# Patient Record
Sex: FEMALE | Race: WHITE | Employment: OTHER | URBAN - METROPOLITAN AREA
[De-identification: names, ages, dates, MRNs, and addresses within clinical notes are randomized per-mention and may not be internally consistent; named-entity substitution may affect disease eponyms.]

---

## 2017-01-02 ENCOUNTER — TELEPHONE (OUTPATIENT)
Dept: OTHER | Facility: CLINIC | Age: 42
End: 2017-01-02

## 2017-01-02 NOTE — TELEPHONE ENCOUNTER
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  Returned pt call [phone]  Pt states she has a toothache, throbbing, x 1 week. Saw dentist, reportedly no cavity or abscess. No fever. Not associated with exertion, worse at end of day or when lying down. She has a  Hx of cap on that tooth. Seen in the past by Dr Zuniga. Wanted to check with us to see if this was her heart because her father had toothache as anginal equivalent. I reassured her that her symptoms sounded non-anginal. She was grateful for my taking her call, and will follow up with dental/ medicine as appropriate.        Delvis Velez  Cardiology Fellow  [phone]

## 2017-01-16 ENCOUNTER — OFFICE VISIT (OUTPATIENT)
Dept: FAMILY MEDICINE | Facility: CLINIC | Age: 42
End: 2017-01-16
Payer: COMMERCIAL

## 2017-01-16 VITALS
HEART RATE: 89 BPM | HEIGHT: 70 IN | RESPIRATION RATE: 16 BRPM | OXYGEN SATURATION: 99 % | BODY MASS INDEX: 24.69 KG/M2 | WEIGHT: 172.5 LBS | DIASTOLIC BLOOD PRESSURE: 72 MMHG | SYSTOLIC BLOOD PRESSURE: 94 MMHG | TEMPERATURE: 97.7 F

## 2017-01-16 DIAGNOSIS — D68.51 HOMOZYGOUS FACTOR V LEIDEN MUTATION (H): ICD-10-CM

## 2017-01-16 DIAGNOSIS — R45.89 ANXIETY ABOUT HEALTH: ICD-10-CM

## 2017-01-16 DIAGNOSIS — Z79.01 LONG-TERM (CURRENT) USE OF ANTICOAGULANTS: ICD-10-CM

## 2017-01-16 DIAGNOSIS — R51.9 NONINTRACTABLE EPISODIC HEADACHE, UNSPECIFIED HEADACHE TYPE: Primary | ICD-10-CM

## 2017-01-16 DIAGNOSIS — I26.99 OTHER PULMONARY EMBOLISM WITHOUT ACUTE COR PULMONALE, UNSPECIFIED CHRONICITY (H): ICD-10-CM

## 2017-01-16 PROCEDURE — 99214 OFFICE O/P EST MOD 30 MIN: CPT | Performed by: NURSE PRACTITIONER

## 2017-01-16 NOTE — PROGRESS NOTES
SUBJECTIVE:                                                    Zayra Altamirano is a 41 year old female who presents to clinic today for the following health issues:    Headache      Duration: x 2 weeks    Description (location/character/radiation): above right ear     Intensity:  Mild to moderate     Accompanying signs and symptoms: muscle soreness on right side of shoulder    History (similar episodes/previous evaluation): None    Therapies tried and outcome: tylenol - help a little .      Had a crown displaced in  Right lower jaw, pushed into a a nerve of the tooth.  Was adjusted by dentist and headache has since resolved.  Finding her anxiety and worry related to health issues has magnified in the last 6 months.  Thinking she was having a stroke.  Worrying a lot.           Problem list and histories reviewed & adjusted, as indicated.  Additional history: as documented    Patient Active Problem List   Diagnosis     Embolism and thrombosis (H)     Pulmonary embolism and infarction (H)     MARNI DEF CLOT FACTOR NEC, Factor V Leiden homozygous- on lovenox 80mg bid     History of gestational diabetes     CARDIOVASCULAR SCREENING; LDL GOAL LESS THAN 160     Long-term (current) use of anticoagulants [Z79.01]     Ankle pain, left     Ankle pain, right     Homozygous Factor V Leiden mutation (H)     Other pulmonary embolism without acute cor pulmonale, unspecified chronicity (H)     Anxiety about health     Past Surgical History   Procedure Laterality Date     Hc colposcopy vulva w biopsy       C event monitor (cardiac - fl)  10/08     Normal/had palpatiations.      C/section, low transverse  2008     , Low Transverse      section  2012     Procedure: SECTION; Surgeon:ELY KRAMER; Location:[facility]       Social History   Substance Use Topics     Smoking status: Never Smoker      Smokeless tobacco: Never Used     Alcohol Use: 0.6 oz/week     1 Standard  "drinks or equivalent per week     Family History   Problem Relation Age of Onset     Blood Disease Brother      FACTOR 5 LEIDEN HOMOZYGOUS     Lipids Father      Hypertension Father      Circulatory Father      veins stripped      Unknown/Adopted Father      hyperlipidemia      Blood Disease Sister      FACTOR 5 LEIDEN      Blood Disease Sister      FACTOR 5     Breast Cancer Maternal Grandmother      DIABETES Maternal Grandmother      Type2     CEREBROVASCULAR DISEASE Maternal Grandmother      Cardiovascular Maternal Grandmother      MI     CANCER Maternal Grandfather      liver cancer      Breast Cancer Mother 63     diagnosed in May invasive ductal carcinoma         Current Outpatient Prescriptions   Medication Sig Dispense Refill     Acetaminophen (TYLENOL PO) Take 500 mg by mouth       warfarin (COUMADIN) 7.5 MG tablet TAKE ONE TABLET DAILY OR AS DIRECTED BY INR CLINIC NURSE. ( Takes 7.5 mg TTSS.  )  Takes 10 mg MWF (adds 2.5 mg tablet to the 7.5 mg tablet) 90 tablet 3     warfarin (COUMADIN) 2.5 MG tablet TAKE ONE TABLET ON MWF added to the 7.5 mg tablet to equal 10 mg on these days. Or AS DIRECTED BY COUMADIN CLINIC NURSE. 40 tablet 3     Allergies   Allergen Reactions     Benadryl Allergy Other (See Comments)     Had paradoxical reaction - made her very hyper       ROS:  Constitutional, HEENT, cardiovascular, pulmonary, gi and gu systems are negative, except as otherwise noted.    OBJECTIVE:                                                    BP 94/72 mmHg  Pulse 89  Temp(Src) 97.7  F (36.5  C) (Oral)  Resp 16  Ht 5' 9.75\" (1.772 m)  Wt 172 lb 8 oz (78.245 kg)  BMI 24.92 kg/m2  SpO2 99%  LMP 01/10/2017 (Exact Date)  Breastfeeding? No  Body mass index is 24.92 kg/(m^2).  GENERAL: healthy, alert and no distress  EYES: Eyes grossly normal to inspection, PERRL and conjunctivae and sclerae normal  HENT: ear canals and TM's normal, nose and mouth without ulcers or lesions  NECK: no adenopathy, no " asymmetry, masses, or scars and thyroid normal to palpation  RESP: lungs clear to auscultation - no rales, rhonchi or wheezes  CV: regular rate and rhythm, normal S1 S2, no S3 or S4, no murmur, click or rub, no peripheral edema and peripheral pulses strong  MS: no gross musculoskeletal defects noted, no edema  SKIN: no suspicious lesions or rashes    Diagnostic Test Results:  none      ASSESSMENT/PLAN:                                                        ICD-10-CM    1. Nonintractable episodic headache, unspecified headache type R51    2. Homozygous Factor V Leiden mutation (H) D68.52    3. Other pulmonary embolism without acute cor pulmonale, unspecified chronicity (H) I26.99    4. Long-term (current) use of anticoagulants [Z79.01] Z79.01    5. Anxiety about health F41.8 MENTAL HEALTH REFERRAL   headache resolved.      Ongoing worry about minor health concerns.      See Patient Instructions    SWAPNA Santana CNP  [facility]

## 2017-01-16 NOTE — NURSING NOTE
"Chief Complaint   Patient presents with     Headache       Initial BP 94/72 mmHg  Pulse 89  Temp(Src) 97.7  F (36.5  C) (Oral)  Resp 16  Ht 5' 9.75\" (1.772 m)  Wt 172 lb 8 oz (78.245 kg)  BMI 24.92 kg/m2  SpO2 99%  LMP 01/10/2017 (Exact Date)  Breastfeeding? No Estimated body mass index is 24.92 kg/(m^2) as calculated from the following:    Height as of this encounter: 5' 9.75\" (1.772 m).    Weight as of this encounter: 172 lb 8 oz (78.245 kg).  BP completed using cuff size: regular  Mitzy Dooley MA      "

## 2017-04-16 DIAGNOSIS — I74.9 EMBOLISM AND THROMBOSIS (H): ICD-10-CM

## 2017-04-16 DIAGNOSIS — I26.99 PULMONARY EMBOLISM AND INFARCTION (H): ICD-10-CM

## 2017-04-17 RX ORDER — WARFARIN SODIUM 2.5 MG/1
TABLET ORAL
Qty: 40 TABLET | Refills: 3 | Status: SHIPPED | OUTPATIENT
Start: 2017-04-17 | End: 2018-04-02

## 2017-04-19 ENCOUNTER — OFFICE VISIT (OUTPATIENT)
Dept: FAMILY MEDICINE | Facility: CLINIC | Age: 42
End: 2017-04-19
Payer: COMMERCIAL

## 2017-04-19 VITALS
RESPIRATION RATE: 14 BRPM | WEIGHT: 175 LBS | HEART RATE: 83 BPM | DIASTOLIC BLOOD PRESSURE: 69 MMHG | TEMPERATURE: 97.7 F | BODY MASS INDEX: 25.05 KG/M2 | HEIGHT: 70 IN | OXYGEN SATURATION: 98 % | SYSTOLIC BLOOD PRESSURE: 129 MMHG

## 2017-04-19 DIAGNOSIS — Z00.00 ROUTINE GENERAL MEDICAL EXAMINATION AT A HEALTH CARE FACILITY: Primary | ICD-10-CM

## 2017-04-19 DIAGNOSIS — Z01.419 ENCOUNTER FOR GYNECOLOGICAL EXAMINATION WITHOUT ABNORMAL FINDING: ICD-10-CM

## 2017-04-19 DIAGNOSIS — D68.51 HOMOZYGOUS FACTOR V LEIDEN MUTATION (H): ICD-10-CM

## 2017-04-19 PROCEDURE — G0145 SCR C/V CYTO,THINLAYER,RESCR: HCPCS | Performed by: NURSE PRACTITIONER

## 2017-04-19 PROCEDURE — 87624 HPV HI-RISK TYP POOLED RSLT: CPT | Performed by: NURSE PRACTITIONER

## 2017-04-19 PROCEDURE — 99396 PREV VISIT EST AGE 40-64: CPT | Performed by: NURSE PRACTITIONER

## 2017-04-19 NOTE — MR AVS SNAPSHOT
After Visit Summary   4/19/2017    Zayra Altamirano    MRN: [id number]           Patient Information     Date Of Birth          1975        Visit Information        Provider Department      4/19/2017 10:20 AM Jaquelin Duarte APRN [facility]        Today's Diagnoses     Routine general medical examination at a health care facility    -  1    Encounter for gynecological examination without abnormal finding        Homozygous Factor V Leiden mutation (H)          Care Instructions      Preventive Health Recommendations  Female Ages 40 to 49    Yearly exam:     See your health care provider every year in order to  1. Review health changes.   2. Discuss preventive care.    3. Review your medicines if your doctor prescribed any.      Get a Pap test every three years (unless you have an abnormal result and your provider advises testing more often).      If you get Pap tests with HPV test, you only need to test every 5 years, unless you have an abnormal result. You do not need a Pap test if your uterus was removed (hysterectomy) and you have not had cancer.      You should be tested each year for STDs (sexually transmitted diseases), if you're at risk.       Ask your doctor if you should have a mammogram.      Have a colonoscopy (test for colon cancer) if someone in your family has had colon cancer or polyps before age 50.       Have a cholesterol test every 5 years.       Have a diabetes test (fasting glucose) after age 45. If you are at risk for diabetes, you should have this test every 3 years.    Shots: Get a flu shot each year. Get a tetanus shot every 10 years.     Nutrition:     Eat at least 5 servings of fruits and vegetables each day.    Eat whole-grain bread, whole-wheat pasta and brown rice instead of white grains and rice.    Talk to your provider about Calcium and Vitamin D.     Lifestyle    Exercise at least 150 minutes a week (an average of 30 minutes  a day, 5 days a week). This will help you control your weight and prevent disease.    Limit alcohol to one drink per day.    No smoking.     Wear sunscreen to prevent skin cancer.    See your dentist every six months for an exam and cleaning.        Follow-ups after your visit        Additional Services     INR CLINIC REFERRAL       Your provider has referred you to INR Services.    Please be aware that coverage of these services is subject to the terms and limitations of your health insurance plan.  Call member services at your health plan with any benefit or coverage questions.    Indication for Anticoagulation: DVT (first time)  If nonstandard INR is desired, indicate goal range and explanation: 2-3  Expected Duration of Therapy: Lifetime                  Who to contact     If you have questions or need follow up information about today's clinic visit or your schedule please contact [facility] directly at [phone].  Normal or non-critical lab and imaging results will be communicated to you by [employer], letter or phone within 4 business days after the clinic has received the results. If you do not hear from us within 7 days, please contact the clinic through [employer] or phone. If you have a critical or abnormal lab result, we will notify you by phone as soon as possible.  Submit refill requests through [employer] or call your pharmacy and they will forward the refill request to us. Please allow 3 business days for your refill to be completed.          Additional Information About Your Visit        [employer] Information     [employer] gives you secure access to your electronic health record. If you see a primary care provider, you can also send messages to your care team and make appointments. If you have questions, please call your primary care clinic.  If you do not have a primary care provider, please call [phone] and they will assist you.        Care EveryWhere ID     This is your Care  "EveryWhere ID. This could be used by other organizations to access your [facility] medical records  [id number]        Your Vitals Were     Pulse Temperature Respirations Height Last Period Pulse Oximetry    83 97.7  F (36.5  C) (Oral) 14 5' 9.5\" (1.765 m) 04/03/2017 98%    BMI (Body Mass Index)                   25.47 kg/m2            Blood Pressure from Last 3 Encounters:   04/19/17 129/69   01/16/17 94/72   10/10/16 110/70    Weight from Last 3 Encounters:   04/19/17 175 lb (79.4 kg)   01/16/17 172 lb 8 oz (78.2 kg)   10/10/16 167 lb 12.8 oz (76.1 kg)              We Performed the Following     HPV High Risk Types DNA Cervical     INR CLINIC REFERRAL     Pap imaged thin layer screen with HPV - recommended age 30 - 65        Primary Care Provider Office Phone # Fax #    Jaquelin GonsalezSWAPNA Zuluaga [facility] [phone]       [facility] [address] [place] [id number]        Thank you!     Thank you for choosing [facility]  for your care. Our goal is always to provide you with excellent care. Hearing back from our patients is one way we can continue to improve our services. Please take a few minutes to complete the written survey that you may receive in the mail after your visit with us. Thank you!             Your Updated Medication List - Protect others around you: Learn how to safely use, store and throw away your medicines at www.disposemymeds.org.          This list is accurate as of: 4/19/17  2:52 PM.  Always use your most recent med list.                   Brand Name Dispense Instructions for use    * JANTOVEN 7.5 MG tablet   Generic drug:  warfarin     90 tablet    FOR DIRECTIONS ON HOW TO   TAKE THIS MEDICINE, READ   THE ENCLOSED MEDICATION    INFORMATION FORM       * JANTOVEN 2.5 MG tablet   Generic drug:  warfarin     40 tablet    FOR INSTRUCTIONS ON THE    PROPER DOSING OF YOUR      MEDICATION REFER TO YOUR   PARTICIPANT COUNSELING FORM       TYLENOL PO     "  Take 500 mg by mouth       * Notice:  This list has 2 medication(s) that are the same as other medications prescribed for you. Read the directions carefully, and ask your doctor or other care provider to review them with you.

## 2017-04-19 NOTE — PROGRESS NOTES
SUBJECTIVE:     CC: Zayra Altamirano is an 41 year old woman who presents for preventive health visit.     Physical   Annual:     Getting at least 3 servings of Calcium per day::  Yes    Bi-annual eye exam::  Yes    Dental care twice a year::  Yes    Sleep apnea or symptoms of sleep apnea::  None    Diet::  Regular (no restrictions)    Frequency of exercise::  2-3 days/week    Duration of exercise::  45-60 minutes    Taking medications regularly::  Yes    Medication side effects::  None    Additional concerns today::  No      Today's PHQ-2 Score:   PHQ-2 ( 1999 Pfizer) 4/19/2017   Little interest or pleasure in doing things Not at all   Feeling down, depressed or hopeless Not at all   PHQ-2 Score 0       Abuse: Current or Past(Physical, Sexual or Emotional)- No  Do you feel safe in your environment - Yes    Social History   Substance Use Topics     Smoking status: Never Smoker     Smokeless tobacco: Never Used     Alcohol use 0.6 oz/week     1 Standard drinks or equivalent per week     The patient does not drink >3 drinks per day nor >7 drinks per week.    Recent Labs   Lab Test  10/10/16   1108  05/02/16   1106   05/20/13   0912  12/21/12   1032   CHOL  197  198   < >  188  227*   HDL  51  50   < >  52  56   LDL  126*  123*   < >  113  157*   TRIG  98  123   < >  114  72   CHOLHDLRATIO   --    --    --   3.6  4.1   NHDL  146*  148*   --    --    --     < > = values in this interval not displayed.       Reviewed orders with patient.  Reviewed health maintenance and updated orders accordingly - Yes    Mammo Decision Support:    Pertinent mammograms are reviewed under the imaging tab.  History of abnormal Pap smear: NO - age 30-65 PAP every 5 years with negative HPV co-testing recommended    Reviewed and updated as needed this visit by clinical staff  Tobacco  Allergies  Meds  Med Hx  Surg Hx  Fam Hx  Soc Hx        Reviewed and updated as needed this visit by Provider  Tobacco  Allergies  Meds   "Med Hx  Surg Hx  Fam Hx  Soc Hx         Sexually active, male  partners,  Would not like std screening.  Is using male surg for contraception.      Works at [employer].     Does exercise regularly.  Does  eat a well balanced diet including plenty of fruits, vegetables, calcium and proteins.        On coumadin for life due to factor V leiden and DVT, PE.  Due for INR, following with [facility] nurse sporadically.      Has nabothian cyst on cervix and cyst on anterior vag wall, not changing.  Nontender.  Prefers vag exam yearly, even if pap not due.    ROS:  C: NEGATIVE for fever, chills, change in weight  I: NEGATIVE for worrisome rashes, moles or lesions  E: NEGATIVE for vision changes or irritation  ENT: NEGATIVE for ear, mouth and throat problems  R: NEGATIVE for significant cough or SOB  B: NEGATIVE for masses, tenderness or discharge  CV: NEGATIVE for chest pain, palpitations or peripheral edema  GI: NEGATIVE for nausea, abdominal pain, heartburn, or change in bowel habits  : NEGATIVE for unusual urinary or vaginal symptoms. Periods are regular.  M: NEGATIVE for significant arthralgias or myalgia  N: NEGATIVE for weakness, dizziness or paresthesias  P: NEGATIVE for changes in mood or affect    Problem list, Medication list, Allergies, and Medical/Social/Surgical histories reviewed in [facility] and updated as appropriate.  Labs reviewed in EPIC  BP Readings from Last 3 Encounters:   04/19/17 129/69   01/16/17 94/72   10/10/16 110/70    Wt Readings from Last 3 Encounters:   04/19/17 175 lb (79.4 kg)   01/16/17 172 lb 8 oz (78.2 kg)   10/10/16 167 lb 12.8 oz (76.1 kg)                  OBJECTIVE:     /69 (BP Location: Left arm)  Pulse 83  Temp 97.7  F (36.5  C) (Oral)  Resp 14  Ht 5' 9.5\" (1.765 m)  Wt 175 lb (79.4 kg)  LMP 04/03/2017  SpO2 98%  BMI 25.47 kg/m2  EXAM:  GENERAL: healthy, alert and no distress  EYES: Eyes grossly normal to inspection, PERRL and conjunctivae and sclerae normal  HENT: ear canals and " "TM's normal, nose and mouth without ulcers or lesions  NECK: no adenopathy, no asymmetry, masses, or scars and thyroid normal to palpation  RESP: lungs clear to auscultation - no rales, rhonchi or wheezes  BREAST: normal without masses, tenderness or nipple discharge and no palpable axillary masses or adenopathy  CV: regular rate and rhythm, normal S1 S2, no S3 or S4, no murmur, click or rub, no peripheral edema and peripheral pulses strong  ABDOMEN: soft, nontender, no hepatosplenomegaly, no masses and bowel sounds normal   (female): cyst x 2 on cervix, 12 oclock and 6 oclock. 1 cm  Cyst on anterior wall of vagina just inside introitis.  normal female external genitalia, normal urethral meatus, vaginal mucosa pink, moist, well rugated, and normal cervix/adnexa/uterus without discharge  MS: no gross musculoskeletal defects noted, no edema  SKIN: no suspicious lesions or rashes  NEURO: Normal strength and tone, mentation intact and speech normal  PSYCH: mentation appears normal, affect normal/bright    ASSESSMENT/PLAN:         ICD-10-CM    1. Routine general medical examination at a health care facility Z00.00 Pap imaged thin layer screen with HPV - recommended age 30 - 65     HPV High Risk Types DNA Cervical   2. Encounter for gynecological examination without abnormal finding Z01.419 Pap imaged thin layer screen with HPV - recommended age 30 - 65     HPV High Risk Types DNA Cervical   3. Homozygous Factor V Leiden mutation (H) D68.52 INR CLINIC REFERRAL      well female, not fasting for labs.  Encouraged to continue exercise and healthy diet choices.          COUNSELING:  Reviewed preventive health counseling, as reflected in patient instructions         reports that she has never smoked. She has never used smokeless tobacco.    Estimated body mass index is 25.47 kg/(m^2) as calculated from the following:    Height as of this encounter: 5' 9.5\" (1.765 m).    Weight as of this encounter: 175 lb (79.4 kg). "       Counseling Resources:  ATP IV Guidelines  Pooled Cohorts Equation Calculator  Breast Cancer Risk Calculator  FRAX Risk Assessment  ICSI Preventive Guidelines  Dietary Guidelines for Americans, 2010  USDA's MyPlate  ASA Prophylaxis  Lung CA Screening    SWAPNA Santana CNP  [facility]  Answers for HPI/ROS submitted by the patient on 4/19/2017   Q1: Little interest or pleasure in doing things: 0=Not at all  Q2: Feeling down, depressed or hopeless: 0=Not at all  PHQ-2 Score: 0

## 2017-04-19 NOTE — NURSING NOTE
"Chief Complaint   Patient presents with     Physical       Initial /69 (BP Location: Left arm)  Pulse 83  Temp 97.7  F (36.5  C) (Oral)  Resp 14  Ht 5' 9.5\" (1.765 m)  Wt 175 lb (79.4 kg)  LMP 04/03/2017  SpO2 98%  BMI 25.47 kg/m2 Estimated body mass index is 25.47 kg/(m^2) as calculated from the following:    Height as of this encounter: 5' 9.5\" (1.765 m).    Weight as of this encounter: 175 lb (79.4 kg).  Medication Reconciliation: complete     Deann Ibarra CMA      "

## 2017-04-21 LAB
COPATH REPORT: NORMAL
PAP: NORMAL

## 2017-04-24 LAB
FINAL DIAGNOSIS: NORMAL
HPV HR 12 DNA CVX QL NAA+PROBE: NEGATIVE
HPV16 DNA SPEC QL NAA+PROBE: NEGATIVE
HPV18 DNA SPEC QL NAA+PROBE: NEGATIVE
SPECIMEN DESCRIPTION: NORMAL

## 2017-04-27 ENCOUNTER — TELEPHONE (OUTPATIENT)
Dept: FAMILY MEDICINE | Facility: CLINIC | Age: 42
End: 2017-04-27

## 2017-04-27 DIAGNOSIS — H57.89 IRRITATION OF LEFT EYE: Primary | ICD-10-CM

## 2017-04-27 NOTE — TELEPHONE ENCOUNTER
"Zayra Altamirano is a 41 year old female who calls with left eye problem/bump.    NURSING ASSESSMENT:  Description:  Patient states she has had previously had symptoms which resolved. New symptoms started 24 hours ago a bump in the 'pupil' of her left eye that has been causing irritation - she is requesting nurse advice regarding appointments  Onset/duration:  24 hours  Precip. factors:  Happened previously  Associated symptoms:    1. Left eye bump - located on pupil \"pin sized\" - with some redness - itchy/discomfort  2. Denies severe pain/pressure, bleeding, blurry vision, swelling, drainage, fever  3. Does not wear contact - has reading glasses for driving    Improves/worsens symptoms:  n/a  Pain scale (0-10)   0/10  LMP/preg/breast feeding:  n/a  Last exam/Treatment:  4/19/2017  Allergies:   Allergies   Allergen Reactions     Benadryl Allergy Other (See Comments)     Had paradoxical reaction - made her very hyper       MEDICATIONS:   Taking medication(s) as prescribed? N/A  Taking over the counter medication(s?) Yes  Any medication side effects? No significant side effects    Any barriers to taking medication(s) as prescribed?  No  Medication(s) improving/managing symptoms?  No  Medication reconciliation completed: No      NURSING PLAN: Nursing advice to patient discussed OK for eye doctor referral - reviewed can use cool compress for itching, try not to rub - wash out with water or natural tears - to be evaluated sooner/ED for severe pain/pressure, blood in pupil, blurry vision - call back if unable to get in soon at this location  or if you need an office visit - patient verbalized understanding - and agrees with plan    RECOMMENDED DISPOSITION:  See in 24 hours - see above  Will comply with recommendation: Yes  If further questions/concerns or if symptoms do not improve, worsen or new symptoms develop, call your PCP or [facility] as soon as possible.      Guideline used:  Telephone " Triage Protocols for Nurses, Fifth Edition, Hannah Maurice, RN

## 2017-06-26 ENCOUNTER — HOSPITAL ENCOUNTER (OUTPATIENT)
Dept: MAMMOGRAPHY | Facility: CLINIC | Age: 42
Discharge: HOME | End: 2017-06-26
Admitting: NURSE PRACTITIONER
Payer: COMMERCIAL

## 2017-06-26 DIAGNOSIS — Z00.00 PREVENTATIVE HEALTH CARE: ICD-10-CM

## 2017-06-26 PROCEDURE — 77063 BREAST TOMOSYNTHESIS BI: CPT

## 2017-06-26 PROCEDURE — G0202 SCR MAMMO BI INCL CAD: HCPCS

## 2017-09-16 ENCOUNTER — NURSE TRIAGE (OUTPATIENT)
Dept: NURSING | Facility: CLINIC | Age: 42
End: 2017-09-16

## 2017-09-17 NOTE — TELEPHONE ENCOUNTER
"  Additional Information    Negative: Shock suspected (e.g., cold/pale/clammy skin, too weak to stand, low BP, rapid pulse)    Negative: Difficult to awaken or acting confused  (e.g., disoriented, slurred speech)    Negative: Passed out (i.e., lost consciousness, collapsed and was not responding)    Negative: Sounds like a life-threatening emergency to the triager    Negative: [1] SEVERE pain (e.g., excruciating) AND [2] present > 1 hour    Negative: [1] SEVERE pain AND [2] age > 60    Negative: [1] Vomiting AND [2] contains red blood or black (\"coffee ground\") material  (Exception: few red streaks in vomit that only happened once)    Negative: Blood in bowel movements   (Exception: blood on surface of BM with constipation)    Negative: Black or tarry bowel movements  (Exception: chronic-unchanged  black-grey bowel movements AND is taking iron pills or Pepto-bismol)    Negative: Patient sounds very sick or weak to the triager    Negative: [1] MILD-MODERATE pain AND [2] constant AND [3] present > 2 hours    Negative: [1] Vomiting AND [2] abdomen looks much more swollen than usual    Negative: [1] Vomiting AND [2] contains bile (green color)    Negative: White of the eyes have turned yellow (i.e., jaundice)    Negative: Fever > 103 F (39.4 C)    Negative: [1] Fever > 101 F (38.3 C) AND [2] age > 60    Negative: [1] Fever > 101 F (38.3 C) AND [2] bedridden (e.g., nursing home patient, CVA, chronic illness, recovering from surgery)    Negative: [1] Fever > 100.5 F (38.1 C) AND [2] diabetes mellitus or weak immune system (e.g., HIV positive, cancer chemo, splenectomy, organ transplant, chronic steroids)    Negative: [1] SEVERE abdominal pain AND [2] present < 1 hour  (all triage questions negative)    Negative: [1] MODERATE (e.g., interferes with normal activities) AND [2] pain comes and goes (cramps) AND [3] present > 24 hours  (Exception: pain with Vomiting or Diarrhea - see that Guideline)    Negative: [1] MILD (e.g., " does not interfere with normal activities) AND [2] pain comes and goes (cramps) AND [3] present > 48 hours    Negative: Age > 60 years    Negative: Pregnancy suspected (e.g., missed last menstrual period)    Negative: Unusual vaginal discharge (e.g., bad smelling, yellow, green, or foamy-white)    Negative: Blood in urine (red, pink, or tea-colored)    Negative: Abdominal pain is a chronic symptom (recurrent or ongoing AND present > 4 weeks)    Negative: Pain with sexual intercourse (dyspareunia)    [1] MILD-MODERATE abdominal pain AND [2] comes and goes (cramps)  (all triage questions negative)    Protocols used: ABDOMINAL PAIN - FEMALE-ADULT-

## 2017-09-27 ENCOUNTER — OFFICE VISIT (OUTPATIENT)
Dept: FAMILY MEDICINE | Facility: CLINIC | Age: 42
End: 2017-09-27
Payer: COMMERCIAL

## 2017-09-27 ENCOUNTER — RADIANT APPOINTMENT (OUTPATIENT)
Dept: GENERAL RADIOLOGY | Facility: CLINIC | Age: 42
End: 2017-09-27
Attending: NURSE PRACTITIONER
Payer: COMMERCIAL

## 2017-09-27 VITALS
WEIGHT: 172.5 LBS | BODY MASS INDEX: 25.11 KG/M2 | SYSTOLIC BLOOD PRESSURE: 113 MMHG | DIASTOLIC BLOOD PRESSURE: 69 MMHG | OXYGEN SATURATION: 98 % | TEMPERATURE: 98.1 F | HEART RATE: 83 BPM

## 2017-09-27 DIAGNOSIS — I26.99 OTHER PULMONARY EMBOLISM WITHOUT ACUTE COR PULMONALE, UNSPECIFIED CHRONICITY (H): ICD-10-CM

## 2017-09-27 DIAGNOSIS — R10.84 ABDOMINAL PAIN, GENERALIZED: Primary | ICD-10-CM

## 2017-09-27 DIAGNOSIS — R10.84 ABDOMINAL PAIN, GENERALIZED: ICD-10-CM

## 2017-09-27 DIAGNOSIS — D68.51 HOMOZYGOUS FACTOR V LEIDEN MUTATION (H): ICD-10-CM

## 2017-09-27 DIAGNOSIS — Z23 NEED FOR PROPHYLACTIC VACCINATION AND INOCULATION AGAINST INFLUENZA: ICD-10-CM

## 2017-09-27 LAB — INR PPP: 2.8 (ref 0.86–1.14)

## 2017-09-27 PROCEDURE — 36416 COLLJ CAPILLARY BLOOD SPEC: CPT | Performed by: NURSE PRACTITIONER

## 2017-09-27 PROCEDURE — 99214 OFFICE O/P EST MOD 30 MIN: CPT | Mod: 25 | Performed by: NURSE PRACTITIONER

## 2017-09-27 PROCEDURE — 85610 PROTHROMBIN TIME: CPT | Performed by: NURSE PRACTITIONER

## 2017-09-27 PROCEDURE — 90686 IIV4 VACC NO PRSV 0.5 ML IM: CPT | Performed by: NURSE PRACTITIONER

## 2017-09-27 PROCEDURE — 90471 IMMUNIZATION ADMIN: CPT | Performed by: NURSE PRACTITIONER

## 2017-09-27 PROCEDURE — 74010 XR KUB: CPT | Mod: 52

## 2017-09-27 NOTE — PROGRESS NOTES
SUBJECTIVE:   Zayra Altamirano is a 42 year old female who presents to clinic today for the following health issues:    Musculoskeletal problem/pain      Duration: x1 week off and on.     Description  Location: pressure/ache above left hip bone     Accompanying signs and symptoms: nausea and abdominal pain     History  Previous similar problem: no   Previous evaluation:  none    Precipitating or alleviating factors:  Trauma or overuse: no     Therapies tried and outcome: changing up diet to see if it helps - did not seem help     Somewhat constipated,  Feels bloated.  Stools are slower and harder to push out.  Eats a lot of apple, banana and rice.  No fevers.  Pain is dull and crampy.  Not sharp or stabbing.  No weight loss or vomiting.      Reviewed and updated as needed this visit by clinical staffTobacco  Allergies  Meds  Problems  Med Hx  Surg Hx  Fam Hx  Soc Hx        Reviewed and updated as needed this visit by Provider  Tobacco  Allergies  Meds  Problems  Med Hx  Surg Hx  Fam Hx  Soc Hx          ROS:  Constitutional, HEENT, cardiovascular, pulmonary, gi and gu systems are negative, except as otherwise noted.      OBJECTIVE:   /69 (BP Location: Left arm, Patient Position: Sitting, Cuff Size: Adult Regular)  Pulse 83  Temp 98.1  F (36.7  C) (Oral)  Wt 172 lb 8 oz (78.2 kg)  LMP  (LMP Unknown)  SpO2 98%  Breastfeeding? No  BMI 25.11 kg/m2  Body mass index is 25.11 kg/(m^2).  GENERAL: healthy, alert and no distress  RESP: lungs clear to auscultation - no rales, rhonchi or wheezes  CV: regular rate and rhythm, normal S1 S2, no S3 or S4, no murmur, click or rub, no peripheral edema and peripheral pulses strong  ABDOMEN: tenderness LUQ and LLQ, liver span normal to percussion, no palpable or pulsatile masses and umbilicus normal  MS: no gross musculoskeletal defects noted, no edema  SKIN: no suspicious lesions or rashes  PSYCH: mentation appears normal, affect  "normal/bright    Diagnostic Test Results:  Results for orders placed or performed in visit on 09/27/17   INR   Result Value Ref Range    INR 2.80 (H) 0.86 - 1.14     Xray interpreted as negative in the clinic.  Pending radiologist review.      ASSESSMENT/PLAN:         ICD-10-CM    1. Abdominal pain, generalized R10.84 XR KUB   2. Other pulmonary embolism without acute cor pulmonale, unspecified chronicity (H) I26.99 INR   3. Homozygous Factor V Leiden mutation (H) D68.51 INR   4. Need for prophylactic vaccination and inoculation against influenza Z23 FLU VAC, SPLIT VIRUS IM > 3 YO (QUADRIVALENT) [77530]     Vaccine Administration, Initial [53484]     Xray interpreted as negative in the clinic.  Pending radiologist review.    Moderate stool and air in colon.  Non-constipating diet discussed.  Increase fiber and fluid and decrease milk and cheese intake.   Warm baths, belly massage, motion of bicycling legs.  Eat \"P\" fruits - pear, prune, pineapple, papaya, peach can help soften stool.  Drink plenty of fluids.   Call with any questions or concerns.      INR at goal.  Continue jantoven as planned.      See Patient Instructions    SWAPNA Santana [facility]  Injectable Influenza Immunization Documentation    1.  Is the person to be vaccinated sick today?   No    2. Does the person to be vaccinated have an allergy to a component   of the vaccine?   No    3. Has the person to be vaccinated ever had a serious reaction   to influenza vaccine in the past?   No    4. Has the person to be vaccinated ever had Guillain-Barré syndrome?   No    Form completed by Mitzy oDoley MA             "

## 2017-09-27 NOTE — MR AVS SNAPSHOT
After Visit Summary   9/27/2017    Zayra Altamirano    MRN: [id number]           Patient Information     Date Of Birth          1975        Visit Information        Provider Department      9/27/2017 1:00 PM Jaquelin Duarte APRN CNP [facility]        Today's Diagnoses     Abdominal pain, generalized    -  1    Other pulmonary embolism without acute cor pulmonale, unspecified chronicity (H)        Homozygous Factor V Leiden mutation (H)        Need for prophylactic vaccination and inoculation against influenza           Follow-ups after your visit        Who to contact     If you have questions or need follow up information about today's clinic visit or your schedule please contact [facility] directly at [phone].  Normal or non-critical lab and imaging results will be communicated to you by Rayspanhart, letter or phone within 4 business days after the clinic has received the results. If you do not hear from us within 7 days, please contact the clinic through Rayspanhart or phone. If you have a critical or abnormal lab result, we will notify you by phone as soon as possible.  Submit refill requests through StellaService or call your pharmacy and they will forward the refill request to us. Please allow 3 business days for your refill to be completed.          Additional Information About Your Visit        MyChart Information     StellaService gives you secure access to your electronic health record. If you see a primary care provider, you can also send messages to your care team and make appointments. If you have questions, please call your primary care clinic.  If you do not have a primary care provider, please call [phone] and they will assist you.        Care EveryWhere ID     This is your Care EveryWhere ID. This could be used by other organizations to access your [place] medical records  [id number]        Your Vitals Were     Pulse Temperature  Last Period Pulse Oximetry Breastfeeding? BMI (Body Mass Index)    83 98.1  F (36.7  C) (Oral) (LMP Unknown) 98% No 25.11 kg/m2       Blood Pressure from Last 3 Encounters:   09/27/17 113/69   04/19/17 129/69   01/16/17 94/72    Weight from Last 3 Encounters:   09/27/17 172 lb 8 oz (78.2 kg)   04/19/17 175 lb (79.4 kg)   01/16/17 172 lb 8 oz (78.2 kg)              We Performed the Following     FLU VAC, SPLIT VIRUS IM > 3 YO (QUADRIVALENT) [65346]     INR     Vaccine Administration, Initial [[id number]]        Primary Care Provider Office Phone # Fax #    Jaquelin Handy SWAPNA Duarte [facility] [phone]  [phone]        Equal Access to Services     FAUSTO CAR : Hadii aad ku hadasho Soamador, waaxda luqadaha, qaybta kaalmada adeegyada, alem domínguez . So [facility] [phone].    ATENCIÓN: Si habla español, tiene a bowie disposición servicios gratuitos de asistencia lingüística. Llame al [phone].    We comply with applicable federal civil rights laws and [place] laws. We do not discriminate on the basis of race, color, national origin, age, disability sex, sexual orientation or gender identity.            Thank you!     Thank you for choosing [facility]  for your care. Our goal is always to provide you with excellent care. Hearing back from our patients is one way we can continue to improve our services. Please take a few minutes to complete the written survey that you may receive in the mail after your visit with us. Thank you!             Your Updated Medication List - Protect others around you: Learn how to safely use, store and throw away your medicines at www.disposemymeds.org.          This list is accurate as of: 9/27/17 11:59 PM.  Always use your most recent med list.                   Brand Name Dispense Instructions for use Diagnosis    * JANTOVEN 7.5 MG tablet   Generic drug:  warfarin     90 tablet    FOR DIRECTIONS ON  HOW TO   TAKE THIS MEDICINE, READ   THE ENCLOSED MEDICATION    INFORMATION FORM    Embolism and thrombosis (H), Pulmonary embolism and infarction (H)       * JANTOVEN 2.5 MG tablet   Generic drug:  warfarin     40 tablet    FOR INSTRUCTIONS ON THE    PROPER DOSING OF YOUR      MEDICATION REFER TO YOUR   PARTICIPANT COUNSELING FORM    Embolism and thrombosis (H), Pulmonary embolism and infarction (H)       TYLENOL PO      Take 500 mg by mouth        * Notice:  This list has 2 medication(s) that are the same as other medications prescribed for you. Read the directions carefully, and ask your doctor or other care provider to review them with you.

## 2017-09-27 NOTE — NURSING NOTE
"Chief Complaint   Patient presents with     Musculoskeletal Problem       Initial /69 (BP Location: Left arm, Patient Position: Sitting, Cuff Size: Adult Regular)  Pulse 83  Temp 98.1  F (36.7  C) (Oral)  Wt 172 lb 8 oz (78.2 kg)  LMP  (LMP Unknown)  SpO2 98%  Breastfeeding? No  BMI 25.11 kg/m2 Estimated body mass index is 25.11 kg/(m^2) as calculated from the following:    Height as of 4/19/17: 5' 9.5\" (1.765 m).    Weight as of this encounter: 172 lb 8 oz (78.2 kg).  Medication Reconciliation: complete     Mitzy Dooley MA      "

## 2017-11-01 ENCOUNTER — MYC MEDICAL ADVICE (OUTPATIENT)
Dept: FAMILY MEDICINE | Facility: CLINIC | Age: 42
End: 2017-11-01

## 2018-01-31 ENCOUNTER — OFFICE VISIT (OUTPATIENT)
Dept: FAMILY MEDICINE | Facility: CLINIC | Age: 43
End: 2018-01-31
Payer: COMMERCIAL

## 2018-01-31 VITALS
TEMPERATURE: 97.4 F | WEIGHT: 177 LBS | DIASTOLIC BLOOD PRESSURE: 67 MMHG | BODY MASS INDEX: 25.76 KG/M2 | OXYGEN SATURATION: 100 % | RESPIRATION RATE: 16 BRPM | HEART RATE: 86 BPM | SYSTOLIC BLOOD PRESSURE: 122 MMHG

## 2018-01-31 DIAGNOSIS — I74.9 EMBOLISM AND THROMBOSIS (H): Primary | ICD-10-CM

## 2018-01-31 DIAGNOSIS — N62 HYPERTROPHY OF BREAST: ICD-10-CM

## 2018-01-31 DIAGNOSIS — G44.209 TENSION HEADACHE: ICD-10-CM

## 2018-01-31 DIAGNOSIS — D68.51 FACTOR V LEIDEN (H): ICD-10-CM

## 2018-01-31 LAB — INR PPP: 3 (ref 0.86–1.14)

## 2018-01-31 PROCEDURE — 36416 COLLJ CAPILLARY BLOOD SPEC: CPT | Performed by: NURSE PRACTITIONER

## 2018-01-31 PROCEDURE — 85610 PROTHROMBIN TIME: CPT | Performed by: NURSE PRACTITIONER

## 2018-01-31 PROCEDURE — 99214 OFFICE O/P EST MOD 30 MIN: CPT | Performed by: NURSE PRACTITIONER

## 2018-01-31 RX ORDER — WARFARIN SODIUM 7.5 MG/1
7.5 TABLET ORAL DAILY
Qty: 90 TABLET | Refills: 1 | Status: SHIPPED | OUTPATIENT
Start: 2018-01-31 | End: 2018-06-17

## 2018-01-31 NOTE — MR AVS SNAPSHOT
After Visit Summary   1/31/2018    Zayra Atlamirano    MRN: [id number]           Patient Information     Date Of Birth          1975        Visit Information        Provider Department      1/31/2018 12:40 PM Jaquelin Duarte APRN CNP [facility]        Today's Diagnoses     Embolism and thrombosis (H)    -  1    Factor V Leiden (H)        Tension headache        Hypertrophy of breast           Follow-ups after your visit        Who to contact     If you have questions or need follow up information about today's clinic visit or your schedule please contact [facility] directly at [phone].  Normal or non-critical lab and imaging results will be communicated to you by SkyVu Entertainmenthart, letter or phone within 4 business days after the clinic has received the results. If you do not hear from us within 7 days, please contact the clinic through SkyVu Entertainmenthart or phone. If you have a critical or abnormal lab result, we will notify you by phone as soon as possible.  Submit refill requests through Shuttlerock or call your pharmacy and they will forward the refill request to us. Please allow 3 business days for your refill to be completed.          Additional Information About Your Visit        MyChart Information     Shuttlerock gives you secure access to your electronic health record. If you see a primary care provider, you can also send messages to your care team and make appointments. If you have questions, please call your primary care clinic.  If you do not have a primary care provider, please call [phone] and they will assist you.        Care EveryWhere ID     This is your Care EveryWhere ID. This could be used by other organizations to access your [place] medical records  [id number]        Your Vitals Were     Pulse Temperature Respirations Pulse Oximetry BMI (Body Mass Index)       86 97.4  F (36.3  C) (Tympanic) 16 100% 25.76 kg/m2        Blood Pressure  from Last 3 Encounters:   01/31/18 122/67   09/27/17 113/69   04/19/17 129/69    Weight from Last 3 Encounters:   01/31/18 177 lb (80.3 kg)   09/27/17 172 lb 8 oz (78.2 kg)   04/19/17 175 lb (79.4 kg)              We Performed the Following     INR          Today's Medication Changes          These changes are accurate as of 1/31/18  8:43 PM.  If you have any questions, ask your nurse or doctor.               These medicines have changed or have updated prescriptions.        Dose/Directions    * JANTOVEN 7.5 MG tablet   This may have changed:  Another medication with the same name was added. Make sure you understand how and when to take each.   Used for:  Embolism and thrombosis (H), Pulmonary embolism and infarction (H)   Generic drug:  warfarin   Changed by:  Jaquelin Duarte APRN CNP        FOR DIRECTIONS ON HOW TO   TAKE THIS MEDICINE, READ   THE ENCLOSED MEDICATION    INFORMATION FORM   Quantity:  90 tablet   Refills:  3       * JANTOVEN 2.5 MG tablet   This may have changed:  Another medication with the same name was added. Make sure you understand how and when to take each.   Used for:  Embolism and thrombosis (H), Pulmonary embolism and infarction (H)   Generic drug:  warfarin   Changed by:  Jaquelin Duarte APRN CNP        FOR INSTRUCTIONS ON THE    PROPER DOSING OF YOUR      MEDICATION REFER TO YOUR   PARTICIPANT COUNSELING FORM   Quantity:  40 tablet   Refills:  3       * warfarin 7.5 MG tablet   Commonly known as:  COUMADIN   This may have changed:  You were already taking a medication with the same name, and this prescription was added. Make sure you understand how and when to take each.   Used for:  Embolism and thrombosis (H), Factor V Leiden (H)   Changed by:  Jaquelin Duarte APRN CNP        Dose:  7.5 mg   Take 1 tablet (7.5 mg) by mouth daily   Quantity:  90 tablet   Refills:  1       * Notice:  This list has 3 medication(s) that are the same as other medications prescribed for you.  Read the directions carefully, and ask your doctor or other care provider to review them with you.         Where to get your medicines      These [facility] [place], [place] - [address] AT Portal to [facility] Sites  [address], [place] [address]     Phone:  [phone]     warfarin 7.5 MG tablet                Primary Care Provider Office Phone # Fax #    Jaquelin Duarte, APRN [facility] [phone] [facility] [address]        Equal Access to Services     [facility] CenterFORTINO : Hadii aad ku hadasho Soomaali, waaxda luqadaha, qaybta kaalmada adeegyada, alem brown hayyesenia domínguez . So [facility] [phone].    ATENCIÓN: Si habla español, tiene a bowie disposición servicios gratuitos de asistencia lingüística. [facility] [phone].    We comply with applicable federal civil rights laws and [place] laws. We do not discriminate on the basis of race, color, national origin, age, disability, sex, sexual orientation, or gender identity.            Thank you!     Thank you for choosing [facility]  for your care. Our goal is always to provide you with excellent care. Hearing back from our patients is one way we can continue to improve our services. Please take a few minutes to complete the written survey that you may receive in the mail after your visit with us. Thank you!             Your Updated Medication List - Protect others around you: Learn how to safely use, store and throw away your medicines at www.disposemymeds.org.          This list is accurate as of 1/31/18  8:43 PM.  Always use your most recent med list.                   Brand Name Dispense Instructions for use Diagnosis    * JANTOVEN 7.5 MG tablet   Generic drug:  warfarin     90 tablet    FOR DIRECTIONS ON HOW TO   TAKE THIS MEDICINE, READ   THE ENCLOSED MEDICATION    INFORMATION FORM    Embolism and thrombosis (H), Pulmonary  embolism and infarction (H)       * JANTOVEN 2.5 MG tablet   Generic drug:  warfarin     40 tablet    FOR INSTRUCTIONS ON THE    PROPER DOSING OF YOUR      MEDICATION REFER TO YOUR   PARTICIPANT COUNSELING FORM    Embolism and thrombosis (H), Pulmonary embolism and infarction (H)       * warfarin 7.5 MG tablet    COUMADIN    90 tablet    Take 1 tablet (7.5 mg) by mouth daily    Embolism and thrombosis (H), Factor V Leiden (H)       TYLENOL PO      Take 500 mg by mouth        * Notice:  This list has 3 medication(s) that are the same as other medications prescribed for you. Read the directions carefully, and ask your doctor or other care provider to review them with you.

## 2018-01-31 NOTE — PROGRESS NOTES
SUBJECTIVE:   Zayra Altamirano is a 42 year old female who presents to clinic today for the following health issues:    Head Problem       Duration: x 3 months    Description (location/character/radiation): right side head pressure/tightness above ear, comes and goes.     Intensity:  moderate    Accompanying signs and symptoms: none    History (similar episodes/previous evaluation): None    Precipitating or alleviating factors: None    Therapies tried and outcome: None     Muscle tightness over scalp on right side from upper ear to temproral area.  Sometimes into the neck.    Started more vitamin d thinks it helps.    Wondering if it is in her ear.   Thinking about trying massage.   Thinks she can feel the blood vessels in her head at times.    Rating pain +1/10     Noticed breast thickening on right lateral breast.  May be cyclical with her periods.  Not tender.  Does monthly exams.     Due for clinic INR.  Normally manages on home meter and is usually at goal.  Denies any recent bleeding or bruising.  Diet is stable.      Reviewed and updated as needed this visit by clinical staff  Tobacco  Allergies  Meds  Med Hx  Surg Hx  Fam Hx  Soc Hx      Reviewed and updated as needed this visit by Provider  Tobacco  Allergies  Meds  Med Hx  Surg Hx  Fam Hx  Soc Hx        ROS:  Constitutional, HEENT, cardiovascular, pulmonary, GI, , musculoskeletal, neuro, skin, endocrine and psych systems are negative, except as otherwise noted.    OBJECTIVE:     /67 (BP Location: Left arm, Patient Position: Chair, Cuff Size: Adult Regular)  Pulse 86  Temp 97.4  F (36.3  C) (Tympanic)  Resp 16  Wt 177 lb (80.3 kg)  SpO2 100%  BMI 25.76 kg/m2  Body mass index is 25.76 kg/(m^2).  GENERAL: healthy, alert and no distress  EYES: Eyes grossly normal to inspection, PERRL and conjunctivae and sclerae normal  HENT: ear canals and TM's normal, nose and mouth without ulcers or lesions  NECK: no adenopathy, no  asymmetry, masses, or scars and thyroid normal to palpation  RESP: lungs clear to auscultation - no rales, rhonchi or wheezes  BREAST: right lateral breast at 9 oclock with soft linear thickening approx 1 cm x 2 cm.  Otherwise normal without masses, tenderness or nipple discharge and no palpable axillary masses or adenopathy  CV: regular rate and rhythm, normal S1 S2, no S3 or S4, no murmur, click or rub, no peripheral edema and peripheral pulses strong  MS: no gross musculoskeletal defects noted, no edema  SKIN: no suspicious lesions or rashes  NEURO: Normal strength and tone, sensory exam grossly normal, mentation intact and cranial nerves 2-12 intact  PSYCH: mentation appears normal, affect normal/bright    Results for orders placed or performed in visit on 01/31/18   INR   Result Value Ref Range    INR 3.00 (H) 0.86 - 1.14       ASSESSMENT/PLAN:       ICD-10-CM    1. Embolism and thrombosis (H) I74.9 INR     warfarin (COUMADIN) 7.5 MG tablet   2. Factor V Leiden (H) D68.51 INR     warfarin (COUMADIN) 7.5 MG tablet   3. Tension headache G44.209    4. Hypertrophy of breast N62      INR high goal.  Refilled warfarin as previous scripts were cancelled by pharmacy due to duplication.      Discussed may trial massage for scalp tenderness.  If ineffective may consider further workup but will wait given low level of tenderness.      Breast thickening noted in same area of concern she had a mammogram 6 months ago.  Will monitor.  If still present or changing  in 1 month may consider further imaging.    See Patient Instructions    SWAPNA Santana CNP  [facility]

## 2018-02-15 ENCOUNTER — MYC MEDICAL ADVICE (OUTPATIENT)
Dept: FAMILY MEDICINE | Facility: CLINIC | Age: 43
End: 2018-02-15

## 2018-02-15 DIAGNOSIS — N60.11 FIBROCYSTIC CHANGE OF BREAST, RIGHT: Primary | ICD-10-CM

## 2018-02-15 NOTE — TELEPHONE ENCOUNTER
"Jaquelin Duarte review:   Zayra would like to get mammo views of the right breast as it has a \"different feeling\"  Notes described a thickening.  She is now a week post period and it is still bothering her.  Family hx of breast CA.    She discussed this with you at last visit.  Can you check order for sign off or make any changes if appropriate?  Emily Hernandez RN        "

## 2018-02-21 ENCOUNTER — MYC MEDICAL ADVICE (OUTPATIENT)
Dept: FAMILY MEDICINE | Facility: CLINIC | Age: 43
End: 2018-02-21

## 2018-02-21 DIAGNOSIS — N60.11 FIBROCYSTIC DISEASE OF RIGHT BREAST: Primary | ICD-10-CM

## 2018-02-27 ENCOUNTER — HOSPITAL ENCOUNTER (OUTPATIENT)
Dept: MAMMOGRAPHY | Facility: CLINIC | Age: 43
End: 2018-02-27
Attending: NURSE PRACTITIONER
Payer: COMMERCIAL

## 2018-02-27 ENCOUNTER — HOSPITAL ENCOUNTER (OUTPATIENT)
Dept: MAMMOGRAPHY | Facility: CLINIC | Age: 43
Discharge: HOME | End: 2018-02-27
Attending: NURSE PRACTITIONER | Admitting: NURSE PRACTITIONER
Payer: COMMERCIAL

## 2018-02-27 DIAGNOSIS — N60.11 FIBROCYSTIC DISEASE OF RIGHT BREAST: ICD-10-CM

## 2018-02-27 PROCEDURE — 76642 ULTRASOUND BREAST LIMITED: CPT | Mod: RT

## 2018-02-27 PROCEDURE — G0279 TOMOSYNTHESIS, MAMMO: HCPCS

## 2018-04-02 DIAGNOSIS — I74.9 EMBOLISM AND THROMBOSIS (H): ICD-10-CM

## 2018-04-02 DIAGNOSIS — I26.99 PULMONARY EMBOLISM AND INFARCTION (H): ICD-10-CM

## 2018-04-03 NOTE — TELEPHONE ENCOUNTER
"Requested Prescriptions   Pending Prescriptions Disp Refills     JANTOVEN 2.5 MG tablet [Pharmacy Med Name: JANTOVEN TAB 2.5MG]  Last Written Prescription Date:  4-17-17  Last Fill Quantity: 40 tab,  # refills: 3   Last office visit: 1/31/2018 with prescribing provider:  Jaquelin Duarte    Future Office Visit:    39 tablet 3     Sig: FOR DIRECTIONS ON HOW TO   TAKE THIS MEDICINE, READ   THE ENCLOSED MEDICATION    INFORMATION FORM    Vitamin K Antagonists Failed    4/2/2018  4:12 AM       Failed - INR is within goal in the past 6 weeks    Confirm INR is within goal in the past 6 weeks.     Recent Labs   Lab Test  01/31/18   1347   INR  3.00*          Passed - Recent (12 mo) or future (30 days) visit within the authorizing provider's specialty    Patient had office visit in the last 12 months or has a visit in the next 30 days with authorizing provider or within the authorizing provider's specialty.  See \"Patient Info\" tab in inbasket, or \"Choose Columns\" in Meds & Orders section of the refill encounter.           Passed - Patient is 18 years of age or older       Passed - Patient is not pregnant       Passed - No positive pregnancy on file in past 12 months          "

## 2018-04-06 DIAGNOSIS — I74.9 EMBOLISM AND THROMBOSIS (H): Primary | ICD-10-CM

## 2018-04-06 LAB — INR PPP: 2.7 (ref 0.86–1.14)

## 2018-04-06 PROCEDURE — 85610 PROTHROMBIN TIME: CPT | Performed by: NURSE PRACTITIONER

## 2018-04-06 PROCEDURE — 36416 COLLJ CAPILLARY BLOOD SPEC: CPT | Performed by: NURSE PRACTITIONER

## 2018-04-06 NOTE — TELEPHONE ENCOUNTER
No refills allowed by [facility] team. Patient never followed up as planned in recent years (last visit 2016). Refills must come from PCP. PETER CosbyN, RN

## 2018-04-08 ENCOUNTER — MYC MEDICAL ADVICE (OUTPATIENT)
Dept: FAMILY MEDICINE | Facility: CLINIC | Age: 43
End: 2018-04-08

## 2018-04-08 DIAGNOSIS — I26.99 PULMONARY EMBOLISM AND INFARCTION (H): Primary | ICD-10-CM

## 2018-04-10 RX ORDER — WARFARIN SODIUM 2.5 MG/1
TABLET ORAL
Qty: 39 TABLET | Refills: 3 | Status: SHIPPED | OUTPATIENT
Start: 2018-04-10 | End: 2018-09-28

## 2018-08-27 DIAGNOSIS — I26.99 PULMONARY EMBOLISM AND INFARCTION (H): ICD-10-CM

## 2018-08-27 LAB — INR PPP: 2.1 (ref 0.86–1.14)

## 2018-08-27 PROCEDURE — 36416 COLLJ CAPILLARY BLOOD SPEC: CPT | Performed by: NURSE PRACTITIONER

## 2018-08-27 PROCEDURE — 85610 PROTHROMBIN TIME: CPT | Performed by: NURSE PRACTITIONER

## 2018-09-13 ENCOUNTER — TELEPHONE (OUTPATIENT)
Dept: FAMILY MEDICINE | Facility: CLINIC | Age: 43
End: 2018-09-13

## 2018-09-24 ENCOUNTER — TELEPHONE (OUTPATIENT)
Dept: FAMILY MEDICINE | Facility: CLINIC | Age: 43
End: 2018-09-24

## 2018-09-24 NOTE — TELEPHONE ENCOUNTER
Mackenzie in [facility] please review this message you certainly can send back to triage after your input.

## 2018-09-24 NOTE — TELEPHONE ENCOUNTER
Reason for Call:  Medication or medication refill:    Do you use a [facility]?  Name of the pharmacy and phone number for the current request:  [facility] - [place], [place] - [address] AT PORTAL TO REGISTERED [facility] SITES    Name of the medication requested: JANTOVEN 7.5 MG tablet    Other request: Pharmacy is requesting a call back for clarity on the medication above. States it was prescribed as 0.1 tablets but to take 1 tablet daily, and is this for a 30 or 90-day supply. Please advise. Reference #[id number]. Thanks!    Can we leave a detailed message on this number? Not Applicable    Phone number patient can be reached at: Other phone number: [phone]    Best Time: Any    Call taken on 9/24/2018 at 3:06 PM by Kathy Loredo

## 2018-09-24 NOTE — TELEPHONE ENCOUNTER
Patient was discharged from [facility] service over a year ago. Until she reestablishes, refills must come from PCP directly.     Jaquelin, see notes below and sign new order. THANKS! Mackenzie Schuler, PETERN, RN

## 2018-09-25 NOTE — TELEPHONE ENCOUNTER
Message left for patient to return call to clinic and ask to speak to available triage nurse     Viktoriya Becerra Registered Nurse   [facility]

## 2018-09-25 NOTE — TELEPHONE ENCOUNTER
Triage, please inform patient of message below.   As stated, patient has not been an [facility] pt for several years now.   She does have an initial appt with [facility] on Friday 9/28, but sounds like she needs a visit with Jaquelin or a new PCP prior to get rx filled.   Mackenzie Schuler, PETERN, RN

## 2018-09-26 NOTE — TELEPHONE ENCOUNTER
Phone call to patient   Discussed appt. with pcp prior to INR clinic visit   Patient agreeable to an appt. With pcp prior to INR visit for physical - RN assisted with scheduling appt. for 9/28/18  Patient asked if she should come fasting to appt? RN advised that if she felt she could fast that long she certainly could however could also do fasting lab appt. At different date as well earlier in the day - last FLP was 2016    Viktoriya Becerra Registered Nurse   [facility]

## 2018-09-28 ENCOUNTER — OFFICE VISIT (OUTPATIENT)
Dept: FAMILY MEDICINE | Facility: CLINIC | Age: 43
End: 2018-09-28
Payer: COMMERCIAL

## 2018-09-28 ENCOUNTER — ANTICOAGULATION THERAPY VISIT (OUTPATIENT)
Dept: NURSING | Facility: CLINIC | Age: 43
End: 2018-09-28
Payer: COMMERCIAL

## 2018-09-28 VITALS
WEIGHT: 177 LBS | HEIGHT: 70 IN | DIASTOLIC BLOOD PRESSURE: 70 MMHG | BODY MASS INDEX: 25.34 KG/M2 | TEMPERATURE: 98.6 F | RESPIRATION RATE: 16 BRPM | HEART RATE: 74 BPM | SYSTOLIC BLOOD PRESSURE: 130 MMHG

## 2018-09-28 DIAGNOSIS — I26.99 PULMONARY EMBOLISM AND INFARCTION (H): ICD-10-CM

## 2018-09-28 DIAGNOSIS — Z00.00 WELL FEMALE EXAM WITHOUT GYNECOLOGICAL EXAM: Primary | ICD-10-CM

## 2018-09-28 DIAGNOSIS — Z79.01 LONG-TERM (CURRENT) USE OF ANTICOAGULANTS: ICD-10-CM

## 2018-09-28 DIAGNOSIS — I74.9 EMBOLISM AND THROMBOSIS (H): ICD-10-CM

## 2018-09-28 DIAGNOSIS — D68.51 HOMOZYGOUS FACTOR V LEIDEN MUTATION (H): ICD-10-CM

## 2018-09-28 DIAGNOSIS — Z23 NEED FOR PROPHYLACTIC VACCINATION AND INOCULATION AGAINST INFLUENZA: ICD-10-CM

## 2018-09-28 DIAGNOSIS — D68.51 FACTOR V LEIDEN (H): ICD-10-CM

## 2018-09-28 LAB
CHOLEST SERPL-MCNC: 170 MG/DL
GLUCOSE SERPL-MCNC: 96 MG/DL (ref 70–99)
HDLC SERPL-MCNC: 53 MG/DL
INR POINT OF CARE: 2.3 (ref 0.86–1.14)
LDLC SERPL CALC-MCNC: 103 MG/DL
NONHDLC SERPL-MCNC: 117 MG/DL
TRIGL SERPL-MCNC: 69 MG/DL

## 2018-09-28 PROCEDURE — 85610 PROTHROMBIN TIME: CPT | Mod: QW

## 2018-09-28 PROCEDURE — 80061 LIPID PANEL: CPT | Performed by: NURSE PRACTITIONER

## 2018-09-28 PROCEDURE — 82947 ASSAY GLUCOSE BLOOD QUANT: CPT | Performed by: NURSE PRACTITIONER

## 2018-09-28 PROCEDURE — 90686 IIV4 VACC NO PRSV 0.5 ML IM: CPT | Performed by: NURSE PRACTITIONER

## 2018-09-28 PROCEDURE — 99396 PREV VISIT EST AGE 40-64: CPT | Mod: 25 | Performed by: NURSE PRACTITIONER

## 2018-09-28 PROCEDURE — 90471 IMMUNIZATION ADMIN: CPT | Performed by: NURSE PRACTITIONER

## 2018-09-28 PROCEDURE — 36416 COLLJ CAPILLARY BLOOD SPEC: CPT

## 2018-09-28 PROCEDURE — 99207 ZZC NO CHARGE NURSE ONLY: CPT

## 2018-09-28 RX ORDER — WARFARIN SODIUM 7.5 MG/1
7.5 TABLET ORAL DAILY
Qty: 90 TABLET | Refills: 0 | Status: SHIPPED | OUTPATIENT
Start: 2018-09-28 | End: 2018-11-20

## 2018-09-28 NOTE — PROGRESS NOTES
ANTICOAGULATION INITIAL CLINIC VISIT    Patient Name:  Zayra Altamirano  Date:  9/28/2018  Referred by: Jaquelin Duarte  Contact Type:  Face to Face    SUBJECTIVE:     Briefly reviewed Coumadin education as patient has been on Warfarin for many years.    Of note, patient reports higher maintenance dose of 10 mg M + 7.5 mg ROW in prior years. Each year she seems to require a little less. Patient is very sensitive to alcohol.        Patient Findings     Comments Patient reestablishing with  ACC team. PCP and patient discussed longstanding compliance issues and safety concerns. Patient understands ACC program and guideline and agrees to follow as recommended. Of note, patient has her own Co[employer] machine. Calibration performed today. However, patient informed that [facility] will NOT be able to honor any home checks as she is not enrolled in a home monitoring program. Patient again verbalized understanding.          OBJECTIVE    INR Protime   Date Value Ref Range Status   09/28/2018 2.3 (A) 0.86 - 1.14 Final       ASSESSMENT / PLAN  INR assessment THER    Recheck INR In: 4 WEEKS    INR Location Clinic      Anticoagulation Summary as of 9/28/2018     INR goal 2.0-3.0   Today's INR 2.3   Warfarin maintenance plan 5 mg (2.5 mg x 2) on Tue; 7.5 mg (7.5 mg x 1) all other days   Full warfarin instructions 5 mg on Tue; 7.5 mg all other days   Weekly warfarin total 50 mg   Plan last modified Mackenzie Schuler RN (9/28/2018)   Next INR check 10/26/2018   Priority INR   Target end date     Indications   Long-term (current) use of anticoagulants [Z79.01] [Z79.01]  Pulmonary embolism and infarction (H) [I26.99]  Embolism and thrombosis (H) [I74.9]  MARNI DEF CLOT FACTOR NEC  Factor V Leiden homozygous- on lovenox 80mg bid [D68.2]         Anticoagulation Episode Summary     INR check location     Preferred lab     Send INR reminders to  ANTICOAG CLINIC    Comments 3 month Warfarin refills MAX per SB (9/28/18). Pt needs  to see HEME within 3 months (12/28/18). Previous years of noncompliance. Mother recently had stroke d/t poor bridging. Two kids: 12 daughter, son (age?)      Anticoagulation Care Providers     Provider Role Specialty Phone number    Jaquelin Duarte, APRN CNP Responsible Nurse Practitioner [phone]            See the Encounter Report to view Anticoagulation Flowsheet and Dosing Calendar (Go to Encounters tab in chart review, and find the Anticoagulation Therapy Visit)    Writer suggested that patient change weekly dose to 5 mg every Tuesday + 7.5 mg ROW instead of cutting pills in half twice/week. Patient in agreement and will recheck in 4 weeks.     Dosage adjustment made based on physician directed care plan.    Mackenzie Schuler RN

## 2018-09-28 NOTE — MR AVS SNAPSHOT
After Visit Summary   9/28/2018    Zayra Altamirano    MRN: [id number]           Patient Information     Date Of Birth          1975        Visit Information        Provider Department      9/28/2018 11:40 AM Jaquelin Duarte APRN [facility]        Today's Diagnoses     Well female exam without gynecological exam    -  1    Pulmonary embolism and infarction (H)        Embolism and thrombosis (H)        Homozygous Factor V Leiden mutation (H)        Factor V Leiden (H)        Need for prophylactic vaccination and inoculation against influenza          Care Instructions      Preventive Health Recommendations  Female Ages 40 to 49    Yearly exam:     See your health care provider every year in order to  1. Review health changes.   2. Discuss preventive care.    3. Review your medicines if your doctor prescribed any.      Get a Pap test every three years (unless you have an abnormal result and your provider advises testing more often).      If you get Pap tests with HPV test, you only need to test every 5 years, unless you have an abnormal result. You do not need a Pap test if your uterus was removed (hysterectomy) and you have not had cancer.      You should be tested each year for STDs (sexually transmitted diseases), if you're at risk.     Ask your doctor if you should have a mammogram.      Have a colonoscopy (test for colon cancer) if someone in your family has had colon cancer or polyps before age 50.       Have a cholesterol test every 5 years.       Have a diabetes test (fasting glucose) after age 45. If you are at risk for diabetes, you should have this test every 3 years.    Shots: Get a flu shot each year. Get a tetanus shot every 10 years.     Nutrition:     Eat at least 5 servings of fruits and vegetables each day.    Eat whole-grain bread, whole-wheat pasta and brown rice instead of white grains and rice.    Get adequate Calcium and Vitamin  D.      Lifestyle    Exercise at least 150 minutes a week (an average of 30 minutes a day, 5 days a week). This will help you control your weight and prevent disease.    Limit alcohol to one drink per day.    No smoking.     Wear sunscreen to prevent skin cancer.    See your dentist every six months for an exam and cleaning.          Follow-ups after your visit        Additional Services     INR CLINIC REFERRAL       Your provider has referred you to INR Services.    Please be aware that coverage of these services is subject to the terms and limitations of your health insurance plan.  Call member services at your health plan with any benefit or coverage questions.    Indication for Anticoagulation: DVT (recurrent)  If nonstandard INR is desired, indicate goal range and explanation: 2-3  Expected Duration of Therapy: Lifetime            ONC/HEME ADULT REFERRAL       Your provider has referred you to: [facility] [phone]  [email or web address]    Please be aware that coverage of these services is subject to the terms and limitations of your health insurance plan.  Call member services at your health plan with any benefit or coverage questions.      Please bring the following with you to your appointment:    (1) Any X-Rays, CTs or MRIs which have been performed.  Contact the facility where they were done to arrange for  prior to your scheduled appointment.   (2) List of current medications  (3) This referral request   (4) Any documents/labs given to you for this referral                  Your next 10 appointments already scheduled     Oct 26, 2018 11:45 AM CDT   Anticoagulation Visit with  INR CLINIC   [facility] ([facility])    [address] [address] [phone]              Future tests that were ordered for you today     Open  "Standing Orders        Priority Remaining Interval Expires Ordered    INR Routine 99/99  9/28/2019 9/28/2018            Who to contact     If you have questions or need follow up information about today's clinic visit or your schedule please contact [facility] directly at [phone].  Normal or non-critical lab and imaging results will be communicated to you by MyChart, letter or phone within 4 business days after the clinic has received the results. If you do not hear from us within 7 days, please contact the clinic through [employer] or phone. If you have a critical or abnormal lab result, we will notify you by phone as soon as possible.  Submit refill requests through [employer] or call your pharmacy and they will forward the refill request to us. Please allow 3 business days for your refill to be completed.          Additional Information About Your Visit        MyChart Information     [employer] gives you secure access to your electronic health record. If you see a primary care provider, you can also send messages to your care team and make appointments. If you have questions, please call your primary care clinic.  If you do not have a primary care provider, please call [phone] and they will assist you.        Care EveryWhere ID     This is your Care EveryWhere ID. This could be used by other organizations to access your [facility] medical records  [id number]        Your Vitals Were     Pulse Temperature Respirations Height Last Period BMI (Body Mass Index)    74 98.6  F (37  C) (Oral) 16 5' 9.5\" (1.765 m) 09/05/2018 25.76 kg/m2       Blood Pressure from Last 3 Encounters:   09/28/18 130/70   01/31/18 122/67   09/27/17 113/69    Weight from Last 3 Encounters:   09/28/18 177 lb (80.3 kg)   01/31/18 177 lb (80.3 kg)   09/27/17 172 lb 8 oz (78.2 kg)              We Performed the Following     FLU VACCINE, SPLIT VIRUS, IM (QUADRIVALENT) [19020]- >3 YRS     Glucose     INR CLINIC REFERRAL     INR  "    Lipid panel reflex to direct LDL Fasting     ONC/HEME ADULT REFERRAL     Vaccine Administration, Initial [22522]          Today's Medication Changes          These changes are accurate as of 9/28/18  1:18 PM.  If you have any questions, ask your nurse or doctor.               These medicines have changed or have updated prescriptions.        Dose/Directions    warfarin 7.5 MG tablet   Commonly known as:  JANTOVEN   This may have changed:    - See the new instructions.  - Another medication with the same name was removed. Continue taking this medication, and follow the directions you see here.   Used for:  Embolism and thrombosis (H), Factor V Leiden (H)   Changed by:  Jaquelin Duarte APRN CNP        Dose:  7.5 mg   Take 1 tablet (7.5 mg) by mouth daily   Quantity:  90 tablet   Refills:  0            Where to get your medicines      These medications were sent to [facility] - [place], [place] - [address] AT [address] to [address], [facility] [address]     Phone:  [phone]     warfarin 7.5 MG tablet                Primary Care Provider Office Phone # Fax #    SWAPNA Santana [phone] [address] [facility] [address]        Equal Access to Services     FAUSTO CAR AH: Hadii surya godoy hadasho Soomaali, waaxda luqadaha, qaybta kaalmada adeegyada, alem mihcel. So [facility] [phone].    ATENCIÓN: Si habla español, tiene a bowie disposición servicios gratuitos de asistencia lingüística. Llame al [phone].    We comply with applicable federal civil rights laws and [place] laws. We do not discriminate on the basis of race, color, national origin, age, disability, sex, sexual orientation, or gender identity.            Thank you!     Thank you for choosing [facility]  for your care. Our goal is always to provide you with excellent care. Hearing back from our patients  is one way we can continue to improve our services. Please take a few minutes to complete the written survey that you may receive in the mail after your visit with us. Thank you!             Your Updated Medication List - Protect others around you: Learn how to safely use, store and throw away your medicines at www.disposemymeds.org.          This list is accurate as of 9/28/18  1:18 PM.  Always use your most recent med list.                   Brand Name Dispense Instructions for use Diagnosis    TYLENOL PO      Take 500 mg by mouth        warfarin 7.5 MG tablet    JANTOVEN    90 tablet    Take 1 tablet (7.5 mg) by mouth daily    Embolism and thrombosis (H), Factor V Leiden (H)

## 2018-09-28 NOTE — MR AVS SNAPSHOT
Zayra Altamirano   9/28/2018 1:00 PM   Anticoagulation Therapy Visit    Description:  43 year old female   Provider:   INR CLINIC   Department:   Nurse           INR as of 9/28/2018     Today's INR 2.3      Anticoagulation Summary as of 9/28/2018     INR goal 2.0-3.0   Today's INR 2.3   Full warfarin instructions 5 mg on Tue; 7.5 mg all other days   Next INR check 10/26/2018    Indications   Long-term (current) use of anticoagulants [Z79.01] [Z79.01]  Pulmonary embolism and infarction (H) [I26.99]  Embolism and thrombosis (H) [I74.9]  MARNI DEF CLOT FACTOR NEC  Factor V Leiden homozygous- on lovenox 80mg bid [D68.2]         Your next Anticoagulation Clinic appointment(s)     Oct 26, 2018 11:45 AM CDT   Anticoagulation Visit with  INR CLINIC   [facility] ([facility]    [address] [place] [address] [phone]              Contact Numbers     [facility]  Please call [phone] to cancel and/or reschedule your appointment   Please call [phone] with any problems or questions regarding your therapy.        September 2018 Details    Sun Mon Tue Wed Thu Fri Sat           1                 2               3               4               5               6               7               8                 9               10               11               12               13               14               15                 16               17               18               19               20               21               22                 23               24               25               26               27               28      7.5 mg   See details      29      7.5 mg           30      7.5 mg                Date Details   09/28 This INR check               How to take your warfarin dose     To take:  7.5 mg Take 1 of the 7.5 mg tablets.           October 2018 Details    Sun Mon Tue Wed Thu Fri Sat      1      7.5 mg         2       5 mg         3      7.5 mg         4      7.5 mg         5      7.5 mg         6      7.5 mg           7      7.5 mg         8      7.5 mg         9      5 mg         10      7.5 mg         11      7.5 mg         12      7.5 mg         13      7.5 mg           14      7.5 mg         15      7.5 mg         16      5 mg         17      7.5 mg         18      7.5 mg         19      7.5 mg         20      7.5 mg           21      7.5 mg         22      7.5 mg         23      5 mg         24      7.5 mg         25      7.5 mg         26            27                 28               29               30               31                   Date Details   No additional details    Date of next INR:  10/26/2018         How to take your warfarin dose     To take:  5 mg Take 2 of the 2.5 mg tablets.    To take:  7.5 mg Take 1 of the 7.5 mg tablets.

## 2018-09-28 NOTE — PROGRESS NOTES
SUBJECTIVE:   CC: Zayra Altamirano is an 43 year old woman who presents for preventive health visit.     Physical   Annual:     Getting at least 3 servings of Calcium per day:  Yes    Bi-annual eye exam:  Yes    Dental care twice a year:  Yes    Sleep apnea or symptoms of sleep apnea:  None    Diet:  Regular (no restrictions)    Frequency of exercise:  6-7 days/week    Duration of exercise:  30-45 minutes    Taking medications regularly:  Yes    Medication side effects:  None    Additional concerns today:  No        Today's PHQ-2 Score:   PHQ-2 ( 1999 Pfizer) 9/27/2017   Q1: Little interest or pleasure in doing things 0   Q2: Feeling down, depressed or hopeless 0   PHQ-2 Score 0   Q1: Little interest or pleasure in doing things -   Q2: Feeling down, depressed or hopeless -   PHQ-2 Score -       Abuse: Current or Past(Physical, Sexual or Emotional)- No  Do you feel safe in your environment - Yes    Social History   Substance Use Topics     Smoking status: Never Smoker     Smokeless tobacco: Never Used     Alcohol use 0.6 oz/week     1 Standard drinks or equivalent per week     No flowsheet data found.  Reviewed orders with patient.  Reviewed health maintenance and updated orders accordingly - Yes    Mammogram not appropriate for this patient based on age.    Pertinent mammograms are reviewed under the imaging tab.  History of abnormal Pap smear: NO - age 30-65 PAP every 5 years with negative HPV co-testing recommended  PAP / HPV Latest Ref Rng & Units 4/19/2017 3/20/2014 5/20/2013   PAP - NIL NIL NIL   HPV 16 DNA NEG Negative - -   HPV 18 DNA NEG Negative - -   OTHER HR HPV NEG Negative - -     Reviewed and updated as needed this visit by clinical staff         Reviewed and updated as needed this visit by Provider          Review of Systems  CONSTITUTIONAL: NEGATIVE for fever, chills, change in weight  INTEGUMENTARU/SKIN: NEGATIVE for worrisome rashes, moles or lesions  EYES: NEGATIVE for vision  "changes or irritation  ENT: NEGATIVE for ear, mouth and throat problems  RESP: NEGATIVE for significant cough or SOB  BREAST: NEGATIVE for masses, tenderness or discharge  CV: NEGATIVE for chest pain, palpitations or peripheral edema  GI: NEGATIVE for nausea, abdominal pain, heartburn, or change in bowel habits  : NEGATIVE for unusual urinary or vaginal symptoms. Periods are regular.  MUSCULOSKELETAL: NEGATIVE for significant arthralgias or myalgia  NEURO: NEGATIVE for weakness, dizziness or paresthesias  PSYCHIATRIC: NEGATIVE for changes in mood or affect     OBJECTIVE:   /70  Pulse 74  Temp 98.6  F (37  C) (Oral)  Resp 16  Ht 5' 9.5\" (1.765 m)  Wt 177 lb (80.3 kg)  LMP 09/05/2018  BMI 25.76 kg/m2  Physical Exam  GENERAL: healthy, alert and no distress  EYES: Eyes grossly normal to inspection, PERRL and conjunctivae and sclerae normal  HENT: ear canals and TM's normal, nose and mouth without ulcers or lesions  NECK: no adenopathy, no asymmetry, masses, or scars and thyroid normal to palpation  RESP: lungs clear to auscultation - no rales, rhonchi or wheezes  BREAST: normal without masses, tenderness or nipple discharge and no palpable axillary masses or adenopathy  CV: regular rate and rhythm, normal S1 S2, no S3 or S4, no murmur, click or rub, no peripheral edema and peripheral pulses strong  ABDOMEN: soft, nontender, no hepatosplenomegaly, no masses and bowel sounds normal  MS: no gross musculoskeletal defects noted, no edema  SKIN: no suspicious lesions or rashes  NEURO: Normal strength and tone, mentation intact and speech normal  PSYCH: mentation appears normal, affect normal/bright      ASSESSMENT/PLAN:       ICD-10-CM    1. Well female exam without gynecological exam Z00.00 Lipid panel reflex to direct LDL Fasting     Glucose   2. Pulmonary embolism and infarction (H) I26.99 ONC/HEME ADULT REFERRAL     INR CLINIC REFERRAL     INR   3. Embolism and thrombosis (H) I74.9 ONC/HEME ADULT REFERRAL " "    warfarin (JANTOVEN) 7.5 MG tablet     INR CLINIC REFERRAL     INR   4. Homozygous Factor V Leiden mutation (H) D68.51 ONC/HEME ADULT REFERRAL     INR CLINIC REFERRAL     INR     INR     CANCELED: INR point of care   5. Factor V Leiden (H) D68.51 warfarin (JANTOVEN) 7.5 MG tablet     INR CLINIC REFERRAL     INR   6. Need for prophylactic vaccination and inoculation against influenza Z23 FLU VACCINE, SPLIT VIRUS, IM (QUADRIVALENT) [68486]- >3 YRS     Vaccine Administration, Initial [79770]      well female,  fasting for labs.  Encouraged to continue exercise and healthy diet choices.      Will reestablish with hematology as it has been years since she was seen there.    Reestablish with INR [facility] clinic.  INR today.  Has been stable on 7.5 mg coumadin daily.  Watching diet closely.      Flu shot given.    COUNSELING:  Reviewed preventive health counseling, as reflected in patient instructions    BP Readings from Last 1 Encounters:   01/31/18 122/67     Estimated body mass index is 25.76 kg/(m^2) as calculated from the following:    Height as of 4/19/17: 5' 9.5\" (1.765 m).    Weight as of 1/31/18: 177 lb (80.3 kg).           reports that she has never smoked. She has never used smokeless tobacco.      Counseling Resources:  ATP IV Guidelines  Pooled Cohorts Equation Calculator  Breast Cancer Risk Calculator  FRAX Risk Assessment  ICSI Preventive Guidelines  Dietary Guidelines for Americans, 2010  USDA's MyPlate  ASA Prophylaxis  Lung CA Screening    SWAPNA Santana CNP  [facility]  Answers for HPI/ROS submitted by the patient on 9/28/2018   PHQ-2 Score: 0      Injectable Influenza Immunization Documentation    1.  Is the person to be vaccinated sick today?   No    2. Does the person to be vaccinated have an allergy to a component   of the vaccine?   No  Egg Allergy Algorithm Link    3. Has the person to be vaccinated ever had a serious reaction   to influenza vaccine in the past?   " No    4. Has the person to be vaccinated ever had Guillain-Barré syndrome?   No    Form completed by Sho Daniel MA

## 2018-10-26 ENCOUNTER — ANTICOAGULATION THERAPY VISIT (OUTPATIENT)
Dept: NURSING | Facility: CLINIC | Age: 43
End: 2018-10-26
Payer: COMMERCIAL

## 2018-10-26 DIAGNOSIS — I26.99 PULMONARY EMBOLISM AND INFARCTION (H): ICD-10-CM

## 2018-10-26 DIAGNOSIS — Z79.01 LONG TERM (CURRENT) USE OF ANTICOAGULANTS: ICD-10-CM

## 2018-10-26 DIAGNOSIS — D68.51 HOMOZYGOUS FACTOR V LEIDEN MUTATION (H): Primary | ICD-10-CM

## 2018-10-26 LAB — INR POINT OF CARE: 1.9 (ref 0.86–1.14)

## 2018-10-26 PROCEDURE — 85610 PROTHROMBIN TIME: CPT | Mod: QW

## 2018-10-26 PROCEDURE — 99207 ZZC NO CHARGE NURSE ONLY: CPT

## 2018-10-26 PROCEDURE — 36416 COLLJ CAPILLARY BLOOD SPEC: CPT

## 2018-10-26 NOTE — MR AVS SNAPSHOT
Zayra Altamirano   10/26/2018 11:45 AM   Anticoagulation Therapy Visit    Description:  43 year old female   Provider:   INR CLINIC   Department:   Nurse           INR as of 10/26/2018     Today's INR 1.9!      Anticoagulation Summary as of 10/26/2018     INR goal 2.0-3.0   Today's INR 1.9!   Full warfarin instructions 7.5 mg every day   Next INR check 11/20/2018    Indications   Long-term (current) use of anticoagulants [Z79.01] [Z79.01]  Pulmonary embolism and infarction (H) [I26.99]  Embolism and thrombosis (H) [I74.9]  MARNI DEF CLOT FACTOR NEC  Factor V Leiden homozygous- on lovenox 80mg bid [D68.2]         Your next Anticoagulation Clinic appointment(s)     Nov 20, 2018  3:15 PM CST   Anticoagulation Visit with  INR CLINIC   [facility] ([facility])    [address] [place] [address] [phone]              Contact Numbers     [facility]  Please call [phone] to cancel and/or reschedule your appointment   Please call [phone] with any problems or questions regarding your therapy.        October 2018 Details    Sun Mon Tue Wed Thu Fri Sat      1               2               3               4               5               6                 7               8               9               10               11               12               13                 14               15               16               17               18               19               20                 21               22               23               24               25               26      7.5 mg   See details      27      7.5 mg           28      7.5 mg         29      7.5 mg         30      7.5 mg         31      7.5 mg             Date Details   10/26 This INR check               How to take your warfarin dose     To take:  7.5 mg Take 1 of the 7.5 mg tablets.           November 2018 Details    Sun Mon Tue Wed Thu Fri Sat         1      7.5  mg         2      7.5 mg         3      7.5 mg           4      7.5 mg         5      7.5 mg         6      7.5 mg         7      7.5 mg         8      7.5 mg         9      7.5 mg         10      7.5 mg           11      7.5 mg         12      7.5 mg         13      7.5 mg         14      7.5 mg         15      7.5 mg         16      7.5 mg         17      7.5 mg           18      7.5 mg         19      7.5 mg         20            21               22               23               24                 25               26               27               28               29               30                 Date Details   No additional details    Date of next INR:  11/20/2018         How to take your warfarin dose     To take:  7.5 mg Take 1 of the 7.5 mg tablets.

## 2018-10-26 NOTE — PROGRESS NOTES
ANTICOAGULATION FOLLOW-UP CLINIC VISIT    Patient Name:  Zayra Altamirano  Date:  10/26/2018  Contact Type:  Face to Face    SUBJECTIVE:     Patient Findings     Positives Change in diet/appetite (Decrease in calories. ), Activity level change (Pt reports increased daily exercise. )           OBJECTIVE    INR Protime   Date Value Ref Range Status   10/26/2018 1.9 (A) 0.86 - 1.14 Final       ASSESSMENT / PLAN  INR assessment THER    Recheck INR In: 4 WEEKS    INR Location Clinic      Anticoagulation Summary as of 10/26/2018     INR goal 2.0-3.0   Today's INR 1.9!   Warfarin maintenance plan 7.5 mg (7.5 mg x 1) every day   Full warfarin instructions 7.5 mg every day   Weekly warfarin total 52.5 mg   Plan last modified Mackenzie Schuler RN (10/26/2018)   Next INR check 11/20/2018   Priority INR   Target end date     Indications   Long-term (current) use of anticoagulants [Z79.01] [Z79.01]  Pulmonary embolism and infarction (H) [I26.99]  Embolism and thrombosis (H) [I74.9]  MARNI DEF CLOT FACTOR NEC  Factor V Leiden homozygous- on lovenox 80mg bid [D68.2]         Anticoagulation Episode Summary     INR check location     Preferred lab     Send INR reminders to  ANTICOAG CLINIC    Comments 3 month Warfarin refills MAX per SB (9/28/18). Pt needs to see HEME within 3 months (12/28/18). Previous years of noncompliance. Mother recently had stroke d/t poor bridging. Two kids: 12 daughter, son (age?)      Anticoagulation Care Providers     Provider Role Specialty Phone number    Jaquelin Duarte APRN CNP Responsible Nurse Practitioner [phone]            See the Encounter Report to view Anticoagulation Flowsheet and Dosing Calendar (Go to Encounters tab in chart review, and find the Anticoagulation Therapy Visit)    Writer advised patient to increase daily dose to 7.5 mg to account for diet and activity change.     Patient made aware if signs of clotting (pain, tenderness, swelling, or color change in  any extremity) AND/OR bleeding occur (nosebleeds, bleeding gums, bruising, or blood in stool or urine) to notify provider & seek medical attention. If severe symptoms develop, such as major bleeding, chest pain, shortness of breath, fall, trauma or s/s of stroke, patient to call 911 immediately.       Mackenzie Schuler RN

## 2018-11-20 ENCOUNTER — ANTICOAGULATION THERAPY VISIT (OUTPATIENT)
Dept: NURSING | Facility: CLINIC | Age: 43
End: 2018-11-20
Payer: COMMERCIAL

## 2018-11-20 DIAGNOSIS — I74.9 EMBOLISM AND THROMBOSIS (H): ICD-10-CM

## 2018-11-20 DIAGNOSIS — D68.51 FACTOR V LEIDEN (H): ICD-10-CM

## 2018-11-20 DIAGNOSIS — I26.99 PULMONARY EMBOLISM AND INFARCTION (H): ICD-10-CM

## 2018-11-20 LAB — INR POINT OF CARE: 2.3 (ref 0.86–1.14)

## 2018-11-20 PROCEDURE — 36416 COLLJ CAPILLARY BLOOD SPEC: CPT

## 2018-11-20 PROCEDURE — 99207 ZZC NO CHARGE NURSE ONLY: CPT

## 2018-11-20 PROCEDURE — 85610 PROTHROMBIN TIME: CPT | Mod: QW

## 2018-11-20 RX ORDER — WARFARIN SODIUM 7.5 MG/1
TABLET ORAL
Qty: 95 TABLET | Refills: 0 | Status: SHIPPED | OUTPATIENT
Start: 2018-11-20 | End: 2019-02-08

## 2018-11-20 NOTE — PROGRESS NOTES
ANTICOAGULATION FOLLOW-UP CLINIC VISIT    Patient Name:  Zayra Altamirano  Date:  11/20/2018  Contact Type:  Face to Face    SUBJECTIVE:     Patient Findings     Positives No Problem Findings        **Pt has hematology f/u scheduled for 11/27/18.      OBJECTIVE    INR Protime   Date Value Ref Range Status   11/20/2018 2.3 (A) 0.86 - 1.14 Final       ASSESSMENT / PLAN  INR assessment THER    Recheck INR In: 4 WEEKS    INR Location Clinic      Anticoagulation Summary as of 11/20/2018     INR goal 2.0-3.0   Today's INR 2.3   Warfarin maintenance plan 7.5 mg (7.5 mg x 1) every day   Full warfarin instructions 7.5 mg every day   Weekly warfarin total 52.5 mg   No change documented Mackenzie Schuler RN   Plan last modified Mackenzie Schulre RN (10/26/2018)   Next INR check 12/18/2018   Priority INR   Target end date     Indications   Long-term (current) use of anticoagulants [Z79.01] [Z79.01]  Pulmonary embolism and infarction (H) [I26.99]  Embolism and thrombosis (H) [I74.9]  MARNI DEF CLOT FACTOR NEC  Factor V Leiden homozygous- on lovenox 80mg bid [D68.2]         Anticoagulation Episode Summary     INR check location     Preferred lab     Send INR reminders to  [facility]    Comments 3 month Warfarin refills MAX per SB (9/28/18). Pt needs to see HEME within 3 months (12/28/18). Previous years of noncompliance. Mother recently had stroke d/t poor bridging. Two kids: 12 daughter, son (age?)      Anticoagulation Care Providers     Provider Role Specialty Phone number    Jaquelin Duarte, SWAPNA CNP Responsible Nurse Practitioner [phone]            See the Encounter Report to view Anticoagulation Flowsheet and Dosing Calendar (Go to Encounters tab in chart review, and find the Anticoagulation Therapy Visit)    Patient made aware if signs of clotting (pain, tenderness, swelling, or color change in any extremity) AND/OR bleeding occur (nosebleeds, bleeding gums, bruising, or blood in stool or  urine) to notify provider & seek medical attention. If severe symptoms develop, such as major bleeding, chest pain, shortness of breath, fall, trauma or s/s of stroke, patient to call 911 immediately.       Mackenzie Schuler RN

## 2018-11-20 NOTE — MR AVS SNAPSHOT
Zayra Altamirano   11/20/2018 3:15 PM   Anticoagulation Therapy Visit    Description:  43 year old female   Provider:   INR CLINIC   Department:   Nurse           INR as of 11/20/2018     Today's INR 2.3      Anticoagulation Summary as of 11/20/2018     INR goal 2.0-3.0   Today's INR 2.3   Full warfarin instructions 7.5 mg every day   Next INR check 12/18/2018    Indications   Long-term (current) use of anticoagulants [Z79.01] [Z79.01]  Pulmonary embolism and infarction (H) [I26.99]  Embolism and thrombosis (H) [I74.9]  MARNI DEF CLOT FACTOR NEC  Factor V Leiden homozygous- on lovenox 80mg bid [D68.2]         Your next Anticoagulation Clinic appointment(s)     Dec 18, 2018 10:15 AM CST   Anticoagulation Visit with  INR CLINIC   [facility] ([facility]    [address] [place] [id number] [phone]              Contact Numbers     [facility]  Please call [phone] to cancel and/or reschedule your appointment   Please call [phone] with any problems or questions regarding your therapy.        November 2018 Details    Sun Mon Tue Wed Thu Fri Sat         1               2               3                 4               5               6               7               8               9               10                 11               12               13               14               15               16               17                 18               19               20      7.5 mg   See details      21      7.5 mg         22      7.5 mg         23      7.5 mg         24      7.5 mg           25      7.5 mg         26      7.5 mg         27      7.5 mg         28      7.5 mg         29      7.5 mg         30      7.5 mg           Date Details   11/20 This INR check               How to take your warfarin dose     To take:  7.5 mg Take 1 of the 7.5 mg tablets.           December 2018 Details    Sun Mon Tue Wed Thu Fri Sat            1      7.5 mg           2      7.5 mg         3      7.5 mg         4      7.5 mg         5      7.5 mg         6      7.5 mg         7      7.5 mg         8      7.5 mg           9      7.5 mg         10      7.5 mg         11      7.5 mg         12      7.5 mg         13      7.5 mg         14      7.5 mg         15      7.5 mg           16      7.5 mg         17      7.5 mg         18            19               20               21               22                 23               24               25               26               27               28               29                 30               31                     Date Details   No additional details    Date of next INR:  12/18/2018         How to take your warfarin dose     To take:  7.5 mg Take 1 of the 7.5 mg tablets.

## 2018-11-27 ENCOUNTER — OFFICE VISIT (OUTPATIENT)
Dept: HEMATOLOGY | Facility: CLINIC | Age: 43
End: 2018-11-27
Attending: INTERNAL MEDICINE
Payer: COMMERCIAL

## 2018-11-27 VITALS
WEIGHT: 179.8 LBS | RESPIRATION RATE: 14 BRPM | HEART RATE: 88 BPM | HEIGHT: 70 IN | TEMPERATURE: 97.9 F | DIASTOLIC BLOOD PRESSURE: 75 MMHG | SYSTOLIC BLOOD PRESSURE: 111 MMHG | OXYGEN SATURATION: 99 % | BODY MASS INDEX: 25.74 KG/M2

## 2018-11-27 DIAGNOSIS — Z79.01 LONG TERM CURRENT USE OF ANTICOAGULANT THERAPY: Primary | ICD-10-CM

## 2018-11-27 PROCEDURE — 99215 OFFICE O/P EST HI 40 MIN: CPT | Performed by: INTERNAL MEDICINE

## 2018-11-27 ASSESSMENT — PAIN SCALES - GENERAL: PAINLEVEL: NO PAIN (0)

## 2018-11-27 NOTE — PROGRESS NOTES
[facility] follow up visit      Zayra Altamirano is a 40-year-old woman who returns today to discuss her long-term anticoagulation management plan. She is a patient of Dr Mazin Garcia and I am seeing her in his absence     History from past notes describes     She has a history of estrogen-provoked venous thromboembolism, presenting in 2005 with a left lower extremity deep vein thrombosis and bilateral pulmonary emboli.  This event occurred within 1 year of starting the NuvaRing (vaginal estrogen).  She was subsequently found to be homozygous for factor V Leiden.  She was anticoagulated with full-intensity anticoagulation for 6 months, and then was switched to long-term low-intensity anticoagulation with an INR target of 1.5-2, as was a common practice at that time.       In , she delivered her first child by assist  section.  During that pregnancy, she was maintained on therapeutic anticoagulation with enoxaparin.  Following delivery, she developed a rectus sheath hematoma, which required interruption in her anticoagulation for a few weeks.  She eventually resumed full-intensity anticoagulation.  At that time, we were moving away from the practice of long-term low-intensity warfarin therapy, and, given that she was young and otherwise felt to be at low risk for bleeding, we placed her back on full-intensity anticoagulation with warfarin, which she has remained on to this time without difficulty.       Her family history is remarkable for her mother, who is heterozygous for factor V Leiden and has a history of pulmonary embolism associated with breast cancer chemotherapy.  Her paternal grandmother had a history of 2 deep vein thromboses; one occurring in her 40s, and the other in her 80s.  She is also heterozygous for factor V Leiden.  Note that neither of those events were estrogen-provoked, and this grandmother had a total of 8 pregnancies without clotting complications.  Zayra's brother is the only other  known individual in her family who is homozygous for factor V Leiden, and he has no history of thrombosis.       Again, Zayra has done well over the years on anticoagulation with no bleeding difficulties.  She is here today to revisit her long-term anticoagulation management plan.  Her overall health has otherwise remained excellent.         Past Medical History:   Diagnosis Date     Congenital deficiency of other clotting factors     Factor 5 Leiden homozygous mutation     DVT of leg (deep venous thrombosis) (H) 2005     Embolism and thrombosis of unspecified site     left leg that traveled to become PE     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  change goal INR 1.3-2.0     Rosacea      Past Surgical History:   Procedure Laterality Date     C EVENT MONITOR (CARDIAC - FL)  10/08    Normal/had palpatiations.      C/SECTION, LOW TRANSVERSE  2008    , Low Transverse      SECTION  2012    Procedure: SECTION; Surgeon:ELY KRAMER; Location: L+D      COLPOSCOPY VULVA W BIOPSY       Social History     Socioeconomic History     Marital status:      Spouse name: Not on file     Number of children: 2     Years of education: Not on file     Highest education level: Not on file   Social Needs     Financial resource strain: Not on file     Food insecurity - worry: Not on file     Food insecurity - inability: Not on file     Transportation needs - medical: Not on file     Transportation needs - non-medical: Not on file   Occupational History     Occupation:       Occupation:     Tobacco Use      Smoking status: Never Smoker     Smokeless tobacco: Never Used   Substance and Sexual Activity     Alcohol use: Yes     Alcohol/week: 0.6 oz     Comment: very occasional. 1 to 2x a month I may have a glass of wine     Drug use: No     Sexual activity: Yes     Partners: Male     Birth control/protection: Male Surgical     Comment:  had vasectomy   Other Topics Concern     Parent/sibling w/ CABG, MI or angioplasty before 65F 55M? No   Social History Narrative    Dairy/d 1-2 servings/d. 3 calcium/mag pills    Caffeine 0-1 servings/d    Exercise 3 week  walks     Sunscreen used - Yes    Seatbelts used - Yes    Working smoke/CO detectors in the home - Yes    Guns stored in the home - No    Self Breast Exams - Yes breastfeeding    Self Testicular Exam - NA    Eye Exam up to date YES    Dental Exam up to date - Yes    Pap Smear up to date - Yes    Mammogram up to date - NA    PSA up to date - NA    Dexa Scan up to date - NA    Flex Sig / Colonoscopy up to date - NA    Immunizations up to date - Yes tetanus vac 2004    Abuse: Current or Past(Physical, Sexual or Emotional)- No    Do you feel safe in your environment - Yes    periods Q 31, last 3-4 days    2/7/06  3/31/09    Emily Hernandez RN     Family History   Problem Relation Age of Onset     Lipids Father      Hypertension Father      Circulatory Father         veins stripped      Unknown/Adopted Father         hyperlipidemia      Cataracts Father      Blood Disease Sister         FACTOR 5 LEIDEN      Blood Disease Sister         FACTOR 5     Breast Cancer Maternal Grandmother      Diabetes Maternal Grandmother         Type 2     Cerebrovascular Disease Maternal Grandmother      Cardiovascular Maternal Grandmother         MI     Hypertension Maternal Grandmother      Cancer Maternal Grandfather         liver cancer      Breast Cancer Mother 63        IDC 2012, recurrent 2018.     Hypertension Mother      Cerebrovascular Disease Mother         Ischemic stroke  "3/18 at age 68.     Blood Disease Brother         FACTOR 5 LEIDEN HOMOZYGOUS        Allergies   Allergen Reactions     Benadryl Allergy Other (See Comments)     Had paradoxical reaction - made her very hyper     Current Outpatient Medications   Medication Sig Dispense Refill     Acetaminophen (TYLENOL PO) Take 500 mg by mouth       VITAMIN D, CHOLECALCIFEROL, PO Take 1,000 Units by mouth daily       warfarin (JANTOVEN) 7.5 MG tablet Current dose (11/20/18): 7.5 mg everyday OR as directed by ACC team. 95 tablet 0       Exam:   BP Readings from Last 1 Encounters:   11/27/18 111/75      Pulse Readings from Last 1 Encounters:   11/27/18 88      Resp Readings from Last 1 Encounters:   11/27/18 14      Temp Readings from Last 1 Encounters:   11/27/18 97.9  F (36.6  C) (Oral)      SpO2 Readings from Last 1 Encounters:   11/27/18 99%      Wt Readings from Last 1 Encounters:   11/27/18 81.6 kg (179 lb 12.8 oz)      Ht Readings from Last 1 Encounters:   11/27/18 1.765 m (5' 9.5\")       Gen: Appears well, no distress,  HEENT: No scleral icterus or hemorrahge, no wet purpura  Ext: no edema  MSK: no abn   Psych : appropriate affect   CN- 2-12 intact , moving all extremities appropriately   Skin - no rashes         LABORATORY DATA:  No new labs were obtained here today.  We reviewed her INR data over the last couple of years         ASSESSMENT AND PLAN:   1.  History of estrogen-provoked left lower extremity deep vein thrombosis and bilateral pulmonary emboli in 2005.   2.  Homozygous for factor V Leiden.       Pt was counseled about consideration for stopping the anticoagulation last time when she saw Dr Garcia and the patient was not ready then, she returns today and we had similar discussion that based on the current guidelines she could stop anticoagulation with the idea that we would do aggressive prophylaxis at the times of risk alternatively as she is quite anxious regarding the risk of recurrence - we could do " prophylactic intensity DOAC - we discussed xarelto vs eliquis and we had a detailed discussion about Pros and cons of all the approaches, availability of reversal agent for Xa inhibitors next year and lack of monitoring as well.     Patient is strongly considering her options and may lean towards switching to DOACS - she will discuss with her  and call the clinic.   Else RTC in 1 year.     Joann Nagy   of Medicine   Hematology and medical Oncology   [facility]

## 2018-12-18 ENCOUNTER — MYC MEDICAL ADVICE (OUTPATIENT)
Dept: FAMILY MEDICINE | Facility: CLINIC | Age: 43
End: 2018-12-18

## 2018-12-20 NOTE — TELEPHONE ENCOUNTER
Patient requested to r/s INR appt. ACC team worked patient into Friday's schedule. Mackenzie Schuler, PETERN, RN

## 2018-12-21 ENCOUNTER — ANTICOAGULATION THERAPY VISIT (OUTPATIENT)
Dept: NURSING | Facility: CLINIC | Age: 43
End: 2018-12-21
Payer: COMMERCIAL

## 2018-12-21 DIAGNOSIS — Z79.01 LONG TERM CURRENT USE OF ANTICOAGULANT THERAPY: ICD-10-CM

## 2018-12-21 DIAGNOSIS — I74.9 EMBOLISM AND THROMBOSIS (H): ICD-10-CM

## 2018-12-21 DIAGNOSIS — I26.99 PULMONARY EMBOLISM AND INFARCTION (H): ICD-10-CM

## 2018-12-21 LAB — INR POINT OF CARE: 2.7 (ref 0.86–1.14)

## 2018-12-21 PROCEDURE — 36416 COLLJ CAPILLARY BLOOD SPEC: CPT

## 2018-12-21 PROCEDURE — 99207 ZZC NO CHARGE NURSE ONLY: CPT

## 2018-12-21 PROCEDURE — 85610 PROTHROMBIN TIME: CPT | Mod: QW

## 2018-12-21 NOTE — PROGRESS NOTES
ANTICOAGULATION FOLLOW-UP CLINIC VISIT    Patient Name:  Zayra Altamirano  Date:  2018  Contact Type:  Face to Face    SUBJECTIVE:     Patient Findings     Positives:   No Problem Findings           OBJECTIVE    INR Protime   Date Value Ref Range Status   2018 2.7 (A) 0.86 - 1.14 Final       ASSESSMENT / PLAN  INR assessment THER    Recheck INR In: 5 WEEKS    INR Location Clinic      Anticoagulation Summary  As of 2018    INR goal:   2.0-3.0   TTR:   98.6 % (2.6 y)   INR used for dosin.7 (2018)   Warfarin maintenance plan:   7.5 mg (7.5 mg x 1) every day   Full warfarin instructions:   7.5 mg every day   Weekly warfarin total:   52.5 mg   No change documented:   Mackenzie Schuler RN   Plan last modified:   Mackenzie Schuler RN (10/26/2018)   Next INR check:   2019   Priority:   INR   Target end date:       Indications    Long term current use of anticoagulant therapy [Z79.01]  Pulmonary embolism and infarction (H) [I26.99]  Embolism and thrombosis (H) [I74.9]  MARNI DEF CLOT FACTOR NEC  Factor V Leiden homozygous- on lovenox 80mg bid [D68.2]             Anticoagulation Episode Summary     INR check location:       Preferred lab:       Send INR reminders to:    [facility]ICOAG [facility]IC    Comments:   3 month Warfarin refills MAX per SB (18). Pt needs to see HEME within 3 months (18). Previous years of noncompliance. Mother recently had stroke d/t poor bridging. Two kids: 12 daughter, son (age?)      Anticoagulation Care Providers     Provider Role Specialty Phone number    Jaquelin Duarte, SWAPNA CNP Responsible Nurse Practitioner [phone]            See the Encounter Report to view Anticoagulation Flowsheet and Dosing Calendar (Go to Encounters tab in chart review, and find the Anticoagulation Therapy Visit)    Patient made aware if signs of clotting (pain, tenderness, swelling, or color change in any extremity) AND/OR bleeding occur (nosebleeds, bleeding  gums, bruising, or blood in stool or urine) to notify provider & seek medical attention. If severe symptoms develop, such as major bleeding, chest pain, shortness of breath, fall, trauma or s/s of stroke, patient to call 911 immediately.       Mackenzie Schuler RN

## 2019-01-02 ENCOUNTER — OFFICE VISIT (OUTPATIENT)
Dept: FAMILY MEDICINE | Facility: CLINIC | Age: 44
End: 2019-01-02
Payer: COMMERCIAL

## 2019-01-02 ENCOUNTER — MYC MEDICAL ADVICE (OUTPATIENT)
Dept: FAMILY MEDICINE | Facility: CLINIC | Age: 44
End: 2019-01-02

## 2019-01-02 VITALS
SYSTOLIC BLOOD PRESSURE: 122 MMHG | OXYGEN SATURATION: 98 % | HEART RATE: 94 BPM | TEMPERATURE: 98 F | BODY MASS INDEX: 25.09 KG/M2 | RESPIRATION RATE: 16 BRPM | DIASTOLIC BLOOD PRESSURE: 86 MMHG | HEIGHT: 70 IN | WEIGHT: 175.25 LBS

## 2019-01-02 DIAGNOSIS — M54.50 ACUTE RIGHT-SIDED LOW BACK PAIN WITHOUT SCIATICA: Primary | ICD-10-CM

## 2019-01-02 DIAGNOSIS — I26.99 OTHER PULMONARY EMBOLISM WITHOUT ACUTE COR PULMONALE, UNSPECIFIED CHRONICITY (H): ICD-10-CM

## 2019-01-02 PROCEDURE — 99214 OFFICE O/P EST MOD 30 MIN: CPT | Performed by: FAMILY MEDICINE

## 2019-01-02 RX ORDER — CYCLOBENZAPRINE HCL 10 MG
5-10 TABLET ORAL 3 TIMES DAILY PRN
Qty: 10 TABLET | Refills: 0 | Status: SHIPPED | OUTPATIENT
Start: 2019-01-02 | End: 2019-02-08

## 2019-01-02 ASSESSMENT — MIFFLIN-ST. JEOR: SCORE: 1525.18

## 2019-01-02 NOTE — PROGRESS NOTES
SUBJECTIVE:   Zayra Altamirano is a 43 year old female who presents to clinic today for the following health issues:      Back Pain       Duration: last Thursday         Specific cause: none    Description:   Location of pain: low back left and middle of back left  Character of pain: dull ache, nagging and intermittent  Pain radiation:none  New numbness or weakness in legs, not attributed to pain:  no     Intensity: Currently 3/10, At its worst 4-5/10    History:   Pain interferes with job: No  History of back problems: no prior back problems  Any previous MRI or X-rays: None  Sees a specialist for back pain:  No  Therapies tried without relief: acetaminophen (Tylenol), heat and massage    Alleviating factors:   Improved by: none      Precipitating factors:  Worsened by: leaning forward and laying in bed     Around a week of symptoms.   Could not find any specific cause. No specific trigger.   Feels like bruise. Does not feel like tight muscle.   When try to locate - specific area - feels bruise but does not feel bumpy or changed that is visible.   Sometime some burning sensation. Comes and goes.   Did not get worse with activity or exercise.   Not going away.   Travelled to [place] but it did not change.     Problem list and histories reviewed & adjusted, as indicated.  Additional history: as documented    Labs reviewed in EPIC    Reviewed and updated as needed this visit by clinical staff       Reviewed and updated as needed this visit by Provider           Social History     Socioeconomic History     Marital status:      Spouse name: Not on file     Number of children: 2     Years of education: Not on file     Highest education level: Not on file   Social Needs     Financial resource strain: Not on file     Food insecurity - worry: Not on file     Food insecurity - inability: Not on file     Transportation needs - medical: Not on file     Transportation needs - non-medical: Not on file    Occupational History     Occupation:       Occupation:     Tobacco Use     Smoking status: Never Smoker     Smokeless tobacco: Never Used   Substance and Sexual Activity     Alcohol use: Yes     Alcohol/week: 0.6 oz     Comment: very occasional. 1 to 2x a month I may have a glass of wine     Drug use: No     Sexual activity: Yes     Partners: Male     Birth control/protection: Male Surgical     Comment:  had vasectomy   Other Topics Concern     Parent/sibling w/ CABG, MI or angioplasty before 65F 55M? No   Social History Narrative    Dairy/d 1-2 servings/d. 3 calcium/mag pills    Caffeine 0-1 servings/d    Exercise 3 week  walks     Sunscreen used - Yes    Seatbelts used - Yes    Working smoke/CO detectors in the home - Yes    Guns stored in the home - No    Self Breast Exams - Yes breastfeeding    Self Testicular Exam - NA    Eye Exam up to date YES    Dental Exam up to date - Yes    Pap Smear up to date - Yes    Mammogram up to date - NA    PSA up to date - NA    Dexa Scan up to date - NA    Flex Sig / Colonoscopy up to date - NA    Immunizations up to date - Yes tetanus vac 2004    Abuse: Current or Past(Physical, Sexual or Emotional)- No    Do you feel safe in your environment - Yes    periods Q 31, last 3-4 days    2/7/06  3/31/09    Emily Hernandez RN     Allergies   Allergen Reactions     Benadryl Allergy Other (See Comments)     Had paradoxical reaction - made her very hyper     Patient Active Problem List   Diagnosis     Embolism and thrombosis (H)     Pulmonary embolism and infarction (H)     MARNI DEF CLOT FACTOR NEC, Factor V Leiden homozygous- on lovenox 80mg bid     History of gestational diabetes     CARDIOVASCULAR SCREENING; LDL GOAL LESS THAN 160     Long term current use of anticoagulant therapy     Ankle pain, left     Ankle pain, right     Homozygous Factor V Leiden mutation (H)     Other pulmonary embolism without acute cor pulmonale, unspecified chronicity (H)     Anxiety about health  "    Reviewed medications, social history and  past medical and surgical history.    Review of system: for general, respiratory, CVS, GI and psychiatry negative except for noted above.     EXAM:  /86 (BP Location: Left arm, Patient Position: Sitting, Cuff Size: Adult Regular)   Pulse 94   Temp 98  F (36.7  C) (Oral)   Resp 16   Ht 1.778 m (5' 10\")   Wt 79.5 kg (175 lb 4 oz)   LMP 12/19/2018   SpO2 98%   BMI 25.15 kg/m    Constitutional: healthy, alert and no distress   Psychiatric: mentation appears normal and affect normal/bright  Skin: See below  Back: No visible skin changes.  No any visible swelling or deformity.  No any specific bony tenderness in the midline.  Back range of motion not restricted.  Right lumbar paraspinal muscle area there is a small localized tender spot upon deep palpation.  No CVA tenderness.    ASSESSMENT / PLAN:  (M54.5) Acute right-sided low back pain without sciatica  (primary encounter diagnosis)  Comment: Unclear etiology.  She does not have a classic symptom of any renal disease.  If her symptoms are persistent we may consider CT scan to rule out any intra-abdominal pathology but I suspect is still most likely musculoskeletal in nature even though she does not have all the classic symptoms.  We discussed about trying a muscle relaxer and see how she does she agreed with it.  Side effects discussed.  She should avoid NSAIDs due to her Coumadin use.  Plan: cyclobenzaprine (FLEXERIL) 10 MG tablet          Other pulmonary embolism without acute cor pulmonale, unspecified chronicity (H)  Comment: Doing well with current dose of Coumadin.  Continue same Rx.    Follow up: If not improving she is going to give me a call and we will order CT scan or sports medicine depending upon her preference.    The above note was dictated using voice recognition. Although reviewed after completion, some word and grammatical error may remain .          "

## 2019-01-20 ENCOUNTER — TRANSFERRED RECORDS (OUTPATIENT)
Dept: HEALTH INFORMATION MANAGEMENT | Facility: CLINIC | Age: 44
End: 2019-01-20

## 2019-01-22 ENCOUNTER — ANTICOAGULATION THERAPY VISIT (OUTPATIENT)
Dept: NURSING | Facility: CLINIC | Age: 44
End: 2019-01-22
Payer: COMMERCIAL

## 2019-01-22 DIAGNOSIS — I26.99 PULMONARY EMBOLISM AND INFARCTION (H): ICD-10-CM

## 2019-01-22 DIAGNOSIS — Z79.01 LONG TERM CURRENT USE OF ANTICOAGULANT THERAPY: ICD-10-CM

## 2019-01-22 DIAGNOSIS — I74.9 EMBOLISM AND THROMBOSIS (H): ICD-10-CM

## 2019-01-22 LAB — INR POINT OF CARE: 2.4 (ref 0.86–1.14)

## 2019-01-22 PROCEDURE — 85610 PROTHROMBIN TIME: CPT | Mod: QW

## 2019-01-22 PROCEDURE — 99207 ZZC NO CHARGE NURSE ONLY: CPT

## 2019-01-22 PROCEDURE — 36416 COLLJ CAPILLARY BLOOD SPEC: CPT

## 2019-01-22 NOTE — PROGRESS NOTES
ANTICOAGULATION FOLLOW-UP CLINIC VISIT    Patient Name:  Zayra Altamirano  Date:  2019  Contact Type:  Face to Face    SUBJECTIVE:     Patient Findings     Positives:   No Problem Findings           OBJECTIVE    INR Protime   Date Value Ref Range Status   2019 2.4 (A) 0.86 - 1.14 Final       ASSESSMENT / PLAN  INR assessment THER    Recheck INR In: 6 WEEKS    INR Location Clinic      Anticoagulation Summary  As of 2019    INR goal:   2.0-3.0   TTR:   98.6 % (2.7 y)   INR used for dosin.4 (2019)   Warfarin maintenance plan:   7.5 mg (7.5 mg x 1) every day   Full warfarin instructions:   7.5 mg every day   Weekly warfarin total:   52.5 mg   No change documented:   Sherly Lees RN   Plan last modified:   Mackenzie Schuler RN (10/26/2018)   Next INR check:   3/5/2019   Priority:   INR   Target end date:       Indications    Long term current use of anticoagulant therapy [Z79.01]  Pulmonary embolism and infarction (H) [I26.99]  Embolism and thrombosis (H) [I74.9]  MARNI DEF CLOT FACTOR NEC  Factor V Leiden homozygous- on lovenox 80mg bid [D68.2]             Anticoagulation Episode Summary     INR check location:       Preferred lab:       Send INR reminders to:    [facility]IC    Comments:   3 month Warfarin refills MAX per SB (18). Pt needs to see HEME within 3 months (18). Previous years of noncompliance. Mother recently had stroke d/t poor bridging. Two kids: 12 daughter, son (age?)      Anticoagulation Care Providers     Provider Role Specialty Phone number    Jaquelin Duarte, SWAPNA CNP Responsible Nurse Practitioner [phone]            See the Encounter Report to view Anticoagulation Flowsheet and Dosing Calendar (Go to Encounters tab in chart review, and find the Anticoagulation Therapy Visit)    Patient aware if signs of clotting (pain, tenderness, swelling, color change in leg or arm, SOB) and bleeding occur (blood in stool, urine, large  bruising, bleeding gums, nosebleeds) to have INR check sooner. If sx severe report to ER or concerned for stroke call 911. If general questions or concerns arise, call clinic.         Sherly Lees RN

## 2019-02-04 ENCOUNTER — MYC MEDICAL ADVICE (OUTPATIENT)
Dept: FAMILY MEDICINE | Facility: CLINIC | Age: 44
End: 2019-02-04

## 2019-02-04 DIAGNOSIS — R10.9 RIGHT FLANK PAIN: Primary | ICD-10-CM

## 2019-02-04 NOTE — TELEPHONE ENCOUNTER
Assessment/Plan from last visit is below.  Not sure what type of CT to pend  Did pend sport's medicine referral though patient asked about the imaging rahter than a referral.  Gail Vera RN    (M54.5) Acute right-sided low back pain without sciatica  (primary encounter diagnosis)  Comment: Unclear etiology.  She does not have a classic symptom of any renal disease.  If her symptoms are persistent we may consider CT scan to rule out any intra-abdominal pathology but I suspect is still most likely musculoskeletal in nature even though she does not have all the classic symptoms.  We discussed about trying a muscle relaxer and see how she does she agreed with it.  Side effects discussed.  She should avoid NSAIDs due to her Coumadin use.  Plan: cyclobenzaprine (FLEXERIL) 10 MG tablet          Other pulmonary embolism without acute cor pulmonale, unspecified chronicity (H)  Comment: Doing well with current dose of Coumadin.  Continue same Rx.     Follow up: If not improving she is going to give me a call and we will order CT scan or sports medicine depending upon her preference.

## 2019-02-06 ENCOUNTER — ANCILLARY PROCEDURE (OUTPATIENT)
Dept: CT IMAGING | Facility: CLINIC | Age: 44
End: 2019-02-06
Attending: FAMILY MEDICINE
Payer: COMMERCIAL

## 2019-02-06 DIAGNOSIS — R10.9 RIGHT FLANK PAIN: ICD-10-CM

## 2019-02-08 ENCOUNTER — OFFICE VISIT (OUTPATIENT)
Dept: FAMILY MEDICINE | Facility: CLINIC | Age: 44
End: 2019-02-08
Payer: COMMERCIAL

## 2019-02-08 VITALS
HEART RATE: 90 BPM | HEIGHT: 70 IN | DIASTOLIC BLOOD PRESSURE: 74 MMHG | TEMPERATURE: 98 F | BODY MASS INDEX: 24.91 KG/M2 | SYSTOLIC BLOOD PRESSURE: 122 MMHG | WEIGHT: 174 LBS | RESPIRATION RATE: 15 BRPM

## 2019-02-08 DIAGNOSIS — R10.9 RIGHT FLANK PAIN: Primary | ICD-10-CM

## 2019-02-08 PROCEDURE — 99213 OFFICE O/P EST LOW 20 MIN: CPT | Performed by: FAMILY MEDICINE

## 2019-02-08 ASSESSMENT — MIFFLIN-ST. JEOR: SCORE: 1519.51

## 2019-02-08 NOTE — PROGRESS NOTES
SUBJECTIVE:   Zayra Altamirano is a 43 year old female who presents to clinic today for the following health issues:    Follow up on CT scan     Still pain in same spot.   Flexeril - did not help much.   Talked to chiropractor - trigger point. May try massage for it.   At night time and sometime with body movement triggers it and sometime no symptoms.        Problem list and histories reviewed & adjusted, as indicated.  Additional history: as documented    Labs reviewed in EPIC    Reviewed and updated as needed this visit by clinical staff  Tobacco  Allergies  Meds       Reviewed and updated as needed this visit by Provider           Social History     Socioeconomic History     Marital status:      Spouse name: Not on file     Number of children: 2     Years of education: Not on file     Highest education level: Not on file   Social Needs     Financial resource strain: Not on file     Food insecurity - worry: Not on file     Food insecurity - inability: Not on file     Transportation needs - medical: Not on file     Transportation needs - non-medical: Not on file   Occupational History     Occupation:       Occupation:     Tobacco Use     Smoking status: Never Smoker     Smokeless tobacco: Never Used   Substance and Sexual Activity     Alcohol use: Yes     Alcohol/week: 0.6 oz     Comment: very occasional. 1 to 2x a month I may have a glass of wine     Drug use: No     Sexual activity: Yes     Partners: Male     Birth control/protection: Male Surgical     Comment:  had vasectomy   Other Topics Concern     Parent/sibling w/ CABG, MI or angioplasty before 65F 55M? No   Social History Narrative    Dairy/d 1-2 servings/d. 3 calcium/mag pills    Caffeine 0-1 servings/d    Exercise 3 week  walks     Sunscreen used - Yes    Seatbelts used - Yes    Working smoke/CO detectors in the home - Yes    Guns stored in the home - No    Self Breast Exams - Yes breastfeeding    Self Testicular Exam - NA  "   Eye Exam up to date YES    Dental Exam up to date - Yes    Pap Smear up to date - Yes    Mammogram up to date - NA    PSA up to date - NA    Dexa Scan up to date - NA    Flex Sig / Colonoscopy up to date - NA    Immunizations up to date - Yes tetanus vac 2004    Abuse: Current or Past(Physical, Sexual or Emotional)- No    Do you feel safe in your environment - Yes    periods Q 31, last 3-4 days    2/7/06  3/31/09    Emily Hernandez RN     Allergies   Allergen Reactions     Benadryl Allergy Other (See Comments)     Had paradoxical reaction - made her very hyper     Patient Active Problem List   Diagnosis     Embolism and thrombosis (H)     Pulmonary embolism and infarction (H)     MARNI DEF CLOT FACTOR NEC, Factor V Leiden homozygous- on lovenox 80mg bid     History of gestational diabetes     CARDIOVASCULAR SCREENING; LDL GOAL LESS THAN 160     Long term current use of anticoagulant therapy     Ankle pain, left     Ankle pain, right     Homozygous Factor V Leiden mutation (H)     Other pulmonary embolism without acute cor pulmonale, unspecified chronicity (H)     Anxiety about health     Reviewed medications, social history and  past medical and surgical history.    Review of system: for general, respiratory, CVS, GI and psychiatry negative except for noted above.     EXAM:  /74   Pulse 90   Temp 98  F (36.7  C) (Oral)   Resp 15   Ht 1.778 m (5' 10\")   Wt 78.9 kg (174 lb)   LMP 01/18/2019   Breastfeeding? No   BMI 24.97 kg/m    Constitutional: healthy, alert and no distress   Psychiatric: mentation appears normal and affect normal/bright       ASSESSMENT / PLAN:  (R10.9) Right flank pain  (primary encounter diagnosis)  Comment: I reviewed her CAT scan in detail.  It showed incidental cholelithiasis without any cholecystitis and incidental small calcification on her right kidney which is unchanged from 2008.  I do not think any of these are culprit for her pain.  Plan: I still suspect most likely is " musculoskeletal in nature and she is planning to do some massage therapy and see how she does.  There is a mild improvement in her symptoms.    Follow up: If not improving and okay to call for sports medicine referral.    The above note was dictated using voice recognition. Although reviewed after completion, some word and grammatical error may remain .

## 2019-02-22 ENCOUNTER — MYC MEDICAL ADVICE (OUTPATIENT)
Dept: HEMATOLOGY | Facility: CLINIC | Age: 44
End: 2019-02-22

## 2019-02-22 NOTE — TELEPHONE ENCOUNTER
RN called patient to discuss switching to prophylactic dose of Xarelto. Pt met with Dr. Nagy in November of 2018 and was given this option at that time. RN was not able to get a hold of the patient, so left message for patient to return call at earliest convenience to talk about reason for switch, medication education and preferred pharmacy for medication.    RN spoke with Zayra, she is switching to Xarelto now that life has settled down and she can deal with new side effects if they emerge. She is looking forward to not monitoring and the easier transition for possible upcoming procedures (none planned at this time). Zayra is aware that she is not to switch over until her INR is less than 3. She will wait 1 day after stopping her coumadin to start her Xarelto 10 mg. Plan was reviewed by FRANK Gutiérrez. Script was put into the [facility] pharmacy.       Martha Simeon RN - Nurse Clinician - [facility] - [phone]

## 2019-02-25 DIAGNOSIS — Z86.718 HISTORY OF VENOUS THROMBOEMBOLISM: Primary | ICD-10-CM

## 2019-02-25 DIAGNOSIS — Z79.01 CHRONIC ANTICOAGULATION: ICD-10-CM

## 2019-02-25 NOTE — PROGRESS NOTES
[facility]  [address] [place] [address]  Main: [phone], Fax: [phone]    Documentation Note:    Patient: Zayra Altamirano  MRN: [id number]  : 1975  Date of this note written: 2019    Patient called our center and speak with Martha Simeon, nurse clinician of this clinic and requesting switching her anticoagulation therapy from Warfarin to NOAC's. This option was discussed in details during her visit with Dr. Nagy back in 2018. This writer have reviewed Dr. Nagy's note on 2018.     Approval of switching to Xarelto at 10 mg PO Qday given. Prescription is sent to patient's preferred pharmacy.    She will need an INR test prior to switching over to Xarelto. If her INR is <3.0, she can stop her Warfarin and start her Xarelto the next day. If her INR is >3.0, she should hold her Warfarin for 2 days before starting Xarelto.     nurse Karl clinician will contact the patient to communicate the above plan.       Miky Hanson PA-C, MPAS  Physician Assistant  [facility].

## 2019-03-13 ENCOUNTER — HOSPITAL ENCOUNTER (OUTPATIENT)
Dept: MAMMOGRAPHY | Facility: CLINIC | Age: 44
Discharge: HOME | End: 2019-03-13
Attending: NURSE PRACTITIONER | Admitting: NURSE PRACTITIONER
Payer: COMMERCIAL

## 2019-03-13 DIAGNOSIS — Z12.31 VISIT FOR SCREENING MAMMOGRAM: ICD-10-CM

## 2019-03-13 PROCEDURE — 77063 BREAST TOMOSYNTHESIS BI: CPT

## 2019-05-31 ENCOUNTER — OFFICE VISIT (OUTPATIENT)
Dept: FAMILY MEDICINE | Facility: CLINIC | Age: 44
End: 2019-05-31
Payer: COMMERCIAL

## 2019-05-31 VITALS
DIASTOLIC BLOOD PRESSURE: 74 MMHG | TEMPERATURE: 97.5 F | HEART RATE: 96 BPM | WEIGHT: 170 LBS | SYSTOLIC BLOOD PRESSURE: 117 MMHG | BODY MASS INDEX: 24.39 KG/M2 | RESPIRATION RATE: 16 BRPM

## 2019-05-31 DIAGNOSIS — L72.9 FOLLICULAR CYST OF SKIN: Primary | ICD-10-CM

## 2019-05-31 PROCEDURE — 99213 OFFICE O/P EST LOW 20 MIN: CPT | Performed by: NURSE PRACTITIONER

## 2019-05-31 NOTE — PROGRESS NOTES
"Subjective     Zayra Altamirano is a 43 year old female who presents to clinic today for the following health issues:    HPI     Cyst       Duration: over the weekend     Description (location/character/radiation): in side of her vagina     Intensity: mild    Accompanying signs and symptoms: none    History (similar episodes/previous evaluation): None    Precipitating or alleviating factors: None    Therapies tried and outcome: None     Small  BB sized lump on the side of inner fold of her vagina.      Reviewed and updated as needed this visit by Provider  Tobacco  Allergies  Meds  Problems  Med Hx  Surg Hx  Fam Hx         Review of Systems   ROS COMP: Constitutional, HEENT, cardiovascular, pulmonary, gi and gu systems are negative, except as otherwise noted.      Objective    /74   Pulse 96   Temp 97.5  F (36.4  C) (Oral)   Resp 16   Wt 77.1 kg (170 lb)   BMI 24.39 kg/m    Body mass index is 24.39 kg/m .  Physical Exam   GENERAL: healthy, alert and no distress  RESP: lungs clear to auscultation - no rales, rhonchi or wheezes  CV: regular rate and rhythm, normal S1 S2, no S3 or S4, no murmur, click or rub, no peripheral edema and peripheral pulses strong   (female): small 2mm flesh toned cyst on right lateral labia minora.  normal female external genitalia, normal urethral meatus, vaginal mucosa without masses or discharge  MS: no gross musculoskeletal defects noted, no edema  SKIN: no suspicious lesions or rashes          Assessment & Plan       ICD-10-CM    1. Follicular cyst of skin L72.9       warm sitz baths.   Gentle massage or loofah sponge to area.    wipe away any discharge or drainage.  Advised to f/u with GYN if persists or worsens.      BMI:   Estimated body mass index is 24.39 kg/m  as calculated from the following:    Height as of 2/8/19: 1.778 m (5' 10\").    Weight as of this encounter: 77.1 kg (170 lb).     Return in about 1 week (around 6/7/2019), or if symptoms " worsen or fail to improve.    SWAPNA Santana CNP  [facility]

## 2019-10-08 ENCOUNTER — TELEPHONE (OUTPATIENT)
Dept: OTHER | Facility: CLINIC | Age: 44
End: 2019-10-08

## 2019-10-08 ENCOUNTER — OFFICE VISIT (OUTPATIENT)
Dept: FAMILY MEDICINE | Facility: CLINIC | Age: 44
End: 2019-10-08
Payer: COMMERCIAL

## 2019-10-08 VITALS
BODY MASS INDEX: 25.34 KG/M2 | HEIGHT: 70 IN | OXYGEN SATURATION: 99 % | SYSTOLIC BLOOD PRESSURE: 136 MMHG | RESPIRATION RATE: 16 BRPM | TEMPERATURE: 98.3 F | HEART RATE: 78 BPM | DIASTOLIC BLOOD PRESSURE: 81 MMHG | WEIGHT: 177 LBS

## 2019-10-08 DIAGNOSIS — Z00.00 WELL ADULT HEALTH CHECK: Primary | ICD-10-CM

## 2019-10-08 PROCEDURE — 36415 COLL VENOUS BLD VENIPUNCTURE: CPT | Performed by: NURSE PRACTITIONER

## 2019-10-08 PROCEDURE — 82947 ASSAY GLUCOSE BLOOD QUANT: CPT | Performed by: NURSE PRACTITIONER

## 2019-10-08 PROCEDURE — 80061 LIPID PANEL: CPT | Performed by: NURSE PRACTITIONER

## 2019-10-08 PROCEDURE — 99396 PREV VISIT EST AGE 40-64: CPT | Performed by: NURSE PRACTITIONER

## 2019-10-08 ASSESSMENT — ENCOUNTER SYMPTOMS
SHORTNESS OF BREATH: 0
CONSTIPATION: 0
HEMATOCHEZIA: 0
DIARRHEA: 0
WEAKNESS: 0
HEMATURIA: 0
COUGH: 0
CHILLS: 0
HEADACHES: 0
FREQUENCY: 0
MYALGIAS: 0
PARESTHESIAS: 0
ARTHRALGIAS: 0
DIZZINESS: 0
HEARTBURN: 0
EYE PAIN: 0
BREAST MASS: 0
NAUSEA: 0
JOINT SWELLING: 0
DYSURIA: 0
ABDOMINAL PAIN: 0
SORE THROAT: 0
NERVOUS/ANXIOUS: 0
FEVER: 0
PALPITATIONS: 0

## 2019-10-08 ASSESSMENT — MIFFLIN-ST. JEOR: SCORE: 1520.18

## 2019-10-08 NOTE — PROGRESS NOTES
Answers for HPI/ROS submitted by the patient on 10/8/2019   Annual Exam:  Frequency of exercise:: 4-5 days/week  Getting at least 3 servings of Calcium per day:: Yes  Diet:: Regular (no restrictions)  Taking medications regularly:: Yes  Medication side effects:: None  Bi-annual eye exam:: Yes  Dental care twice a year:: Yes  Sleep apnea or symptoms of sleep apnea:: None  abdominal pain: No  Blood in stool: No  Blood in urine: No  chest pain: No  chills: No  congestion: No  constipation: No  cough: No  diarrhea: No  dizziness: No  ear pain: No  eye pain: No  nervous/anxious: No  fever: No  frequency: No  genital sores: No  headaches: No  hearing loss: No  heartburn: No  arthralgias: No  joint swelling: No  peripheral edema: No  mood changes: No  myalgias: No  nausea: No  dysuria: No  palpitations: No  Skin sensation changes: No  sore throat: No  urgency: No  rash: No  shortness of breath: No  visual disturbance: No  weakness: No  pelvic pain: No  vaginal bleeding: No  vaginal discharge: No  tenderness: No  breast mass: No  breast discharge: No  Additional concerns today:: Yes  Duration of exercise:: 15-30 minutes

## 2019-10-08 NOTE — TELEPHONE ENCOUNTER
"Patient left voice message stating she is a former patient of Dr. Coreas's from 2015 -    She is having a sharp pain in the legs that comes and goes - sometimes bilateral, mid-thigh,sometimes the back of thigh, sometimes inside,  sometimes it is \"here and there\".    No swelling, no redness,  Pain is not similar to achy pain in 2015 with her DVT.  This pain is sharp/burning/pinchy.  Notices it more at night than during the day.    Takes 10 mg Xarelto daily.    At her wellness check today, her NP told her she should probably have this checked by someone with expertise in vascular medicine.    Routing to Dr. Coreas to advise.      "

## 2019-10-08 NOTE — PROGRESS NOTES
SUBJECTIVE:   CC: Zayra Altamirano is an 44 year old woman who presents for preventive health visit.     Healthy Habits:     Getting at least 3 servings of Calcium per day:  Yes    Bi-annual eye exam:  Yes    Dental care twice a year:  Yes    Sleep apnea or symptoms of sleep apnea:  None    Diet:  Regular (no restrictions)    Frequency of exercise:  4-5 days/week    Duration of exercise:  15-30 minutes    Taking medications regularly:  Yes    Medication side effects:  None    PHQ-2 Total Score: 0    Additional concerns today:  Yes    Today's PHQ-2 Score:   PHQ-2 ( 1999 [employer]) 10/8/2019   Q1: Little interest or pleasure in doing things 0   Q2: Feeling down, depressed or hopeless 0   PHQ-2 Score 0   Q1: Little interest or pleasure in doing things Not at all   Q2: Feeling down, depressed or hopeless Not at all   PHQ-2 Score 0       Abuse: Current or Past(Physical, Sexual or Emotional)- No  Do you feel safe in your environment? Yes    Social History     Tobacco Use     Smoking status: Never Smoker     Smokeless tobacco: Never Used   Substance Use Topics     Alcohol use: Yes     Alcohol/week: 1.0 standard drinks     Comment: very occasional. 1 to 2x a month I may have a glass of wine     If you drink alcohol do you typically have >3 drinks per day or >7 drinks per week? No    Alcohol Use 10/8/2019   Prescreen: >3 drinks/day or >7 drinks/week? No   Prescreen: >3 drinks/day or >7 drinks/week? -     Reviewed orders with patient.  Reviewed health maintenance and updated orders accordingly - Yes      Pertinent mammograms are reviewed under the imaging tab.  History of abnormal Pap smear: NO - age 30-65 PAP every 5 years with negative HPV co-testing recommended  PAP / HPV Latest Ref Rng & Units 4/19/2017 3/20/2014 5/20/2013   PAP - NIL NIL NIL   HPV 16 DNA NEG Negative - -   HPV 18 DNA NEG Negative - -   OTHER HR HPV NEG Negative - -     Reviewed and updated as needed this visit by clinical staff  Tobacco   "Allergies  Meds  Problems  Med Hx  Surg Hx  Fam Hx  Soc Hx          Reviewed and updated as needed this visit by Provider  Tobacco  Allergies  Meds  Problems  Med Hx  Surg Hx  Fam Hx          Review of Systems   Constitutional: Negative for chills and fever.   HENT: Negative for congestion, ear pain, hearing loss and sore throat.    Eyes: Negative for pain and visual disturbance.   Respiratory: Negative for cough and shortness of breath.    Cardiovascular: Negative for chest pain, palpitations and peripheral edema.   Gastrointestinal: Negative for abdominal pain, constipation, diarrhea, heartburn, hematochezia and nausea.   Breasts:  Negative for tenderness, breast mass and discharge.   Genitourinary: Negative for dysuria, frequency, genital sores, hematuria, pelvic pain, urgency, vaginal bleeding and vaginal discharge.   Musculoskeletal: Negative for arthralgias, joint swelling and myalgias.   Skin: Negative for rash.   Neurological: Negative for dizziness, weakness, headaches and paresthesias.   Psychiatric/Behavioral: Negative for mood changes. The patient is not nervous/anxious.         OBJECTIVE:   /81   Pulse 78   Temp 98.3  F (36.8  C)   Resp 16   Ht 1.765 m (5' 9.5\")   Wt 80.3 kg (177 lb)   LMP 09/19/2019   SpO2 99%   BMI 25.76 kg/m    Physical Exam  GENERAL: healthy, alert and no distress  EYES: Eyes grossly normal to inspection, PERRL and conjunctivae and sclerae normal  HENT: ear canals and TM's normal, nose and mouth without ulcers or lesions  NECK: no adenopathy, no asymmetry, masses, or scars and thyroid normal to palpation  RESP: lungs clear to auscultation - no rales, rhonchi or wheezes  BREAST: normal without masses, tenderness or nipple discharge and no palpable axillary masses or adenopathy  CV: regular rate and rhythm, normal S1 S2, no S3 or S4, no murmur, click or rub, no peripheral edema and peripheral pulses strong  ABDOMEN: soft, nontender, no hepatosplenomegaly, no " "masses and bowel sounds normal  MS: no gross musculoskeletal defects noted, no edema  SKIN: no suspicious lesions or rashes  NEURO: Normal strength and tone, mentation intact and speech normal  PSYCH: mentation appears normal, affect normal/bright      ASSESSMENT/PLAN:       ICD-10-CM    1. Well adult health check Z00.00 Lipid panel reflex to direct LDL Non-fasting     Glucose      well female, not fasting for labs.  Encouraged to continue exercise and healthy diet choices.        COUNSELING:  Reviewed preventive health counseling, as reflected in patient instructions    Estimated body mass index is 25.76 kg/m  as calculated from the following:    Height as of this encounter: 1.765 m (5' 9.5\").    Weight as of this encounter: 80.3 kg (177 lb).     reports that she has never smoked. She has never used smokeless tobacco.    Counseling Resources:  ATP IV Guidelines  Pooled Cohorts Equation Calculator  Breast Cancer Risk Calculator  FRAX Risk Assessment  ICSI Preventive Guidelines  Dietary Guidelines for Americans, 2010  USDA's MyPlate  ASA Prophylaxis  Lung CA Screening    SWAPNA Santana CNP  [facility]  "

## 2019-10-09 LAB
CHOLEST SERPL-MCNC: 206 MG/DL
GLUCOSE SERPL-MCNC: 98 MG/DL (ref 70–99)
HDLC SERPL-MCNC: 55 MG/DL
LDLC SERPL CALC-MCNC: 135 MG/DL
NONHDLC SERPL-MCNC: 151 MG/DL
TRIGL SERPL-MCNC: 82 MG/DL

## 2019-10-09 NOTE — TELEPHONE ENCOUNTER
The patient should be seen by me first. She will need to have imaging of some sort done. I will determine at the visit if she should get arterial or venous imaging undertaken. I am now not going out of town on 10-17 and 10-18 so she can scheduled at any regular clinic time on 10/17 or 10/18.

## 2019-10-09 NOTE — TELEPHONE ENCOUNTER
Patient will be out of town the 17th and 18th,  Is now scheduled to see Dr. Coreas on 10/30/19 at 11:40.

## 2019-10-09 NOTE — TELEPHONE ENCOUNTER
Called patient, left voice message asking her to return call to schedule 30 minute appointment on October 17th or 18th.

## 2019-10-30 ENCOUNTER — OFFICE VISIT (OUTPATIENT)
Dept: OTHER | Facility: CLINIC | Age: 44
End: 2019-10-30
Attending: INTERNAL MEDICINE
Payer: COMMERCIAL

## 2019-10-30 DIAGNOSIS — I83.813 VARICOSE VEINS OF BOTH LOWER EXTREMITIES WITH PAIN: Primary | ICD-10-CM

## 2019-10-30 PROCEDURE — G0463 HOSPITAL OUTPT CLINIC VISIT: HCPCS

## 2019-10-30 PROCEDURE — 99204 OFFICE O/P NEW MOD 45 MIN: CPT | Mod: ZP | Performed by: INTERNAL MEDICINE

## 2019-10-30 NOTE — PROGRESS NOTES
Zayra Altamirano is a 44 year old female who is presenting at the current time to discuss her diagnosi(es) of     Varicose veins of both lower extremities with pain .    HPI: The ptient is a very physically healthy 39-year-old white female who is a factor V Leiden homozygote.  She originally presented in 2005 with, per her account, anatomically extensive pulmonary emboli which originated from a left lower extremity DVT.  Per the patient's account, the DVT was originally documented to have been only in the infrageniculate position.  However, we have no idea whether or not she had significant DVT which embolized off of a more proximal location from her left lower extremity.  Her risk factors for DVT originally were felt to be NuvaRing hormonal contraception combined with frequent car trips to [place].  As the anatomical extent of her venous thromboembolic presentation seemed to exceed provocative factors, that is why she was seen by hematologist who performed a thrombophilia evaluation documenting the presence of factor V Leiden homozygosity.  She has been counseled to undergo lifelong anticoagulation and has been maintained with high time in therapeutic range on oral warfarin with an INR goal between 2 and 3. In the recent past, she switched to Xarelto 10 mg daily form warfarin. She presents for evaluation today because of an annoying discomfort  she developed in the last several months without edema in her bilateral medial thighs. She is seeking clarification as to the etiology of this. She has no large varicosities either.           HPI:    Review Of Systems  Skin: negative  Eyes: negative  Ears/Nose/Throat: negative  Respiratory: No shortness of breath, dyspnea on exertion, cough, or hemoptysis  Cardiovascular: negative for chest pain, positive for pain in her bilateral inner thighs without edema or congestion  Gastrointestinal: negative  Genitourinary: negative  Musculoskeletal:  negative  Neurologic: negative  Psychiatric: negative  Hematologic/Lymphatic/Immunologic: negative  Endocrine: negative       PAST MEDICAL HISTORY:                  Past Medical History:   Diagnosis Date     Congenital deficiency of other clotting factors     Factor 5 Leiden homozygous mutation     DVT of leg (deep venous thrombosis) (H) 2005     Embolism and thrombosis of unspecified site     left leg that traveled to become PE     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  change goal INR 1.3-2.0     Rosacea        PAST SURGICAL HISTORY:                  Past Surgical History:   Procedure Laterality Date     C EVENT MONITOR (CARDIAC - [place])  10/08    Normal/had palpatiations.      C/SECTION, LOW TRANSVERSE  2008    , Low Transverse      SECTION  2012    Procedure: SECTION; Surgeon:ELY KRAMER; Location:[facility] L+[facility]      COLPOSCOPY VULVA W BIOPSY         CURRENT MEDICATIONS:                  Current Outpatient Medications   Medication Sig Dispense Refill     Acetaminophen (TYLENOL PO) Take 500 mg by mouth       rivaroxaban ANTICOAGULANT (XARELTO) 10 MG TABS tablet Take 1 tablet (10 mg) by mouth daily (with dinner) 90 tablet 2     VITAMIN D, CHOLECALCIFEROL, PO Take 1,000 Units by mouth daily         ALLERGIES:                  Allergies   Allergen Reactions     Benadryl Allergy Other (See Comments)     Had paradoxical reaction - made her very hyper       SOCIAL HISTORY:                  Social History     Socioeconomic History     Marital status:      Spouse name: Not on file     Number of  children: 2     Years of education: Not on file     Highest education level: Not on file   Occupational History     Occupation:       Occupation:     Social Needs     Financial resource strain: Not on file     Food insecurity:     Worry: Not on file     Inability: Not on file     Transportation needs:     Medical: Not on file     Non-medical: Not on file   Tobacco Use     Smoking status: Never Smoker     Smokeless tobacco: Never Used   Substance and Sexual Activity     Alcohol use: Yes     Alcohol/week: 1.0 standard drinks     Comment: very occasional. 1 to 2x a month I may have a glass of wine     Drug use: No     Sexual activity: Yes     Partners: Male     Birth control/protection: Male Surgical     Comment:  had vasectomy   Lifestyle     Physical activity:     Days per week: Not on file     Minutes per session: Not on file     Stress: Not on file   Relationships     Social connections:     Talks on phone: Not on file     Gets together: Not on file     Attends Islam service: Not on file     Active member of club or organization: Not on file     Attends meetings of clubs or organizations: Not on file     Relationship status: Not on file     Intimate partner violence:     Fear of current or ex partner: Not on file     Emotionally abused: Not on file     Physically abused: Not on file     Forced sexual activity: Not on file   Other Topics Concern     Parent/sibling w/ CABG, MI or angioplasty before 65F 55M? No   Social History Narrative    Dairy/d 1-2 servings/d. 3 calcium/mag pills    Caffeine 0-1 servings/d    Exercise 3 week  walks     Sunscreen used - Yes    Seatbelts used - Yes    Working smoke/CO detectors in the home - Yes    Guns stored in the home - No    Self Breast Exams - Yes breastfeeding    Self Testicular Exam - NA    Eye Exam up to date YES    Dental Exam up to date - Yes    Pap Smear up to date - Yes    Mammogram up to date - NA    PSA up to date - NA    Dexa Scan up to date - NA    Flex  Sig / Colonoscopy up to date - NA    Immunizations up to date - Yes tetanus vac 2004    Abuse: Current or Past(Physical, Sexual or Emotional)- No    Do you feel safe in your environment - Yes    periods Q 31, last 3-4 days    2/7/06  3/31/09    Emily Hernadnez RN       FAMILY HISTORY:                   Family History   Problem Relation Age of Onset     Lipids Father      Hypertension Father      Circulatory Father         veins stripped      Unknown/Adopted Father         hyperlipidemia      Cataracts Father      Blood Disease Sister         FACTOR 5 LEIDEN      Blood Disease Sister         FACTOR 5     Breast Cancer Maternal Grandmother      Diabetes Maternal Grandmother         Type 2     Cerebrovascular Disease Maternal Grandmother      Cardiovascular Maternal Grandmother         MI     Hypertension Maternal Grandmother      Cancer Maternal Grandfather         liver cancer      Breast Cancer Mother 63        IDC 2012, recurrent 2018.     Hypertension Mother      Cerebrovascular Disease Mother         Ischemic stroke 3/18 at age 68.     Blood Disease Brother         FACTOR 5 LEIDEN HOMOZYGOUS         Physical exam Reveals:    O/P: WNL  HEENT: WNL  NECK: No JVD, thyromegaly, or lymphadenopathy  HEART: RRR, no murmurs, gallops, or rubs  LUNGS: CTA bilaterally without rales, wheezes, or rhonchi  GI: NABS, nondistended, nontender, soft  EXT:without cyanosis, clubbing, or edema; CEAP C1 varicosities. No leg asymmetry.   NEURO: nonfocal  : no flank tenderness      A/P:    (I83.813) CEAP C1 varicose veins of both lower extremities with pain  (primary encounter diagnosis)  Comment: these are minimal. I advised she wear compression hosiery if she would like something done initially, but she feels the discomfor tis not sufficient enough to warrant the above. She will forego any interventions at present. I advised that no imaging be undertaken as her exam is not consistent with compromised venous flow return other than  the aforementioned small varicosities.   Plan: RTC prn.       Greater than one half the fortyfive minutes total spent on the pt's visit were spent providing education and counselling to the patient regarding the above matters.

## 2020-02-03 DIAGNOSIS — Z86.718 HISTORY OF VENOUS THROMBOEMBOLISM: ICD-10-CM

## 2020-02-03 DIAGNOSIS — Z79.01 CHRONIC ANTICOAGULATION: ICD-10-CM

## 2020-02-03 RX ORDER — RIVAROXABAN 10 MG/1
TABLET, FILM COATED ORAL
Qty: 90 TABLET | Refills: 0 | Status: SHIPPED | OUTPATIENT
Start: 2020-02-03 | End: 2020-03-23

## 2020-02-11 ENCOUNTER — OFFICE VISIT (OUTPATIENT)
Dept: HEMATOLOGY | Facility: CLINIC | Age: 45
End: 2020-02-11
Attending: PHYSICIAN ASSISTANT
Payer: COMMERCIAL

## 2020-02-11 VITALS
HEIGHT: 70 IN | TEMPERATURE: 97.8 F | BODY MASS INDEX: 26.37 KG/M2 | DIASTOLIC BLOOD PRESSURE: 89 MMHG | RESPIRATION RATE: 12 BRPM | SYSTOLIC BLOOD PRESSURE: 144 MMHG | WEIGHT: 184.2 LBS | HEART RATE: 76 BPM | OXYGEN SATURATION: 97 %

## 2020-02-11 DIAGNOSIS — Z79.01 CHRONIC ANTICOAGULATION: Primary | ICD-10-CM

## 2020-02-11 DIAGNOSIS — D68.51 HOMOZYGOUS FACTOR V LEIDEN MUTATION (H): ICD-10-CM

## 2020-02-11 DIAGNOSIS — Z86.718 HISTORY OF VENOUS THROMBOEMBOLISM: ICD-10-CM

## 2020-02-11 LAB
ALBUMIN SERPL-MCNC: 4.1 G/DL (ref 3.4–5)
ALP SERPL-CCNC: 51 U/L (ref 40–150)
ALT SERPL W P-5'-P-CCNC: 63 U/L (ref 0–50)
ANION GAP SERPL CALCULATED.3IONS-SCNC: 5 MMOL/L (ref 3–14)
AST SERPL W P-5'-P-CCNC: 32 U/L (ref 0–45)
BILIRUB SERPL-MCNC: 1.1 MG/DL (ref 0.2–1.3)
BUN SERPL-MCNC: 9 MG/DL (ref 7–30)
CALCIUM SERPL-MCNC: 8.8 MG/DL (ref 8.5–10.1)
CHLORIDE SERPL-SCNC: 104 MMOL/L (ref 94–109)
CO2 SERPL-SCNC: 30 MMOL/L (ref 20–32)
CREAT SERPL-MCNC: 0.78 MG/DL (ref 0.52–1.04)
ERYTHROCYTE [DISTWIDTH] IN BLOOD BY AUTOMATED COUNT: 12.8 % (ref 10–15)
GFR SERPL CREATININE-BSD FRML MDRD: >90 ML/MIN/{1.73_M2}
GLUCOSE SERPL-MCNC: 85 MG/DL (ref 70–99)
HCT VFR BLD AUTO: 41 % (ref 35–47)
HGB BLD-MCNC: 14 G/DL (ref 11.7–15.7)
MCH RBC QN AUTO: 30 PG (ref 26.5–33)
MCHC RBC AUTO-ENTMCNC: 34.1 G/DL (ref 31.5–36.5)
MCV RBC AUTO: 88 FL (ref 78–100)
PLATELET # BLD AUTO: 276 10E9/L (ref 150–450)
POTASSIUM SERPL-SCNC: 3.9 MMOL/L (ref 3.4–5.3)
PROT SERPL-MCNC: 7.3 G/DL (ref 6.8–8.8)
RBC # BLD AUTO: 4.67 10E12/L (ref 3.8–5.2)
SODIUM SERPL-SCNC: 139 MMOL/L (ref 133–144)
WBC # BLD AUTO: 7.4 10E9/L (ref 4–11)

## 2020-02-11 PROCEDURE — 36415 COLL VENOUS BLD VENIPUNCTURE: CPT

## 2020-02-11 PROCEDURE — 99213 OFFICE O/P EST LOW 20 MIN: CPT | Performed by: PHYSICIAN ASSISTANT

## 2020-02-11 PROCEDURE — 85027 COMPLETE CBC AUTOMATED: CPT | Performed by: PHYSICIAN ASSISTANT

## 2020-02-11 PROCEDURE — 80053 COMPREHEN METABOLIC PANEL: CPT | Performed by: PHYSICIAN ASSISTANT

## 2020-02-11 PROCEDURE — G0463 HOSPITAL OUTPT CLINIC VISIT: HCPCS

## 2020-02-11 ASSESSMENT — PAIN SCALES - GENERAL: PAINLEVEL: NO PAIN (0)

## 2020-02-11 ASSESSMENT — MIFFLIN-ST. JEOR: SCORE: 1560.78

## 2020-02-11 NOTE — PATIENT INSTRUCTIONS
1. Continue Xarelto 10 mg every 24 hours.  2. Return to clinic annually and as needed.      [facility]  [facility]  [address], [place] [address]  Main: [phone], Fax: [phone]    It was a pleasure seeing you today.  Thank you for allowing us to be involved in your care.  YOU are the reason we are here, and truly hope we provided you with the excellent service you deserve.  Please let us know if there is anything else we can do for you, so that we can be sure you are leaving completely satisfied with your care experience.    Labs today.  Our lab is down the hallway in our clinic space.  The newer anticoagulants are cleared through your kidney and liver, so we may check these annually if not done elsewhere.    Please stay on your blood thinner Xarelto  Please call us with any bleeding issues that you may have.  Wear compression stockings if you have swelling in your leg. Take them off while you are sleeping. You may need to get new stockings every 3-6 months with regular wear.    Call the [facility]  at [phone].     -If surgeries or procedures are planned (for holding instructions).     -If off anticoagulation, please call during high risk times (long-distance travel, broken bones or trauma, immobilization, surgery, pregnancy, or taking estrogen).     -Any new symptoms of DVT (deep vein thrombosis) or PE (pulmonary embolism)    -pain     -swelling     -redness    -warmth    -shortness of breath    -chest pain    -coughing up blood    We would like a provider on our team to see you at least annually for optimal care and to allow us to continue to prescribe for you.  Miky Hanson PA-C and Tracey Berrios PA-C are our physician assistants that are specialized in bleeding and clotting disorders that you may be able to see more readily.    Return to clinic in  one year.  Please schedule return appointment with  Miky Hanson PA-C .    Your nurse clinician is Lorraine Contreras -[phone] .   If they are unavailable and you have immediate concerns, please call [phone] and ask for a nurse.

## 2020-02-11 NOTE — PROGRESS NOTES
[facility]  [address] [place] [id number]  Main: [phone], Fax: [phone]    Patient seen at: [facility] at [address] [place] [address]    Outpatient Visit Note:    Patient: Zayra Altamirano  MRN: [id number]  : 1975  TIMBO: 2020    Reason:  History of estrogen provoked venous thromboembolism. Homozygous factor V Leiden mutation. Chronic anticoagulation therapy. Here for follow up visit.     Clinical History Summary:  Zayra is a 44 year old female with a history of estrogen provoked venous thromboembolism who is found to have homozygous factor V Leiden mutation, currently on chronic anticoagulation therapy with Xarelto at 10 mg PO Qday dosing, here for her routine yearly follow up clinic visit.     Her venous thromboembolic (VTE) event occurred back in Dec 2005. She apparently was on NuvaRing for about one year when she presented in Dec 2005 with a left lower extremity DVT and bilateral pulmonary embolism. She eventually was found to have homozygous factor V Leiden mutation which became the basis for her staying on long term anticoagulation therapy. She initially was on Coumadin for many years. Then back on 2018, she had a visit with Dr. Joann Nagy, staff hematologist here at this clinic, who discussed with the patient about potentially switching to a DOAC. Eventually the patient had decided to transition to Xarelto at 10 mg PO Qday dosing as of 2019.     Zayra reports that she has been doing well with Xarelto at 10 mg PO Qday dosing. She has no issues with bleeding complications or any recurrent venous thromboembolic events.     Interim History:  She reports no issues with bleeding complications. She did have some varicose veins pain of the lower extremity back at the end of  for which she was seen by vascular service, Dr. Coreas who recommended continuation of compression stockings  "use. Since then, Zayra reports that her pain with varicose veins has resolved. Today she denies any lower extremity swelling or pain. She denies any shortness of breath. No chest pain. Denies any epistaxis. No issues with oral mucosal bleeding. Denies any issues with hematuria or blood in stools.     ROS:  As above.    Medication, Allergies and PmHx:  All have been reviewed by this writer in the electronic medical records.    Social History:  Deferred.    Family History:  Mother has heterozygous factor V Leiden mutation and a history of pulmonary embolism associated with breast cancer chemotherapy. She is currently on Xarelto at 20 mg Qday. Her paternal grandmother had a history of 2 DVT with one occurred in her 40s and the other in her 80s, reportedly she also was tested positive for heterozygous factor V Leiden mutation. Zayra's brother also has homozygous factor V Leiden mutation but with no history of DVT/PE.     Objective:  Pleasant in no acute distress.  Vitals: BP (!) 144/89 (BP Location: Right arm, Patient Position: Sitting, Cuff Size: Adult Regular)   Pulse 76   Temp 97.8  F (36.6  C) (Oral)   Resp 12   Ht 1.778 m (5' 10\")   Wt 83.6 kg (184 lb 3.2 oz)   SpO2 97%   BMI 26.43 kg/m    Exam:  Complete exam is not performed today.     Assessment:  In summary, Zayra is a 44 year old female with a history of estrogen provoked pulmonary embolism back in 2005, who is also found to have homozygous factor V Leiden mutation, currently on long term anticoagulation therapy with Xarelto at 10 mg PO Qday, returns to clinic today for her annual routine follow up visit with this clinic. Zayra has done well since she switched from Coumadin to Xarelto back in Feb 2019. She has no issues with bleeding or any recurrent venous thromboembolic events. She is very content and happy with the convenience that Xarelto has provided her.    Diagnosis:  1. History of estrogen provoked pulmonary embolism and lower extremity DVT back " in 2005.  2. Homozygous factor V Leiden Mutation.  3. Chronic anticoagulation therapy.     Plan:  Zayra remains to be a good candidate to stay on long term anticoagulation therapy with Xarelto at current dosing of 10 mg PO Qday. She has no issues financially to obtain the medication. She has no bleeding complications or any recurrent venous thromboembolic events.    She has some questions about Xarelto in regard to 20 mg tablets vs 10 mg tablets as her mother currently is also on Xarelto at the 20 mg Qday dosing regimen. I explain to Zayra about the study that lead to the FDA approval for prophylactic dosing regimen of Xarelto at 10 mg PO Qday. I also explain to Zayra that her mother's case is different in a sense that she also has the diagnosis of cancer associated venous thromboembolic event.     Additionally, she inquires about her two children. Both of her children (assuming that their father is negative for factor V Leiden mutation), they are both heterozygous for the mutation. Her daughter is 11 years of age and according to Zayra that she is not currently needing to discuss use of birth control. However, Zayra inquires about it for future consideration. I explain to Zayra that in general, we do recommend against the use of estrogen based products for women with known factor V Leiden mutation. I explain that we generally would recommend progesterone only containing products or IUD, as these contraceptive methods do not increase risk for venous thromboembolism.     Finally, I will check complete metabolic panel and CBC today. We do recommend to monitor her LFT's, renal function and CBC at least once yearly while she is on Xarelto or any other direct oral anticoagulant agents.     The patient is instructed to call our center if she should need any invasive or surgical procedures in the future. At which time, our clinic will provide her with anticoagulation management plan for these procedures.     Lorraine Harrison,  nurse clinician is present for part of today's clinic visit.    Total Time Spent:   18 minutes, all 18 minutes was spent on face-to-face consultation with the patient and coordination of care in regard to her history of pulmonary embolism and DVT, as well as chronic anticoagulation therapy management.     Time IN: 14:06  Time OUT: 14:24      Miky Hanson PA-C, MPAS  Physician Assistant  [facility].

## 2020-02-12 ENCOUNTER — TELEPHONE (OUTPATIENT)
Dept: HEMATOLOGY | Facility: CLINIC | Age: 45
End: 2020-02-12

## 2020-02-12 DIAGNOSIS — Z79.01 LONG TERM CURRENT USE OF ANTICOAGULANT THERAPY: ICD-10-CM

## 2020-02-12 DIAGNOSIS — Z79.01 CHRONIC ANTICOAGULATION: Primary | ICD-10-CM

## 2020-02-12 DIAGNOSIS — D68.51 HOMOZYGOUS FACTOR V LEIDEN MUTATION (H): ICD-10-CM

## 2020-02-12 DIAGNOSIS — Z86.718 HISTORY OF VENOUS THROMBOEMBOLISM: ICD-10-CM

## 2020-02-12 NOTE — TELEPHONE ENCOUNTER
[facility] [place]  [facility]  [address] [place], [place] [id number]  Main: [phone], Fax: [phone]    Telephone Note:    Patient: Zayra Altamirano  MRN: [id number]  : 1975  Date of this note written: 2020      Component      Latest Ref Rng & Units 2020   Sodium      133 - 144 mmol/L 139   Potassium      3.4 - 5.3 mmol/L 3.9   Chloride      94 - 109 mmol/L 104   Carbon Dioxide      20 - 32 mmol/L 30   Anion Gap      3 - 14 mmol/L 5   Glucose      70 - 99 mg/dL 85   Urea Nitrogen      7 - 30 mg/dL 9   Creatinine      0.52 - 1.04 mg/dL 0.78   GFR Estimate      >60 mL/min/1.73:m2 >90   GFR Estimate If Black      >60 mL/min/1.73:m2 >90   Calcium      8.5 - 10.1 mg/dL 8.8   Bilirubin Total      0.2 - 1.3 mg/dL 1.1   Albumin      3.4 - 5.0 g/dL 4.1   Protein Total      6.8 - 8.8 g/dL 7.3   Alkaline Phosphatase      40 - 150 U/L 51   ALT      0 - 50 U/L 63 (H)   AST      0 - 45 U/L 32   WBC      4.0 - 11.0 10e9/L 7.4   RBC Count      3.8 - 5.2 10e12/L 4.67   Hemoglobin      11.7 - 15.7 g/dL 14.0   Hematocrit      35.0 - 47.0 % 41.0   MCV      78 - 100 fl 88   MCH      26.5 - 33.0 pg 30.0   MCHC      31.5 - 36.5 g/dL 34.1   RDW      10.0 - 15.0 % 12.8   Platelet Count      150 - 450 10e9/L 276     This writer called Zayra on 2020 at 10:00am to report her lab test results done yesterday. Her ALT is slightly elevated at 63. She has been on Xarelto for one year now and thus, it is unlikely that Xarelto is the cause of her ALT elevation. Recommend repeat LFT's in one month. Patient will go to [facility] to get this done in a month. Order is placed in Epic.       Miky Hanson PA-C, MPAS  Physician Assistant  [facility].

## 2020-02-12 NOTE — NURSING NOTE
is a 44 year old woman homozygous for Factor V Leiden and history of PE and left LE DVT (imaged 2005) on long term anticoagulation. After evaluation by and discussion with Miky Hanson PA-C it was decided that she would continue pharmacological VTE prophylaxis with Rivaroxaban 10 mg orally every 24 hours.   Rivaroxaban discussed and reviewed.  VTE risk factors, signs, and symptoms discussed.   AVS reveiwed.  Mrs. Altamirano has the contact information for the Center and was encouraged to call with questions or concerns.

## 2020-03-23 DIAGNOSIS — Z79.01 CHRONIC ANTICOAGULATION: ICD-10-CM

## 2020-03-23 DIAGNOSIS — Z86.718 HISTORY OF VENOUS THROMBOEMBOLISM: ICD-10-CM

## 2020-03-23 NOTE — TELEPHONE ENCOUNTER
[facility]  [facility]  [address] [place] [address], [place] [address]  Main: [phone], Fax: [phone]    Telephone Note:    Patient: Zayra Altamirano  MRN: [id number]  : 1975  Date of this note written: 2020    Zayra sent an [employer] message on Epic inquiring about postponing lab test (Hepatic function). Please see this writer's Telephone note on 2020. Zayra suppose to get a repeat LFT done in one month after 2020. She has not done so as she is currently shelter in place with her 70 year old mother secondary to concern of COVID-19.     This writer called the patient on 3/23/2020 at 11:35am.     I explain to Zayra that her slight elevation of ALT 63 was unlikely related to Xarelto as she had been on Xarelto for at least one year at the time. I am not too concern about this mild elevation, thus there is no urgency at this time to repeat testing.     I recommend that Zayra is to consider getting LFT's rechecked in 2-3 months time.    Patient is in agreement with this plan.     I will sent off another refill of Xarelto to her preferred pharmacy today.      Miky Hanson PA-C, MPAS  Physician Assistant  [facility].

## 2020-08-20 ENCOUNTER — TELEPHONE (OUTPATIENT)
Dept: HEMATOLOGY | Facility: CLINIC | Age: 45
End: 2020-08-20

## 2020-08-20 NOTE — TELEPHONE ENCOUNTER
Mrs. Altamirano called with a question regarding her mother's anticoagulation in relation to a needed surgical procedure. I explained that she needed to discuss this with her mom's oncology team (her mom is currently receiving chemotherapy) and the oral surgeon. She ws in agreement with that plan.

## 2020-09-16 ENCOUNTER — HOSPITAL ENCOUNTER (OUTPATIENT)
Dept: MAMMOGRAPHY | Facility: CLINIC | Age: 45
Discharge: HOME | End: 2020-09-16
Attending: NURSE PRACTITIONER | Admitting: NURSE PRACTITIONER
Payer: COMMERCIAL

## 2020-09-16 DIAGNOSIS — Z12.31 VISIT FOR SCREENING MAMMOGRAM: ICD-10-CM

## 2020-09-16 PROCEDURE — 77063 BREAST TOMOSYNTHESIS BI: CPT

## 2020-10-12 ENCOUNTER — OFFICE VISIT (OUTPATIENT)
Dept: FAMILY MEDICINE | Facility: CLINIC | Age: 45
End: 2020-10-12
Payer: COMMERCIAL

## 2020-10-12 VITALS
OXYGEN SATURATION: 99 % | HEIGHT: 70 IN | BODY MASS INDEX: 25 KG/M2 | WEIGHT: 174.6 LBS | SYSTOLIC BLOOD PRESSURE: 117 MMHG | DIASTOLIC BLOOD PRESSURE: 81 MMHG | TEMPERATURE: 96.4 F | HEART RATE: 92 BPM | RESPIRATION RATE: 13 BRPM

## 2020-10-12 DIAGNOSIS — Z11.51 SCREENING FOR HUMAN PAPILLOMAVIRUS: ICD-10-CM

## 2020-10-12 DIAGNOSIS — A35 TETANUS: ICD-10-CM

## 2020-10-12 DIAGNOSIS — Z00.00 ROUTINE GENERAL MEDICAL EXAMINATION AT A HEALTH CARE FACILITY: Primary | ICD-10-CM

## 2020-10-12 LAB
ALBUMIN SERPL-MCNC: 3.7 G/DL (ref 3.4–5)
ALP SERPL-CCNC: 58 U/L (ref 40–150)
ALT SERPL W P-5'-P-CCNC: 53 U/L (ref 0–50)
ANION GAP SERPL CALCULATED.3IONS-SCNC: 6 MMOL/L (ref 3–14)
AST SERPL W P-5'-P-CCNC: 25 U/L (ref 0–45)
BILIRUB SERPL-MCNC: 0.6 MG/DL (ref 0.2–1.3)
BUN SERPL-MCNC: 13 MG/DL (ref 7–30)
CALCIUM SERPL-MCNC: 9.3 MG/DL (ref 8.5–10.1)
CHLORIDE SERPL-SCNC: 103 MMOL/L (ref 94–109)
CHOLEST SERPL-MCNC: 206 MG/DL
CO2 SERPL-SCNC: 28 MMOL/L (ref 20–32)
CREAT SERPL-MCNC: 0.84 MG/DL (ref 0.52–1.04)
GFR SERPL CREATININE-BSD FRML MDRD: 84 ML/MIN/{1.73_M2}
GLUCOSE SERPL-MCNC: 101 MG/DL (ref 70–99)
HDLC SERPL-MCNC: 55 MG/DL
LDLC SERPL CALC-MCNC: 126 MG/DL
NONHDLC SERPL-MCNC: 151 MG/DL
POTASSIUM SERPL-SCNC: 4.2 MMOL/L (ref 3.4–5.3)
PROT SERPL-MCNC: 7.3 G/DL (ref 6.8–8.8)
SODIUM SERPL-SCNC: 137 MMOL/L (ref 133–144)
TRIGL SERPL-MCNC: 125 MG/DL

## 2020-10-12 PROCEDURE — 80053 COMPREHEN METABOLIC PANEL: CPT | Performed by: NURSE PRACTITIONER

## 2020-10-12 PROCEDURE — 36415 COLL VENOUS BLD VENIPUNCTURE: CPT | Performed by: NURSE PRACTITIONER

## 2020-10-12 PROCEDURE — 80061 LIPID PANEL: CPT | Performed by: NURSE PRACTITIONER

## 2020-10-12 PROCEDURE — G0145 SCR C/V CYTO,THINLAYER,RESCR: HCPCS | Performed by: NURSE PRACTITIONER

## 2020-10-12 PROCEDURE — 99396 PREV VISIT EST AGE 40-64: CPT | Performed by: NURSE PRACTITIONER

## 2020-10-12 PROCEDURE — 90471 IMMUNIZATION ADMIN: CPT

## 2020-10-12 PROCEDURE — 87624 HPV HI-RISK TYP POOLED RSLT: CPT | Performed by: NURSE PRACTITIONER

## 2020-10-12 PROCEDURE — 90715 TDAP VACCINE 7 YRS/> IM: CPT

## 2020-10-12 ASSESSMENT — MIFFLIN-ST. JEOR: SCORE: 1508.26

## 2020-10-12 ASSESSMENT — ENCOUNTER SYMPTOMS
HEADACHES: 0
FREQUENCY: 0
JOINT SWELLING: 0
WEAKNESS: 0
HEMATURIA: 0
SORE THROAT: 0
DYSURIA: 0
COUGH: 0
NAUSEA: 0
CHILLS: 0
PARESTHESIAS: 0
HEMATOCHEZIA: 0
DIZZINESS: 0
SHORTNESS OF BREATH: 0
HEARTBURN: 0
BREAST MASS: 0
CONSTIPATION: 0
EYE PAIN: 0
PALPITATIONS: 0
ARTHRALGIAS: 0
FEVER: 0
DIARRHEA: 0
NERVOUS/ANXIOUS: 0
MYALGIAS: 0
ABDOMINAL PAIN: 0

## 2020-10-12 NOTE — NURSING NOTE
Prior to immunization administration, verified patients identity using patient s name and date of birth. Please see Immunization Activity for additional information.     Screening Questionnaire for Adult Immunization    Are you sick today?   No   Do you have allergies to medications, food, a vaccine component or latex?   No   Have you ever had a serious reaction after receiving a vaccination?   No   Do you have a long-term health problem with heart, lung, kidney, or metabolic disease (e.g., diabetes), asthma, a blood disorder, no spleen, complement component deficiency, a cochlear implant, or a spinal fluid leak?  Are you on long-term aspirin therapy?   No   Do you have cancer, leukemia, HIV/AIDS, or any other immune system problem?   No   Do you have a parent, brother, or sister with an immune system problem?   No   In the past 3 months, have you taken medications that affect  your immune system, such as prednisone, other steroids, or anticancer drugs; drugs for the treatment of rheumatoid arthritis, Crohn s disease, or psoriasis; or have you had radiation treatments?   No   Have you had a seizure, or a brain or other nervous system problem?   No   During the past year, have you received a transfusion of blood or blood    products, or been given immune (gamma) globulin or antiviral drug?   No   For women: Are you pregnant or is there a chance you could become       pregnant during the next month?   No   Have you received any vaccinations in the past 4 weeks?   Yes     Immunization questionnaire was positive for at least one answer.  Notified Jaquelin Duarte.        Per orders of Jaquelin Pugh, injection of  tdap given by Tony Dooley MA. Patient instructed to remain in clinic for 15 minutes afterwards, and to report any adverse reaction to me immediately.       Screening performed by Tony Dooley MA on 10/12/2020 at 9:20 AM.

## 2020-10-12 NOTE — PROGRESS NOTES
SUBJECTIVE:   CC: Zayra Altamirano is an 45 year old woman who presents for preventive health visit.       Patient has been advised of split billing requirements and indicates understanding: Yes  Healthy Habits:     Getting at least 3 servings of Calcium per day:  NO    Bi-annual eye exam:  Yes    Dental care twice a year:  Yes    Sleep apnea or symptoms of sleep apnea:  None    Diet:  Regular (no restrictions)    Frequency of exercise:  2-3 days/week    Duration of exercise:  15-30 minutes    Taking medications regularly:  Yes    Barriers to taking medications:  Not applicable    Medication side effects:  None    PHQ-2 Total Score: 0    Additional concerns today:  No    Today's PHQ-2 Score:   PHQ-2 ( 1999 Pfizer) 10/12/2020   Q1: Little interest or pleasure in doing things 0   Q2: Feeling down, depressed or hopeless 0   PHQ-2 Score 0   Q1: Little interest or pleasure in doing things Not at all   Q2: Feeling down, depressed or hopeless Not at all   PHQ-2 Score 0       Abuse: Current or Past (Physical, Sexual or Emotional) - No  Do you feel safe in your environment? Yes        Social History     Tobacco Use     Smoking status: Never Smoker     Smokeless tobacco: Never Used   Substance Use Topics     Alcohol use: Yes     Alcohol/week: 1.0 standard drinks     Comment: very occasional. 1 to 2x a month I may have a glass of wine     If you drink alcohol do you typically have >3 drinks per day or >7 drinks per week? No    Alcohol Use 10/12/2020   Prescreen: >3 drinks/day or >7 drinks/week? No   Prescreen: >3 drinks/day or >7 drinks/week? -       Reviewed orders with patient.  Reviewed health maintenance and updated orders accordingly - Yes    Mammogram Screening: Patient under age 50, mutual decision reflected in health maintenance.      Pertinent mammograms are reviewed under the imaging tab.  History of abnormal Pap smear: NO - age 30-65 PAP every 5 years with negative HPV co-testing recommended  PAP /  HPV Latest Ref Rng & Units 4/19/2017 3/20/2014 5/20/2013   PAP - NIL NIL NIL   HPV 16 DNA NEG Negative - -   HPV 18 DNA NEG Negative - -   OTHER HR HPV NEG Negative - -     Reviewed and updated as needed this visit by clinical staff  Tobacco  Allergies  Meds  Problems  Med Hx  Surg Hx  Fam Hx          Reviewed and updated as needed this visit by Provider  Tobacco  Allergies  Meds  Problems  Med Hx  Surg Hx  Fam Hx           Review of Systems   Constitutional: Negative for chills and fever.   HENT: Negative for congestion, ear pain, hearing loss and sore throat.    Eyes: Negative for pain and visual disturbance.   Respiratory: Negative for cough and shortness of breath.    Cardiovascular: Negative for chest pain, palpitations and peripheral edema.   Gastrointestinal: Negative for abdominal pain, constipation, diarrhea, heartburn, hematochezia and nausea.   Breasts:  Negative for tenderness, breast mass and discharge.   Genitourinary: Negative for dysuria, frequency, genital sores, hematuria, pelvic pain, urgency, vaginal bleeding and vaginal discharge.   Musculoskeletal: Negative for arthralgias, joint swelling and myalgias.   Skin: Negative for rash.   Neurological: Negative for dizziness, weakness, headaches and paresthesias.   Psychiatric/Behavioral: Negative for mood changes. The patient is not nervous/anxious.      CONSTITUTIONAL: NEGATIVE for fever, chills, change in weight  INTEGUMENTARU/SKIN: NEGATIVE for worrisome rashes, moles or lesions  EYES: NEGATIVE for vision changes or irritation  ENT: NEGATIVE for ear, mouth and throat problems  RESP: NEGATIVE for significant cough or SOB  BREAST: NEGATIVE for masses, tenderness or discharge  CV: NEGATIVE for chest pain, palpitations or peripheral edema  GI: NEGATIVE for nausea, abdominal pain, heartburn, or change in bowel habits  : NEGATIVE for unusual urinary or vaginal symptoms. Periods are regular.  MUSCULOSKELETAL: NEGATIVE for significant  "arthralgias or myalgia  NEURO: NEGATIVE for weakness, dizziness or paresthesias  PSYCHIATRIC: NEGATIVE for changes in mood or affect     OBJECTIVE:   /81 (BP Location: Right arm, Patient Position: Chair, Cuff Size: Adult Regular)   Pulse 92   Temp 96.4  F (35.8  C) (Tympanic)   Resp 13   Ht 1.772 m (5' 9.75\")   Wt 79.2 kg (174 lb 9.6 oz)   LMP 10/02/2020   SpO2 99%   BMI 25.23 kg/m    Physical Exam  GENERAL: healthy, alert and no distress  EYES: Eyes grossly normal to inspection, PERRL and conjunctivae and sclerae normal  HENT: ear canals and TM's normal, nose and mouth without ulcers or lesions  NECK: no adenopathy, no asymmetry, masses, or scars and thyroid normal to palpation  RESP: lungs clear to auscultation - no rales, rhonchi or wheezes  BREAST: normal without masses, tenderness or nipple discharge and no palpable axillary masses or adenopathy  CV: regular rate and rhythm, normal S1 S2, no S3 or S4, no murmur, click or rub, no peripheral edema and peripheral pulses strong  ABDOMEN: soft, nontender, no hepatosplenomegaly, no masses and bowel sounds normal   (female): normal female external genitalia, normal urethral meatus, vaginal mucosa pink, moist, well rugated, and normal cervix/adnexa/uterus without masses or discharge  MS: no gross musculoskeletal defects noted, no edema  SKIN: no suspicious lesions or rashes  NEURO: Normal strength and tone, mentation intact and speech normal  PSYCH: mentation appears normal, affect normal/bright      ASSESSMENT/PLAN:       ICD-10-CM    1. Routine general medical examination at a health care facility  Z00.00 Lipid panel reflex to direct LDL Fasting     Comprehensive metabolic panel     CANCELED: Glucose   2. Tetanus  A35 TDAP VACCINE   3. Screening for human papillomavirus  Z11.51 Pap imaged thin layer screen with HPV - recommended age 30 - 65 years (select HPV order below)     HPV High Risk Types DNA Cervical      well female,  fasting for labs.  " "Encouraged to continue exercise and healthy diet choices.  Pap done.      adacel given.        Patient has been advised of split billing requirements and indicates understanding: Yes  COUNSELING:  Reviewed preventive health counseling, as reflected in patient instructions    Estimated body mass index is 25.23 kg/m  as calculated from the following:    Height as of this encounter: 1.772 m (5' 9.75\").    Weight as of this encounter: 79.2 kg (174 lb 9.6 oz).        She reports that she has never smoked. She has never used smokeless tobacco.      Counseling Resources:  ATP IV Guidelines  Pooled Cohorts Equation Calculator  Breast Cancer Risk Calculator  BRCA-Related Cancer Risk Assessment: FHS-7 Tool  FRAX Risk Assessment  ICSI Preventive Guidelines  Dietary Guidelines for Americans, 2010  USDA's MyPlate  ASA Prophylaxis  Lung CA Screening    [facility]    "

## 2020-10-15 LAB
COPATH REPORT: NORMAL
PAP: NORMAL

## 2020-10-19 LAB
FINAL DIAGNOSIS: NORMAL
HPV HR 12 DNA CVX QL NAA+PROBE: NEGATIVE
HPV16 DNA SPEC QL NAA+PROBE: NEGATIVE
HPV18 DNA SPEC QL NAA+PROBE: NEGATIVE
SPECIMEN DESCRIPTION: NORMAL
SPECIMEN SOURCE CVX/VAG CYTO: NORMAL

## 2021-01-13 ENCOUNTER — TELEPHONE (OUTPATIENT)
Dept: HEMATOLOGY | Facility: CLINIC | Age: 46
End: 2021-01-13

## 2021-01-13 DIAGNOSIS — Z86.718 HISTORY OF VENOUS THROMBOEMBOLISM: ICD-10-CM

## 2021-01-13 DIAGNOSIS — Z79.01 CHRONIC ANTICOAGULATION: ICD-10-CM

## 2021-01-13 RX ORDER — RIVAROXABAN 10 MG/1
TABLET, FILM COATED ORAL
Qty: 90 TABLET | Refills: 1 | Status: SHIPPED | OUTPATIENT
Start: 2021-01-13 | End: 2021-07-06

## 2021-01-13 NOTE — TELEPHONE ENCOUNTER
RN spoke to Mrs. Altamirano regarding need to have hepatic panel checked. Her plan is to go into [facility] lab in the next couple of weeks to have blood drawn. Miky Hanson PA-C has order place.

## 2021-01-22 DIAGNOSIS — Z79.01 CHRONIC ANTICOAGULATION: ICD-10-CM

## 2021-01-22 DIAGNOSIS — Z79.01 LONG TERM CURRENT USE OF ANTICOAGULANT THERAPY: ICD-10-CM

## 2021-01-22 DIAGNOSIS — Z86.718 HISTORY OF VENOUS THROMBOEMBOLISM: ICD-10-CM

## 2021-01-22 DIAGNOSIS — D68.51 HOMOZYGOUS FACTOR V LEIDEN MUTATION (H): ICD-10-CM

## 2021-01-22 LAB
ALBUMIN SERPL-MCNC: 3.8 G/DL (ref 3.4–5)
ALP SERPL-CCNC: 53 U/L (ref 40–150)
ALT SERPL W P-5'-P-CCNC: 39 U/L (ref 0–50)
AST SERPL W P-5'-P-CCNC: 29 U/L (ref 0–45)
BILIRUB DIRECT SERPL-MCNC: 0.2 MG/DL (ref 0–0.2)
BILIRUB SERPL-MCNC: 1.1 MG/DL (ref 0.2–1.3)
PROT SERPL-MCNC: 7.2 G/DL (ref 6.8–8.8)

## 2021-01-22 PROCEDURE — 36415 COLL VENOUS BLD VENIPUNCTURE: CPT | Performed by: PHYSICIAN ASSISTANT

## 2021-01-22 PROCEDURE — 80076 HEPATIC FUNCTION PANEL: CPT | Performed by: PHYSICIAN ASSISTANT

## 2021-03-15 ENCOUNTER — IMMUNIZATION (OUTPATIENT)
Dept: NURSING | Facility: CLINIC | Age: 46
End: 2021-03-15
Payer: COMMERCIAL

## 2021-03-15 PROCEDURE — 91300 PR COVID VAC PFIZER DIL RECON 30 MCG/0.3 ML IM: CPT

## 2021-03-15 PROCEDURE — 0001A PR COVID VAC PFIZER DIL RECON 30 MCG/0.3 ML IM: CPT

## 2021-04-05 ENCOUNTER — IMMUNIZATION (OUTPATIENT)
Dept: NURSING | Facility: CLINIC | Age: 46
End: 2021-04-05
Payer: COMMERCIAL

## 2021-04-05 PROCEDURE — 0002A PR COVID VAC PFIZER DIL RECON 30 MCG/0.3 ML IM: CPT

## 2021-04-05 PROCEDURE — 91300 PR COVID VAC PFIZER DIL RECON 30 MCG/0.3 ML IM: CPT

## 2021-05-31 ENCOUNTER — RECORDS - HEALTHEAST (OUTPATIENT)
Dept: ADMINISTRATIVE | Facility: CLINIC | Age: 46
End: 2021-05-31

## 2021-08-30 ENCOUNTER — TELEPHONE (OUTPATIENT)
Dept: HEMATOLOGY | Facility: CLINIC | Age: 46
End: 2021-08-30

## 2021-08-30 NOTE — TELEPHONE ENCOUNTER
[facility]  [facility]  [facility], [address], [place] [address]  Main: [phone], Fax: [phone]    Telephone Note:    Patient: Zayra Altamirano  MRN: [id number]  : 1975  Date of this note written:    Zayra sent a [employer] message and inquire about topical vaginal miconazole use and interaction with DOAC about 11 days ago. Unfortunately, this message did not get to me until today.     This writer called Zayra on 2021 at 10:00am to discuss. I explain to Zayra that with transvaginal miconazole use, it has very minimal blood stream absorption and thus it will only have very minimal to no interaction with DOAC.     I have answered some of her other questions to her satisfaction today.     Her last visit with me was back in 2020. She is due for her routine annual follow up. I asked her to call our  to schedule her appointment with me at the next available and she agrees.      Miky Hanson PA-C, MPAS  Physician Assistant  [facility].

## 2021-09-12 ENCOUNTER — HEALTH MAINTENANCE LETTER (OUTPATIENT)
Age: 46
End: 2021-09-12

## 2021-09-20 ENCOUNTER — IMMUNIZATION (OUTPATIENT)
Dept: PEDIATRICS | Facility: CLINIC | Age: 46
End: 2021-09-20
Payer: COMMERCIAL

## 2021-09-20 ENCOUNTER — ANCILLARY PROCEDURE (OUTPATIENT)
Dept: MAMMOGRAPHY | Facility: CLINIC | Age: 46
End: 2021-09-20
Attending: NURSE PRACTITIONER
Payer: COMMERCIAL

## 2021-09-20 DIAGNOSIS — Z12.31 VISIT FOR SCREENING MAMMOGRAM: ICD-10-CM

## 2021-09-20 PROCEDURE — 77067 SCR MAMMO BI INCL CAD: CPT | Mod: TC | Performed by: RADIOLOGY

## 2021-09-20 PROCEDURE — 77063 BREAST TOMOSYNTHESIS BI: CPT | Mod: TC | Performed by: RADIOLOGY

## 2021-09-20 PROCEDURE — 90471 IMMUNIZATION ADMIN: CPT

## 2021-09-20 PROCEDURE — 90686 IIV4 VACC NO PRSV 0.5 ML IM: CPT

## 2021-10-08 ENCOUNTER — IMMUNIZATION (OUTPATIENT)
Dept: NURSING | Facility: CLINIC | Age: 46
End: 2021-10-08
Payer: COMMERCIAL

## 2021-10-08 PROCEDURE — 91300 PR COVID VAC PFIZER DIL RECON 30 MCG/0.3 ML IM: CPT

## 2021-10-08 PROCEDURE — 0004A PR COVID VAC PFIZER DIL RECON 30 MCG/0.3 ML IM: CPT

## 2021-10-11 DIAGNOSIS — D68.51 HOMOZYGOUS FACTOR V LEIDEN MUTATION (H): ICD-10-CM

## 2021-10-11 DIAGNOSIS — Z79.01 CHRONIC ANTICOAGULATION: ICD-10-CM

## 2021-10-11 DIAGNOSIS — Z86.718 HISTORY OF VENOUS THROMBOEMBOLISM: ICD-10-CM

## 2021-10-11 DIAGNOSIS — Z79.01 LONG TERM CURRENT USE OF ANTICOAGULANT THERAPY: Primary | ICD-10-CM

## 2021-10-27 ENCOUNTER — DOCUMENTATION ONLY (OUTPATIENT)
Dept: LAB | Facility: CLINIC | Age: 46
End: 2021-10-27

## 2021-10-27 NOTE — PROGRESS NOTES
Zayra Ramos Humphreyosmel Altamirano has an upcoming lab appointment:    Future Appointments   Date Time Provider Department [place]   10/28/2021  1:15 PM [facility]    12/7/2021  8:20 AM Jaquelin Duarte APRN CNP [facility]    1/4/2022  9:30 AM Miky Hanson PA-C [facility]       Patient either has no future order, or has Health Maintenance labs due. Please review and place either future orders or HMPO (Review of Health Maintenance Protocol Orders), as appropriate.    Health Maintenance Due   Topic     ANNUAL REVIEW OF HM ORDERS      HEPATITIS C SCREENING      Kim Garcia

## 2021-10-28 ENCOUNTER — LAB (OUTPATIENT)
Dept: LAB | Facility: CLINIC | Age: 46
End: 2021-10-28
Payer: COMMERCIAL

## 2021-10-28 DIAGNOSIS — Z86.718 HISTORY OF VENOUS THROMBOEMBOLISM: ICD-10-CM

## 2021-10-28 DIAGNOSIS — Z79.01 LONG TERM CURRENT USE OF ANTICOAGULANT THERAPY: ICD-10-CM

## 2021-10-28 DIAGNOSIS — D68.51 HOMOZYGOUS FACTOR V LEIDEN MUTATION (H): ICD-10-CM

## 2021-10-28 DIAGNOSIS — Z79.01 CHRONIC ANTICOAGULATION: ICD-10-CM

## 2021-10-28 LAB
ERYTHROCYTE [DISTWIDTH] IN BLOOD BY AUTOMATED COUNT: 12.5 % (ref 10–15)
HCT VFR BLD AUTO: 41.1 % (ref 35–47)
HGB BLD-MCNC: 14.2 G/DL (ref 11.7–15.7)
MCH RBC QN AUTO: 30.6 PG (ref 26.5–33)
MCHC RBC AUTO-ENTMCNC: 34.5 G/DL (ref 31.5–36.5)
MCV RBC AUTO: 89 FL (ref 78–100)
PLATELET # BLD AUTO: 287 10E3/UL (ref 150–450)
RBC # BLD AUTO: 4.64 10E6/UL (ref 3.8–5.2)
WBC # BLD AUTO: 8.1 10E3/UL (ref 4–11)

## 2021-10-28 PROCEDURE — 85027 COMPLETE CBC AUTOMATED: CPT

## 2021-10-28 PROCEDURE — 36415 COLL VENOUS BLD VENIPUNCTURE: CPT

## 2021-10-28 PROCEDURE — 80053 COMPREHEN METABOLIC PANEL: CPT

## 2021-10-29 LAB
ALBUMIN SERPL-MCNC: 3.8 G/DL (ref 3.4–5)
ALP SERPL-CCNC: 48 U/L (ref 40–150)
ALT SERPL W P-5'-P-CCNC: 28 U/L (ref 0–50)
ANION GAP SERPL CALCULATED.3IONS-SCNC: 3 MMOL/L (ref 3–14)
AST SERPL W P-5'-P-CCNC: 19 U/L (ref 0–45)
BILIRUB SERPL-MCNC: 0.8 MG/DL (ref 0.2–1.3)
BUN SERPL-MCNC: 14 MG/DL (ref 7–30)
CALCIUM SERPL-MCNC: 8.8 MG/DL (ref 8.5–10.1)
CHLORIDE BLD-SCNC: 102 MMOL/L (ref 94–109)
CO2 SERPL-SCNC: 30 MMOL/L (ref 20–32)
CREAT SERPL-MCNC: 0.73 MG/DL (ref 0.52–1.04)
GFR SERPL CREATININE-BSD FRML MDRD: >90 ML/MIN/1.73M2
GLUCOSE BLD-MCNC: 101 MG/DL (ref 70–99)
POTASSIUM BLD-SCNC: 4.6 MMOL/L (ref 3.4–5.3)
PROT SERPL-MCNC: 7.3 G/DL (ref 6.8–8.8)
SODIUM SERPL-SCNC: 135 MMOL/L (ref 133–144)

## 2021-12-04 ASSESSMENT — ENCOUNTER SYMPTOMS
CONSTIPATION: 0
SHORTNESS OF BREATH: 0
SORE THROAT: 0
ABDOMINAL PAIN: 0
HEMATURIA: 0
HEMATOCHEZIA: 0
CHILLS: 0
NERVOUS/ANXIOUS: 0
WEAKNESS: 0
EYE PAIN: 0
BREAST MASS: 0
PALPITATIONS: 0
FEVER: 0
HEARTBURN: 0
MYALGIAS: 0
DIZZINESS: 0
FREQUENCY: 0
PARESTHESIAS: 0
DYSURIA: 0
HEADACHES: 0
DIARRHEA: 0
COUGH: 0
ARTHRALGIAS: 0
NAUSEA: 0
JOINT SWELLING: 0

## 2021-12-07 ENCOUNTER — OFFICE VISIT (OUTPATIENT)
Dept: FAMILY MEDICINE | Facility: CLINIC | Age: 46
End: 2021-12-07
Payer: COMMERCIAL

## 2021-12-07 VITALS
BODY MASS INDEX: 26.2 KG/M2 | OXYGEN SATURATION: 100 % | WEIGHT: 183 LBS | TEMPERATURE: 97.6 F | DIASTOLIC BLOOD PRESSURE: 70 MMHG | RESPIRATION RATE: 16 BRPM | HEIGHT: 70 IN | HEART RATE: 77 BPM | SYSTOLIC BLOOD PRESSURE: 106 MMHG

## 2021-12-07 DIAGNOSIS — R41.840 CONCENTRATION DEFICIT: ICD-10-CM

## 2021-12-07 DIAGNOSIS — Z13.220 LIPID SCREENING: ICD-10-CM

## 2021-12-07 DIAGNOSIS — Z13.1 SCREENING FOR DIABETES MELLITUS: ICD-10-CM

## 2021-12-07 DIAGNOSIS — Z00.00 ROUTINE GENERAL MEDICAL EXAMINATION AT A HEALTH CARE FACILITY: Primary | ICD-10-CM

## 2021-12-07 PROCEDURE — 80061 LIPID PANEL: CPT | Performed by: NURSE PRACTITIONER

## 2021-12-07 PROCEDURE — 82947 ASSAY GLUCOSE BLOOD QUANT: CPT | Performed by: NURSE PRACTITIONER

## 2021-12-07 PROCEDURE — 36415 COLL VENOUS BLD VENIPUNCTURE: CPT | Performed by: NURSE PRACTITIONER

## 2021-12-07 PROCEDURE — 99396 PREV VISIT EST AGE 40-64: CPT | Performed by: NURSE PRACTITIONER

## 2021-12-07 ASSESSMENT — ENCOUNTER SYMPTOMS
COUGH: 0
MYALGIAS: 0
NERVOUS/ANXIOUS: 0
ABDOMINAL PAIN: 0
NAUSEA: 0
HEARTBURN: 0
DIZZINESS: 0
HEMATOCHEZIA: 0
FEVER: 0
SORE THROAT: 0
HEADACHES: 0
HEMATURIA: 0
WEAKNESS: 0
DYSURIA: 0
JOINT SWELLING: 0
CHILLS: 0
FREQUENCY: 0
PARESTHESIAS: 0
PALPITATIONS: 0
DIARRHEA: 0
BREAST MASS: 0
ARTHRALGIAS: 0
EYE PAIN: 0
SHORTNESS OF BREATH: 0
CONSTIPATION: 0

## 2021-12-07 ASSESSMENT — MIFFLIN-ST. JEOR: SCORE: 1545.33

## 2021-12-07 NOTE — PROGRESS NOTES
"   SUBJECTIVE:   CC: Zayra Altamirano is an 46 year old woman who presents for preventive health visit.       Patient has been advised of split billing requirements and indicates understanding: Yes  Healthy Habits:     Getting at least 3 servings of Calcium per day:  Yes    Bi-annual eye exam:  Yes    Dental care twice a year:  Yes    Sleep apnea or symptoms of sleep apnea:  None    Diet:  Regular (no restrictions)    Frequency of exercise:  4-5 days/week    Duration of exercise:  30-45 minutes    Taking medications regularly:  Yes    Medication side effects:  None    PHQ-2 Total Score: 0    Additional concerns today:  Yes    Wondering if she has ADHD  Wants to get testing  Has had lifelong difficulties staying organized, forgetful, scatterbrained.   Makes \"lists for lists. \"   Youngest child recently tested and started on medications and wondering if this would be helpful for her?        Today's PHQ-2 Score:   PHQ-2 ( 1999 Pfizer) 12/4/2021   Q1: Little interest or pleasure in doing things 0   Q2: Feeling down, depressed or hopeless 0   PHQ-2 Score 0   PHQ-2 Total Score (12-17 Years)- Positive if 3 or more points; Administer PHQ-A if positive -   Q1: Little interest or pleasure in doing things Not at all   Q2: Feeling down, depressed or hopeless Not at all   PHQ-2 Score 0       Abuse: Current or Past (Physical, Sexual or Emotional) - No  Do you feel safe in your environment? Yes    Have you ever done Advance Care Planning? (For example, a Health Directive, POLST, or a discussion with a medical provider or your loved ones about your wishes): No, advance care planning information given to patient to review.  Patient plans to discuss their wishes with loved ones or provider.      Social History     Tobacco Use     Smoking status: Never Smoker     Smokeless tobacco: Never Used   Substance Use Topics     Alcohol use: Yes     Alcohol/week: 1.0 standard drink     Comment: very occasional. 1 to 2x a month I may " have a glass of wine       Alcohol Use 12/4/2021   Prescreen: >3 drinks/day or >7 drinks/week? No   Prescreen: >3 drinks/day or >7 drinks/week? -       Reviewed orders with patient.  Reviewed health maintenance and updated orders accordingly - Yes      Breast Cancer Screening:    FHS-7:   Breast CA Risk Assessment (FHS-7) 9/20/2021 12/4/2021   Did any of your first-degree relatives have breast or ovarian cancer? Yes Yes   Did any of your relatives have bilateral breast cancer? No No   Did any man in your family have breast cancer? No No   Did any woman in your family have breast and ovarian cancer? No Yes   Did any woman in your family have breast cancer before age 50 y? No No   Do you have 2 or more relatives with breast and/or ovarian cancer? Yes Yes   Do you have 2 or more relatives with breast and/or bowel cancer? No No       Mammogram Screening: Recommended annual mammography  Pertinent mammograms are reviewed under the imaging tab.    History of abnormal Pap smear: NO - age 30-65 PAP every 5 years with negative HPV co-testing recommended  PAP / HPV Latest Ref Rng & Units 10/12/2020 4/19/2017 3/20/2014   PAP (Historical) - NIL NIL NIL   HPV16 NEG:Negative Negative Negative -   HPV18 NEG:Negative Negative Negative -   HRHPV NEG:Negative Negative Negative -     Reviewed and updated as needed this visit by clinical staff  Tobacco  Allergies  Meds  Problems  Med Hx  Surg Hx  Fam Hx  Soc Hx         Reviewed and updated as needed this visit by Provider  Tobacco  Allergies  Meds  Problems  Med Hx  Surg Hx  Fam Hx          Review of Systems   Constitutional: Negative for chills and fever.   HENT: Negative for congestion, ear pain, hearing loss and sore throat.    Eyes: Negative for pain and visual disturbance.   Respiratory: Negative for cough and shortness of breath.    Cardiovascular: Negative for chest pain, palpitations and peripheral edema.   Gastrointestinal: Negative for abdominal pain,  "constipation, diarrhea, heartburn, hematochezia and nausea.   Breasts:  Negative for tenderness, breast mass and discharge.   Genitourinary: Negative for dysuria, frequency, genital sores, hematuria, pelvic pain, urgency, vaginal bleeding and vaginal discharge.   Musculoskeletal: Negative for arthralgias, joint swelling and myalgias.   Skin: Negative for rash.   Neurological: Negative for dizziness, weakness, headaches and paresthesias.   Psychiatric/Behavioral: Negative for mood changes. The patient is not nervous/anxious.           OBJECTIVE:   /70   Pulse 77   Temp 97.6  F (36.4  C)   Resp 16   Ht 1.778 m (5' 10\")   Wt 83 kg (183 lb)   LMP 11/16/2021 (Exact Date)   SpO2 100%   Breastfeeding No   BMI 26.26 kg/m    Physical Exam  GENERAL: healthy, alert and no distress  EYES: Eyes grossly normal to inspection, PERRL and conjunctivae and sclerae normal  HENT: ear canals and TM's normal, nose and mouth without ulcers or lesions  NECK: no adenopathy, no asymmetry, masses, or scars and thyroid normal to palpation  RESP: lungs clear to auscultation - no rales, rhonchi or wheezes  BREAST: normal without masses, tenderness or nipple discharge and no palpable axillary masses or adenopathy  CV: regular rate and rhythm, normal S1 S2, no S3 or S4, no murmur, click or rub, no peripheral edema and peripheral pulses strong  ABDOMEN: soft, nontender, no hepatosplenomegaly, no masses and bowel sounds normal  MS: no gross musculoskeletal defects noted, no edema  SKIN: no suspicious lesions or rashes  NEURO: Normal strength and tone, mentation intact and speech normal  PSYCH: mentation appears normal, affect normal/bright        ASSESSMENT/PLAN:       ICD-10-CM    1. Routine general medical examination at a health care facility  Z00.00 REVIEW OF HEALTH MAINTENANCE PROTOCOL ORDERS   2. Concentration deficit  R41.840 MENTAL HEALTH REFERRAL  - Adult; Assessments and Testing; ADHD; [facility] " "[phone]; We will contact you to schedule the appointment or please call with any questions   3. Lipid screening  Z13.220 Lipid panel reflex to direct LDL Fasting     Lipid panel reflex to direct LDL Fasting   4. Screening for diabetes mellitus  Z13.1 Glucose     Glucose      well female, fasting for labs.  Encouraged to continue exercise and healthy diet choices.      Refer for ADHD eval.      Patient has been advised of split billing requirements and indicates understanding: Yes  COUNSELING:  Reviewed preventive health counseling, as reflected in patient instructions    Estimated body mass index is 26.26 kg/m  as calculated from the following:    Height as of this encounter: 1.778 m (5' 10\").    Weight as of this encounter: 83 kg (183 lb).      She reports that she has never smoked. She has never used smokeless tobacco.      Counseling Resources:  ATP IV Guidelines  Pooled Cohorts Equation Calculator  Breast Cancer Risk Calculator  BRCA-Related Cancer Risk Assessment: FHS-7 Tool  FRAX Risk Assessment  ICSI Preventive Guidelines  Dietary Guidelines for Americans, 2010  USDA's MyPlate  ASA Prophylaxis  Lung CA Screening    [facility]  "

## 2021-12-08 LAB
FASTING STATUS PATIENT QL REPORTED: YES
GLUCOSE BLD-MCNC: 95 MG/DL (ref 70–99)

## 2021-12-15 LAB
CHOLEST SERPL-MCNC: 205 MG/DL
FASTING STATUS PATIENT QL REPORTED: YES
HDLC SERPL-MCNC: 68 MG/DL
LDLC SERPL CALC-MCNC: 117 MG/DL
NONHDLC SERPL-MCNC: 137 MG/DL
TRIGL SERPL-MCNC: 99 MG/DL

## 2022-01-04 ENCOUNTER — OFFICE VISIT (OUTPATIENT)
Dept: HEMATOLOGY | Facility: CLINIC | Age: 47
End: 2022-01-04
Attending: PHYSICIAN ASSISTANT
Payer: COMMERCIAL

## 2022-01-04 VITALS
BODY MASS INDEX: 27.23 KG/M2 | SYSTOLIC BLOOD PRESSURE: 137 MMHG | HEART RATE: 79 BPM | WEIGHT: 190.2 LBS | TEMPERATURE: 97.8 F | OXYGEN SATURATION: 99 % | HEIGHT: 70 IN | RESPIRATION RATE: 15 BRPM | DIASTOLIC BLOOD PRESSURE: 91 MMHG

## 2022-01-04 DIAGNOSIS — Z79.01 CHRONIC ANTICOAGULATION: Primary | ICD-10-CM

## 2022-01-04 DIAGNOSIS — Z86.718 HISTORY OF VENOUS THROMBOEMBOLISM: ICD-10-CM

## 2022-01-04 PROCEDURE — 99214 OFFICE O/P EST MOD 30 MIN: CPT | Performed by: PHYSICIAN ASSISTANT

## 2022-01-04 PROCEDURE — G0463 HOSPITAL OUTPT CLINIC VISIT: HCPCS

## 2022-01-04 PROCEDURE — 99207 PR CDG-MDM COMPONENT: MEETS MODERATE - UP CODED: CPT | Performed by: PHYSICIAN ASSISTANT

## 2022-01-04 ASSESSMENT — MIFFLIN-ST. JEOR: SCORE: 1577.99

## 2022-01-04 ASSESSMENT — PAIN SCALES - GENERAL: PAINLEVEL: NO PAIN (0)

## 2022-01-04 NOTE — PROGRESS NOTES
[facility]  [address] [place] [id number]  Main: [phone], Fax: [phone]    Patient seen at: [facility] at [address] [place] [address]    Outpatient Visit Note:    Patient: Zayra Altamirano  MRN: [id number]  : 1975  TIMBO: 2022    Reason:  History of estrogen provoked venous thromboembolism. Homozygous factor V Leiden mutation. Chronic anticoagulation therapy. Here for routine annual follow up visit.      Clinical History Summary:  Zayra is a 46 year old female with a history of estrogen provoked venous thromboembolism who is found to have homozygous factor V Leiden mutation, currently on chronic anticoagulation therapy with Xarelto at 10 mg PO Qday dosing, here for her routine yearly follow up clinic visit.     Thrombosis History Summary:    Dec 2005, she was using NuvaRing for about one year when she presented with left leg DVT and bilateral pulmonary embolism. She was then found to have homozygous factor V Leiden mutation which became the basis for her staying on indefinite anticoagulation therapy. She was on coumadin and switch to rivaroxaban at 10 mg PO Qday dosing back in 2019 after her visit with Dr. Nagy, staff hematologist at this clinic (who has since left the practice) in 2018      Interim History:  She reports no issues with bleeding complications. Today she denies any lower extremity swelling or pain. She denies any shortness of breath. No chest pain. Denies any epistaxis. No issues with oral mucosal bleeding. Denies any issues with hematuria or blood in stools.     ROS:  No other complaints today.     Medication, Allergies and PmHx:  All have been reviewed by this writer in the electronic medical records.    Social History and Family History:  Deferred.    Objective:  Pleasant in no acute distress.  Vitals: BP (!) 137/91 (BP Location: Right arm, Patient Position: Sitting,  "Cuff Size: Adult Regular)   Pulse 79   Temp 97.8  F (36.6  C) (Oral)   Resp 15   Ht 1.778 m (5' 10\")   Wt 86.3 kg (190 lb 3.2 oz)   SpO2 99%   BMI 27.29 kg/m    Exam:  Complete exam is not performed today.     Labs:  Component      Latest Ref Rng & Units 10/28/2021   Sodium      133 - 144 mmol/L 135   Potassium      3.4 - 5.3 mmol/L 4.6   Chloride      94 - 109 mmol/L 102   Carbon Dioxide      20 - 32 mmol/L 30   Anion Gap      3 - 14 mmol/L 3   Urea Nitrogen      7 - 30 mg/dL 14   Creatinine      0.52 - 1.04 mg/dL 0.73   Calcium      8.5 - 10.1 mg/dL 8.8   Glucose      70 - 99 mg/dL 101 (H)   Alkaline Phosphatase      40 - 150 U/L 48   AST      0 - 45 U/L 19   ALT      0 - 50 U/L 28   Protein Total      6.8 - 8.8 g/dL 7.3   Albumin      3.4 - 5.0 g/dL 3.8   Bilirubin Total      0.2 - 1.3 mg/dL 0.8   GFR Estimate      >60 mL/min/1.73m2 >90   WBC      4.0 - 11.0 10e3/uL 8.1   RBC Count      3.80 - 5.20 10e6/uL 4.64   Hemoglobin      11.7 - 15.7 g/dL 14.2   Hematocrit      35.0 - 47.0 % 41.1   MCV      78 - 100 fL 89   MCH      26.5 - 33.0 pg 30.6   MCHC      31.5 - 36.5 g/dL 34.5   RDW      10.0 - 15.0 % 12.5   Platelet Count      150 - 450 10e3/uL 287     Assessment:  In summary, Zayra is a 46 year old female with a history of estrogen provoked pulmonary embolism back in 2005, who is also found to have homozygous factor V Leiden mutation, currently on long term anticoagulation therapy with Xarelto at 10 mg PO Qday, returns to clinic today for her annual routine follow up visit with this clinic. Zayra has done well since she switched from Coumadin to Xarelto back in Feb 2019. She has no issues with bleeding or any recurrent venous thromboembolic events. She is very content and happy with the convenience that Xarelto has provided her.     Diagnosis:  1. History of estrogen provoked pulmonary embolism and lower extremity DVT back in 2005.  2. Homozygous factor V Leiden Mutation.  3. Chronic anticoagulation " therapy.     Plan:  No change in regard to her anticoagulation therpay. She will continue with rivaroxaban at 10 mg PO Qday dosing as her mainstay of secondary pharmacological DVT/PE prophylaxis.    She is instructed to call if she should experience any unusual bleeding complications or if she should need any invasive or surgical procedures in the future.     She is contemplating having cosmetic superficial veins procedure done to her lower extremities. I have asked her to discuss this with the vascular medicine physician in regard to need for holding anticoagulation if she should need any invasive procedures and call us if needs our input about holding her rivaroxaban for procedures.     She already had her CBC and CMP done back in Oct 2021 and were all within normal range. Will continue once yearly monitoring while on rivaroxaban.     Will plan to see her back in one year time.     12 minutes spent on the date of the encounter doing chart review, history and exam, documentation and further activities per the note.    Time IN: 09:31  Time OUT: 09:38           Miky Hanson PA-C, MPAS  Physician Assistant  [facility].

## 2022-03-24 NOTE — PROGRESS NOTES
Trial course of PPI. If persistent then refer for EGD if H pylori is negative.  Probiotics.   Zinc carnosine/Peptic Care from pure 1 cpa per day. Increase water.      Assessment & Plan     Screen for colon cancer  Due for screen. No contra indication to cologuard.   - COLOGUARD(EXACT SCIENCES)    Chronic fatigue  Weight gain  Rule out underlying disorder as contributing factor. Discussed lifestyle changes including trial of elimination diet, increased activity. Can consider adrenal testing. Further plan pending test results. Follow up for annual exam otherwise.   - Vitamin D Deficiency  - CBC with platelets  - Comprehensive metabolic panel (BMP + Alb, Alk Phos, ALT, AST, Total. Bili, TP)  - Ferritin  - TSH  - T4, free  - T3, Free  - Thyroid peroxidase antibody  - Insulin level  - Vitamin D Deficiency  - CBC with platelets  - Comprehensive metabolic panel (BMP + Alb, Alk Phos, ALT, AST, Total. Bili, TP)  - Ferritin  - TSH  - T4, free  - T3, Free  - Thyroid peroxidase antibody  - Insulin level      Heartburn  Need to rule out  H pylori. Heartburn is under control for most part at this point.   - Helicobacter pylori Antigen Stool      Pema Doll MD  [facility]    Coral Iverson is a 46 year old who presents for the following health issues     HPI     GERD/Heartburn  Onset/Duration: worsening   Description:   Intensity: mild  Progression of Symptoms: worsening  Accompanying Signs & Symptoms:  Does it feel like food gets stuck or trouble swallowing: YES- both  Nausea: no  Vomiting (bloody?): no  Abdominal Pain: YES- bloating but has resolved  Black-Tarry stools: no  Bloody stools: no  History:  Previous similar episodes: YES  Previous ulcers: no  Precipitating factors:   Caffeine use: no  Alcohol use: no  NSAID/Aspirin use: no  Tobacco use: no  Worse with spicy foods and crunchy foods.  Alleviating factors: None  Therapies tried and outcome:             Lifestyle changes: None         "    Medications: digestive enzyme, probiotic, and HCL helped and zinc carnosine    Additional: was taking doxycycline for rosacea and may have caused heartburn pt believes     Took a course of doxycycline for a couple of weeks and then developed heartburn about 3 months ago. Heartburn has mostly resolved although lingering fatigue has lasted. Now avoiding GERD triggers and symptoms have gone away unless she lays down after eating. Saw a naturopath.     Also noting fatigue and weight gain. Isn't sure if it's because she is tired and doesn't want to exercise. Has gained 10 lbs since before Christmas. Wondering about another medical issue going on.      Notes eating more since Dec and hasn't been out walking as often as usual.     Having regular menstrual cycles still. Mother went through menopause in her early 50s.     GI: occasional abdominal bloating, no constipation or diarrhea. Eats a fair amount of salads, fruits.     PMH: gestational diabetes in first pregnancy.  H/o bilateral PE in 2005 and Factor V Leiden homozygous. She is on anticoagulated on Xarelto.    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 Kids are ages 10 and 14. Walking dog mostly for exercise (regular every day), an hour per day. Looking to get back into yoga and weight bearing exercises. Doesn't follow any specific diet. Lost 10 lb in past with Noom. Has tried intermittent fasting in past but hard to be consistent. No avoidance of gluten or dairy.       Objective    /75 (BP Location: Left arm, Patient Position: Sitting, Cuff Size: Adult Regular)   Pulse 82   Temp 97.5  F (36.4  C) (Temporal)   Resp 16   Ht 1.778 m (5' 10\")   Wt 89.4 kg (197 lb)   LMP 03/01/2022   SpO2 97%   BMI 28.27 kg/m    Body mass index is 28.27 kg/m .  Physical Exam   GENERAL: healthy, alert and no distress  EYES: Eyes grossly normal to inspection, PERRL and conjunctivae and sclerae normal  NECK: no adenopathy, no asymmetry, masses, or scars and thyroid normal to " palpation  RESP: lungs clear to auscultation - no rales, rhonchi or wheezes  CV: regular rate and rhythm, normal S1 S2, no S3 or S4, no murmur, click or rub, no peripheral edema and peripheral pulses strong  ABDOMEN: soft, nontender, no hepatosplenomegaly, no masses and bowel sounds normal  MS: no gross musculoskeletal defects noted, no edema  SKIN: no suspicious lesions or rashes  NEURO: Normal strength and tone, mentation intact and speech normal  PSYCH: mentation appears normal, affect normal/bright    Lab in process.

## 2022-03-25 ENCOUNTER — OFFICE VISIT (OUTPATIENT)
Dept: FAMILY MEDICINE | Facility: CLINIC | Age: 47
End: 2022-03-25
Payer: COMMERCIAL

## 2022-03-25 VITALS
TEMPERATURE: 97.5 F | WEIGHT: 197 LBS | BODY MASS INDEX: 28.2 KG/M2 | HEIGHT: 70 IN | OXYGEN SATURATION: 97 % | DIASTOLIC BLOOD PRESSURE: 75 MMHG | RESPIRATION RATE: 16 BRPM | HEART RATE: 82 BPM | SYSTOLIC BLOOD PRESSURE: 128 MMHG

## 2022-03-25 DIAGNOSIS — Z12.11 SCREEN FOR COLON CANCER: ICD-10-CM

## 2022-03-25 DIAGNOSIS — R63.5 WEIGHT GAIN: ICD-10-CM

## 2022-03-25 DIAGNOSIS — R53.82 CHRONIC FATIGUE: Primary | ICD-10-CM

## 2022-03-25 DIAGNOSIS — R12 HEARTBURN: ICD-10-CM

## 2022-03-25 LAB
ALBUMIN SERPL-MCNC: 3.5 G/DL (ref 3.4–5)
ALP SERPL-CCNC: 54 U/L (ref 40–150)
ALT SERPL W P-5'-P-CCNC: 25 U/L (ref 0–50)
ANION GAP SERPL CALCULATED.3IONS-SCNC: 6 MMOL/L (ref 3–14)
AST SERPL W P-5'-P-CCNC: 14 U/L (ref 0–45)
BILIRUB SERPL-MCNC: 0.3 MG/DL (ref 0.2–1.3)
BUN SERPL-MCNC: 18 MG/DL (ref 7–30)
CALCIUM SERPL-MCNC: 9.4 MG/DL (ref 8.5–10.1)
CHLORIDE BLD-SCNC: 105 MMOL/L (ref 94–109)
CO2 SERPL-SCNC: 26 MMOL/L (ref 20–32)
CREAT SERPL-MCNC: 0.92 MG/DL (ref 0.52–1.04)
DEPRECATED CALCIDIOL+CALCIFEROL SERPL-MC: 43 UG/L (ref 20–75)
ERYTHROCYTE [DISTWIDTH] IN BLOOD BY AUTOMATED COUNT: 12.4 % (ref 10–15)
FERRITIN SERPL-MCNC: 23 NG/ML (ref 8–252)
GFR SERPL CREATININE-BSD FRML MDRD: 77 ML/MIN/1.73M2
GLUCOSE BLD-MCNC: 112 MG/DL (ref 70–99)
HCT VFR BLD AUTO: 44.8 % (ref 35–47)
HGB BLD-MCNC: 15.3 G/DL (ref 11.7–15.7)
INSULIN SERPL-ACNC: 20.7 MU/L (ref 3–25)
MCH RBC QN AUTO: 30.3 PG (ref 26.5–33)
MCHC RBC AUTO-ENTMCNC: 34.2 G/DL (ref 31.5–36.5)
MCV RBC AUTO: 89 FL (ref 78–100)
PLATELET # BLD AUTO: 257 10E3/UL (ref 150–450)
POTASSIUM BLD-SCNC: 4.9 MMOL/L (ref 3.4–5.3)
PROT SERPL-MCNC: 7.5 G/DL (ref 6.8–8.8)
RBC # BLD AUTO: 5.05 10E6/UL (ref 3.8–5.2)
SODIUM SERPL-SCNC: 137 MMOL/L (ref 133–144)
T3FREE SERPL-MCNC: 2.5 PG/ML (ref 2.3–4.2)
T4 FREE SERPL-MCNC: 0.75 NG/DL (ref 0.76–1.46)
TSH SERPL DL<=0.005 MIU/L-ACNC: 3.19 MU/L (ref 0.4–4)
WBC # BLD AUTO: 6.8 10E3/UL (ref 4–11)

## 2022-03-25 PROCEDURE — 84439 ASSAY OF FREE THYROXINE: CPT | Performed by: STUDENT IN AN ORGANIZED HEALTH CARE EDUCATION/TRAINING PROGRAM

## 2022-03-25 PROCEDURE — 82306 VITAMIN D 25 HYDROXY: CPT | Performed by: STUDENT IN AN ORGANIZED HEALTH CARE EDUCATION/TRAINING PROGRAM

## 2022-03-25 PROCEDURE — 99214 OFFICE O/P EST MOD 30 MIN: CPT | Performed by: STUDENT IN AN ORGANIZED HEALTH CARE EDUCATION/TRAINING PROGRAM

## 2022-03-25 PROCEDURE — 86376 MICROSOMAL ANTIBODY EACH: CPT | Performed by: STUDENT IN AN ORGANIZED HEALTH CARE EDUCATION/TRAINING PROGRAM

## 2022-03-25 PROCEDURE — 36415 COLL VENOUS BLD VENIPUNCTURE: CPT | Performed by: STUDENT IN AN ORGANIZED HEALTH CARE EDUCATION/TRAINING PROGRAM

## 2022-03-25 PROCEDURE — 83525 ASSAY OF INSULIN: CPT | Performed by: STUDENT IN AN ORGANIZED HEALTH CARE EDUCATION/TRAINING PROGRAM

## 2022-03-25 PROCEDURE — 84481 FREE ASSAY (FT-3): CPT | Performed by: STUDENT IN AN ORGANIZED HEALTH CARE EDUCATION/TRAINING PROGRAM

## 2022-03-25 PROCEDURE — 84443 ASSAY THYROID STIM HORMONE: CPT | Performed by: STUDENT IN AN ORGANIZED HEALTH CARE EDUCATION/TRAINING PROGRAM

## 2022-03-25 PROCEDURE — 82728 ASSAY OF FERRITIN: CPT | Performed by: STUDENT IN AN ORGANIZED HEALTH CARE EDUCATION/TRAINING PROGRAM

## 2022-03-25 PROCEDURE — 85027 COMPLETE CBC AUTOMATED: CPT | Performed by: STUDENT IN AN ORGANIZED HEALTH CARE EDUCATION/TRAINING PROGRAM

## 2022-03-25 PROCEDURE — 80053 COMPREHEN METABOLIC PANEL: CPT | Performed by: STUDENT IN AN ORGANIZED HEALTH CARE EDUCATION/TRAINING PROGRAM

## 2022-03-25 NOTE — PATIENT INSTRUCTIONS
The tests we discussed are:  Iranian Complete - urine test for cortisol and sex hormones. https://[employer].Jamgo/info-Mozambican-complete/ ($250)  GI Map - https://www.[employer].Jamgo/tests/gi-map ($350)  Another test I commonly do is Organix from [employer], which looks at markers of insulin resistance and mitochondrial function which can cause fatigue and weight gain - ($299) https://www.[employer].D1G/product/organix-comprehensive-profile-metabolic-function-test-urine      1. Labs today. Will be in touch with your results.  2. Trial of gluten and dairy free x 6 weeks.  3. Consider Iranian and GI Map testing.  4. H pylori test.

## 2022-03-28 LAB — THYROPEROXIDASE AB SERPL-ACNC: <10 IU/ML

## 2022-03-28 NOTE — RESULT ENCOUNTER NOTE
Adrianna Altamirano,  Your results came back and TPO antibody done to check thyroid was normal. If you have any further concerns please do not hesitate to contact us by message, phone or making an appointment.  Have a good day   Dr Joshua AIKEN in PCP absence this week

## 2022-04-12 LAB — COLOGUARD-ABSTRACT: POSITIVE

## 2022-04-21 DIAGNOSIS — R19.5 POSITIVE COLORECTAL CANCER SCREENING USING COLOGUARD TEST: Primary | ICD-10-CM

## 2022-04-25 ENCOUNTER — TELEPHONE (OUTPATIENT)
Dept: GASTROENTEROLOGY | Facility: CLINIC | Age: 47
End: 2022-04-25
Payer: COMMERCIAL

## 2022-04-25 ENCOUNTER — HOSPITAL ENCOUNTER (OUTPATIENT)
Facility: AMBULATORY SURGERY CENTER | Age: 47
End: 2022-04-25
Attending: INTERNAL MEDICINE
Payer: COMMERCIAL

## 2022-04-25 NOTE — TELEPHONE ENCOUNTER
Screening Questions  BlueKIND OF PREP RedLOCATION [review exclusion criteria] GreenSEDATION TYPE  1. Have you had a positive covid test in the last 90 days? n     2. Do you have a legal guardian or medical Power of ?  Are you able to give consent for your medical care?y (Sedation review/consideration needed)    3. Are you active on mychart? y    4. What insurance is in the chart? BXBS     3.   Ordering/Referring Provider: Lavon    4. BMI 28.0 [BMI OVER 40-EXTENDED PREP]  If greater than 40 review exclusion criteria [PAC APPT IF @ [facility]]        5.  Respiratory Screening :  [If yes to any of the following HOSPITAL setting only]     Do you use daily home oxygen? n    Do you have mod to severe Obstructive Sleep Apnea? n  [OKAY @ [facility] SH PH RI]   Do you have Pulmonary Hypertension? n     Do you have UNCONTROLLED asthma? n        6.   Have you had a heart or lung transplant? n      7.   Are you currently on dialysis? n [ If yes, G-PREP & HOSPITAL setting only]     8.   Do you have chronic kidney disease? n [ If yes, G-PREP ]    9.   Have you had a stroke or Transient ischemic attack (TIA - aka  mini stroke ) within 6 months?  n (If yes, please review exclusion criteria)    10.   In the past 6 months, have you had any heart related issues including cardiomyopathy or heart attack? n           If yes, did it require cardiac stenting or other implantable device? n      11.   Do you have any implantable devices in your body (pacemaker, defib, LVAD)? n (If yes, please review exclusion criteria)    12.   Do you take nitroglycerin? n           If yes, how often? n  (if yes, HOSPITAL setting ONLY)    13.   Are you currently taking any blood thinners? y           [IF YES, INFORM PATIENT TO FOLLOW UP W/ ORDERING PROVIDER FOR BRIDGING INSTRUCTIONS]     14.   Do you have a diagnosis of diabetes? n   [ If yes, G-PREP ]    15.   [FEMALES] Are you currently pregnant?     If yes, how many weeks?     16.   Are you taking  any prescription pain medications on a routine schedule?  n  [ If yes, EXTENDED PREP.] [If yes, MAC]    17.   Do you have any chemical dependencies such as alcohol, street drugs, or methadone?  n [If yes, MAC]    18.   Do you have any history of post-traumatic stress syndrome, severe anxiety or history of psychosis?  n  [If yes, MAC]    19.   Do you transfer independently?  y    20.  On a regular basis do you go 3-5 days between bowel movements? n   [ If yes, EXTENDED PREP.]    21.   Preferred LOCAL Pharmacy for Pre Prescription      [facility] [employer]  [id number] [place] [facility] [address] [id number] [place], [place] - [address]      Scheduling Details      Caller : Zayra Altamirano  (Please ask for phone number if not scheduled by patient)    Type of Procedure Scheduled: Colonoscopy  Which Colonoscopy Prep was Sent?: Miralax  KHORUTS CF PATIENTS & GROEN'S PATIENTS NEEDS EXTENDED PREP  Surgeon: Rick  Date of Procedure: 5/27  Location: [facility] [place]      Sedation Type: S  Conscious Sedation- Needs  for 6 hours after the procedure  MAC/General-Needs  for 24 hours after procedure    Pre-op Required at [facility] [place], [place] and OR for MAC sedation: n  (advise patient they will need a pre-op prior to procedure -)      Informed patient they will need an adult  y  Cannot take any type of public or medical transportation alone    Pre-Procedure [facility] or Externally: y    Confirmed Nurse will call to complete assessment y    Additional comments:

## 2022-05-16 DIAGNOSIS — Z11.59 ENCOUNTER FOR SCREENING FOR OTHER VIRAL DISEASES: Primary | ICD-10-CM

## 2022-05-19 ENCOUNTER — TELEPHONE (OUTPATIENT)
Dept: GASTROENTEROLOGY | Facility: CLINIC | Age: 47
End: 2022-05-19

## 2022-05-19 ENCOUNTER — TELEPHONE (OUTPATIENT)
Dept: HEMATOLOGY | Facility: CLINIC | Age: 47
End: 2022-05-19
Payer: COMMERCIAL

## 2022-05-19 NOTE — TELEPHONE ENCOUNTER
Pre assessment questions completed for upcoming Colonoscopy procedure scheduled on 5/27/22    COVID test scheduled 5/24/22    Reviewed procedural arrival time 10:10am and facility location [facility] [place].    Designated  policy reviewed. Instructed to have someone stay 6 hours post procedure.     Procedure indication: Positive Cologuard    Bowel prep recommendation: Miralax    Prep instructions sent via K[employer]a.    Reviewed Colonoscopy prep instructions with patient. No fiber/iron supplements or foods that contain nuts/seeds 7 days prior to procedure.     Anticoagulation/blood thinners? Xarelto. Pt was advised to start hold on day before procedure by managing provider. Will send staff message to Miky Hanson PA-C     Electronic implanted devices? None    Patient verbalized understanding and had no questions or concerns at this time.    Yelena Williamson RN

## 2022-05-19 NOTE — TELEPHONE ENCOUNTER
[facility]  [facility]  [facility] [address], [address], [place] [address]  Main: [phone], Fax: [phone]    Telephone Note:    Patient: Zayra Altamirano  MRN: [id number]  : 1975  Date of this note written: May 19, 2022    Received a Epic staff message from [employer] group requesting that the patient hold her rivaroxaban for 48 hours prior to upcoming colonoscopy procedure on 2022. I am in agreement with this and called the patient on 2022 at 10:30am to confirm this plan.      Miky Hanson PA-C, MPAS  Physician Assistant  [facility].

## 2022-05-19 NOTE — TELEPHONE ENCOUNTER
Patient scheduled for Colonoscopy on 5/27/22.     Covid test scheduled: 5/24/22    Arrival time: 10:10am    Facility location: [facility] [place]    Sedation type: CS    Indication for procedure: Positive Cologuard     Anticoagulations? Xarelto Holding interval of 2 days -- Will instruct the Pt to contact the managing provider for holding guidance.     Bowel prep recommendation: Miralax    Prep instructions sent via Diurnal    Pre visit planning completed.    Yelena Williamson RN

## 2022-05-19 NOTE — TELEPHONE ENCOUNTER
Update on Xarelto:    Miky Hanson, DOMINIQUE Williamson, Yelena HARRY RN; P Endoscopy Nurse Pool  I called patient just now and told her to hold her rivaroxaban for 2 days prior to procedure.     Thanks     Miky       Nothing further needed, per staff message, the Pt has been informed.     Yelena Williamson RN   [facility]

## 2022-05-24 ENCOUNTER — LAB (OUTPATIENT)
Dept: LAB | Facility: CLINIC | Age: 47
End: 2022-05-24
Payer: COMMERCIAL

## 2022-05-24 DIAGNOSIS — Z11.59 ENCOUNTER FOR SCREENING FOR OTHER VIRAL DISEASES: ICD-10-CM

## 2022-05-24 LAB — SARS-COV-2 RNA RESP QL NAA+PROBE: POSITIVE

## 2022-05-24 PROCEDURE — U0003 INFECTIOUS AGENT DETECTION BY NUCLEIC ACID (DNA OR RNA); SEVERE ACUTE RESPIRATORY SYNDROME CORONAVIRUS 2 (SARS-COV-2) (CORONAVIRUS DISEASE [COVID-19]), AMPLIFIED PROBE TECHNIQUE, MAKING USE OF HIGH THROUGHPUT TECHNOLOGIES AS DESCRIBED BY CMS-2020-01-R: HCPCS | Mod: 90 | Performed by: PATHOLOGY

## 2022-05-24 PROCEDURE — U0005 INFEC AGEN DETEC AMPLI PROBE: HCPCS | Mod: 90 | Performed by: PATHOLOGY

## 2022-05-24 PROCEDURE — 99000 SPECIMEN HANDLING OFFICE-LAB: CPT | Performed by: PATHOLOGY

## 2022-05-25 ENCOUNTER — MYC MEDICAL ADVICE (OUTPATIENT)
Dept: FAMILY MEDICINE | Facility: CLINIC | Age: 47
End: 2022-05-25
Payer: COMMERCIAL

## 2022-05-25 ENCOUNTER — TELEPHONE (OUTPATIENT)
Dept: GASTROENTEROLOGY | Facility: CLINIC | Age: 47
End: 2022-05-25
Payer: COMMERCIAL

## 2022-05-25 NOTE — TELEPHONE ENCOUNTER
Outbound Call made to: Zayra Altamirano      Procedure: COLONOSCOPY     Date, Location, and Surgeon of Procedure Cancelled:   05/27/2022 - DANIEL GALLARDO       Reason for call (please be detailed, any staff messages or encounters to note?):     POSITIVE COVID TEST - 5/24/2022         Rescheduled:     YES      If rescheduled:    Date: 06/29/2022   [place] [facility] [place]    Note any change or update to original order/sedation:  CS TO MAC -- PER AVAILABILITY

## 2022-06-08 ENCOUNTER — VIRTUAL VISIT (OUTPATIENT)
Dept: PSYCHOLOGY | Facility: CLINIC | Age: 47
End: 2022-06-08
Attending: NURSE PRACTITIONER
Payer: COMMERCIAL

## 2022-06-08 DIAGNOSIS — F41.9 ANXIETY: ICD-10-CM

## 2022-06-08 DIAGNOSIS — R41.840 CONCENTRATION DEFICIT: Primary | ICD-10-CM

## 2022-06-08 PROCEDURE — 90791 PSYCH DIAGNOSTIC EVALUATION: CPT | Mod: 95 | Performed by: PSYCHOLOGIST

## 2022-06-08 ASSESSMENT — ANXIETY QUESTIONNAIRES
7. FEELING AFRAID AS IF SOMETHING AWFUL MIGHT HAPPEN: NOT AT ALL
5. BEING SO RESTLESS THAT IT IS HARD TO SIT STILL: NOT AT ALL
GAD7 TOTAL SCORE: 0
1. FEELING NERVOUS, ANXIOUS, OR ON EDGE: NOT AT ALL
6. BECOMING EASILY ANNOYED OR IRRITABLE: NOT AT ALL
3. WORRYING TOO MUCH ABOUT DIFFERENT THINGS: NOT AT ALL
GAD7 TOTAL SCORE: 0
2. NOT BEING ABLE TO STOP OR CONTROL WORRYING: NOT AT ALL

## 2022-06-08 ASSESSMENT — PATIENT HEALTH QUESTIONNAIRE - PHQ9
SUM OF ALL RESPONSES TO PHQ QUESTIONS 1-9: 3
5. POOR APPETITE OR OVEREATING: NOT AT ALL

## 2022-06-08 ASSESSMENT — COLUMBIA-SUICIDE SEVERITY RATING SCALE - C-SSRS
ATTEMPT LIFETIME: NO
TOTAL  NUMBER OF ABORTED OR SELF INTERRUPTED ATTEMPTS LIFETIME: NO
TOTAL  NUMBER OF INTERRUPTED ATTEMPTS LIFETIME: NO
6. HAVE YOU EVER DONE ANYTHING, STARTED TO DO ANYTHING, OR PREPARED TO DO ANYTHING TO END YOUR LIFE?: NO
2. HAVE YOU ACTUALLY HAD ANY THOUGHTS OF KILLING YOURSELF?: NO
1. HAVE YOU WISHED YOU WERE DEAD OR WISHED YOU COULD GO TO SLEEP AND NOT WAKE UP?: NO

## 2022-06-08 NOTE — PROGRESS NOTES
[facility]  Provider Name:  Dianne Lombardo     Credentials:  PhD, LP    PATIENT'S NAME: Zayra Altamirano  PREFERRED NAME: Zayra  PRONOUNS: She/Her  MRN: [id number]  : 1975  ADDRESS: [address] [address]  ACCT. NUMBER:  [id number]  DATE OF SERVICE: 22  START TIME: 1:00  END TIME: 1:52  PREFERRED PHONE: [phone]  May we leave a program related message: Yes  SERVICE MODALITY:  Video Visit:      Provider verified identity through the following two step process.  Patient provided:  Patient     Telemedicine Visit: The patient's condition can be safely assessed and treated via synchronous audio and visual telemedicine encounter.      Reason for Telemedicine Visit: Services only offered telehealth    Originating Site (Patient Location): Patient's home    Distant Site (Provider Location): Provider Remote Setting- Home Office    Consent:  The patient/guardian has verbally consented to: the potential risks and benefits of telemedicine (video visit) versus in person care; bill my insurance or make self-payment for services provided; and responsibility for payment of non-covered services.     Patient would like the video invitation sent by:  My Chart    Mode of Communication:  Video Conference via [employer]    As the provider I attest to compliance with applicable laws and regulations related to telemedicine.    UNIVERSAL ADULT Mental Health DIAGNOSTIC ASSESSMENT    Identifying Information:  Patient is a 46 year old,   female.  The pronoun use throughout this assessment reflects the patient's chosen pronoun.  Patient was referred for an assessment by referring provider.  Patient attended the session alone.    Chief Complaint:   The purpose of this evaluation is to: provide treatment recommendations and clarify diagnosis. Patient reported seeking services at this time for diagnostic assessment and recommendations for treatment.  Patient reported that she has  "not completed a previous ADHD diagnostic assessment.  Patient has not received a previous diagnosis of ADHD. Patient reported that medication has not been prescribed medication to address these problems. She has difficulty completing tasks (e.g., putting off doing yard work) she doesn't do things that aren't enjoyable.    Social/Family History:  Patient reported they grew up in  [place], [place]. . They lived in a small town ([place], [place]) and moved to [place] when she was 9 years old. They were raised by biological mother; grandmother; grandfather  .  Parents  /  when Client was an infant. Her parents were having issues before Client was born. When Client was born, her mother went straight from the hospital and went home to her parents' (Client's grandparents') home. She saw her father occasionally (he remarried and they'd see them on holidays). Client was the first born of two children. She has an older brother. She has five half-sisters (the next oldest is 7 years younger than Client). They didn't really see them as siblings; Client felt more like a \".\" There were different expectations for Client and her brother than her half-siblings. These siblings were raised \"middle class\" and Client and her brother lived in \"poverty\" as they grew up in a trailer. She stated, \"I didn't feel jealous but there was different treatment.\"  Patient reported that their childhood was \"okay for the most part.\" They grew up \"poor but never uneducated.\" Her mom was \"super loving and loved unconditionally.\" Client loved her grandparents and spent a lot of time with them (her mother was a single, working parent. Her grandparents were \"stable; like my rock.\"  She didn't feel she had a loving relationship with her father.  She had a hard time relating to other kids her age. She had a few friends. She was tall at a young age (taller than some teachers) and developed physically early so she felt she stood out and " "didn't know how to relate to people. She recalls having a had time in 5th grade.  It was hard to relate to people her own age. Patient described their current relationships with family of origin as: Client cared for her mother in her last few years of life (she passed in spring 2021). She is in contact with her father and sees him a few times per year. He feels like a \"relative\" but not like a father to her.    The patient describes their cultural background as American.  Cultural influences and impact on patient's life structure, values, norms, and healthcare: NA.  Contextual influences on patient's health include: Health- Seeking Factors has had medical trauma; health anxiety; Client is a practicing Unitarian (respect everything people believe).  Her father was Pentecostalism. These factors will be addressed in the Preliminary Treatment plan. Patient identified their preferred language to be English. Patient reported they does not need the assistance of an  or other support involved in therapy.     Patient reported had no significant delays in developmental tasks.  Patient's highest education level was college graduate (double major in studio art and graphic design). She also took extra courses to get a BS in English (turned minor into English). She graduated with honors the first time around.  Patient identified the following learning problems: none reported. She always scored well on tests. She started reading at age 2 and remembers reading in . She couldn't make herself do homework.  She could get some work done in class. If she wasn't interested in the topic (e.g., math), she wouldn't do the homework.  She didn't have any special services or accommodations. She couldn't get her homework done. She couldn't get homework done until college.  Modifications will not be used to assist communication in therapy. Patient reports they are  able to understand written materials.    Patient reported the " following relationship history: one marriage. Patient's current relationship status is  for 17 years.  Patient identified their sexual orientation as bisexual.  Patient reported have 2 children: a 10 year old son and a 14 year old daughter. Patient identified spouse, friends as part of their support system.  Patient identified the quality of these relationships as good.    Patient's current living/housing situation involves living in her own home.  The immediate members of family and household include  and two kids and they report that housing is stable.    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  Patient reports their finances are obtained through spouse. Patient does not identify finances as a current stressor.  She was looking at going back into the work force in 2017 when her youngest child started , but that year her mother had a stroke and she became a caregiver for her until she passed in 2021. She doesn't plan to work over the summer, but is thinking that she might like to help people with end-of-life care (She might want to volunteer in hospice care this fall).    Patient reported that they have not been involved with the legal system.  Patient does not report being under probation/ parole/ jurisdiction. They are not under any current court jurisdiction. .    Patient's Strengths and Limitations:  Patient identified the following strengths or resources that will help them succeed in treatment: commitment to health and well being, friends / good social support, family support, insight, intelligence, motivation, sense of humor and strong social skills. Things that may interfere with the patient's success in treatment include: none identified.     Assessments:  The following assessments were completed by patient for this visit:  PHQ9:   PHQ-9 SCORE 2/24/2015 6/8/2022   PHQ-9 Total Score 0 -   PHQ-9 Total Score - 3     GAD7:   ANDRE-7 SCORE 6/8/2022   Total Score 0     [place]  "Suicide Severity Rating Scale (Lifetime/Recent)  [place] Suicide Severity Rating (Lifetime/Recent) 6/8/2022   1. Wish to be Dead (Lifetime) 0   2. Non-Specific Active Suicidal Thoughts (Lifetime) 0   Actual Attempt (Lifetime) 0   Has subject engaged in non-suicidal self-injurious behavior? (Lifetime) 0   Interrupted Attempts (Lifetime) 0   Aborted or Self-Interrupted Attempt (Lifetime) 0   Preparatory Acts or Behavior (Lifetime) 0   Calculated C-SSRS Risk Score (Lifetime/Recent) No Risk Indicated         Personal and Family Medical History:  Patient did not report a family history of mental health concerns.  Patient reports family history includes Blood Disease in her brother, sister, and sister; Breast Cancer in her maternal aunt, maternal aunt, and maternal grandmother; Breast Cancer (age of onset: 63) in her mother; Cancer in her maternal grandfather; Cardiovascular in her maternal grandmother; Cataracts in her father; Cerebrovascular Disease in her maternal grandmother and mother; Circulatory in her father; Diabetes in her maternal grandmother; Hypertension in her father, maternal grandmother, and mother; Lipids in her father; Unknown/Adopted in her father..     Patient does report Mental Health Diagnosis and/or Treatment. Patient reported the following previous diagnoses which includes: an Anxiety Disorder (\"health anxiety\"). She noted that she was \"passively\" diagnosed with this. She had a bilateral pulmonary embolisms in 2005 (and was in the hospital for a week) and that was as \"big surprise.\" She was young and healthy and running marathons but she was misdiagnosed twice (she went to urgent care and her concerns were dismissed). After this, she became nervous that she or medical professionals would \"miss things\" if something was wrong.  Patient reported symptoms began in 2005.   Patient has not received mental health services in the past: none reported. A nurse practitioner encouraged her to do therapy in " "the past, but she didn't follow up with the referral (she realized on her own that taking apple cider vinegar regularly was impacting her esophagus so she was having spasms in her throat due to this, so she decided she didn't need therapy for this). Psychiatric Hospitalizations: None.  Patient denies a history of civil commitment.  Patient is not receiving other mental health services. These include none. She has had the sensations of racing heart beat and difficulty breathing (panic/worry and sensations similar to the pulmonary embolism). These symptoms have improved and starting Xarelto (blood thinner) has been beneficial for this as well. She feels this medication is more \"dependable\" and isnnt' impacted by what she is eating or other factors. She feels more \"even keeled\" but still worries that there might be things that are going on in her body. She worries about certain sensations she experiences, but she doesn't feel it impacts her often (perhaps once per month or once every few months that will trigger worry, but she can talk herself down from this concern and \"gives it a little bit of time\" to pass). It doesn't impair her ability to engage in her life.   She has to have a colonoscopy but in order to schedule, she had to take a COVID-19 test and was positive (asymptomatic) so this was rescheduled for the end of the month. She is anxiously anticipating this procedure because she wants to learn more about her health and make sure she is okay. There is some worry around this. Her mother  of cancer spring 2021. Her aunts and grandmother were also diagnosed with breast cancer; Client is considering a preventative double mastectomy. She has been thinking about this as she ages, but \"it isn't weighing heavily on my mind.\"      Patient has had a physical exam to rule out medical causes for current symptoms.  Date of last physical exam was within the past year. Client was encouraged to follow up with PCP if " symptoms were to develop. The patient has a [place] Primary [facility] Provider, who is named Jaquelin Duarte.. Patient reports the following current medical concerns: taking blood thinners (Factor V Liden).  She has to have a colonoscopy but in order to schedule, she had to take a COVID-19 test and was positive (asymptomatic) so this was rescheduled for the end of the month. She is anxiously anticipating this procedure because she wants to learn more about her health and make sure she is okay. There is some worry around this. Patient denies any issues with pain..  There are not significant appetite / nutritional concerns / weight changes.   Patient does not report a history of head injury / trauma / cognitive impairment.      Patient reports current meds as:   Outpatient Medications Marked as Taking for the 6/8/22 encounter (Virtual Visit) with Dianne Lombardo, PhD   Medication Sig     Acetaminophen (TYLENOL PO) Take 500 mg by mouth     rivaroxaban ANTICOAGULANT (XARELTO ANTICOAGULANT) 10 MG TABS tablet Take 1 tablet (10 mg) by mouth daily (with dinner) TAKE 1 TABLET BY MOUTH EVERY DAY WITH DINNER     VITAMIN D, CHOLECALCIFEROL, PO Take 1,000 Units by mouth daily       Medication Adherence:  Patient reports  .  taking prescribed medications as prescribed.    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  change goal INR 1.3-2.0     Rosacea          Current Mental Status Exam:   Appearance:  Appropriate    Eye Contact:  Good   Psychomotor:  Normal       Gait / station:  no problem  Attitude / Demeanor: Cooperative   Speech      Rate / Production: Hyperverbal       Volume:  Normal  volume      Language:  intact, no problems and good  Mood:   Normal  Affect:   Appropriate    Thought Content: Clear   Thought Process: Goal Directed  Logical       Associations: No loosening of associations  Insight:   Good   Judgment:  Intact   Orientation:  All  Attention/concentration: Good      Substance Use:  Patient did report a family history of substance use concerns; see medical history section for details.  Patient has not received chemical dependency treatment in the past.  Patient has not ever been to detox.      Patient is not currently receiving any chemical dependency treatment. Patient reported the following problems as a result of their substance use:  none reported.    Patient reports using alcohol 1 times per month and has 1 beers and glasses of wine at a time. Patient first started drinking at age 21.  Patient reported date of last use was weeks ago.  Patient reports heaviest use is current use.  Patient denies using tobacco.  Patient denies using cannabis.  Patient reports using caffeine 1 times per week and drinks 1 at a time. Patient started using caffeine at age 44. She prefers decaf coffee; it might make it hard for her to sleep. It makes her hands shake and her heart race.  Patient reports using/abusing the following substances. Patient reported no other substance use.     Substance Use: No symptoms    Based on the negative CAGE score and clinical interview there  are not indications of drug or alcohol  abuse.      Significant Losses / Trauma / Abuse / Neglect Issues:   Patient did not  serve in the .  There are indications or report of significant loss, trauma, abuse or neglect issues related to: major medical problems (bilateral pulmonary embolisms in ); death of grandfather; mother  of cancer in spring 2021  Concerns for possible neglect are not present.     Safety Assessment:   Patient denies current homicidal ideation and behaviors.  Patient denies current self-injurious ideation and behaviors.    Patient denied risk behaviors associated with substance use.  Patient denies any high risk behaviors associated with mental health symptoms.  Patient reports the following current concerns for their personal safety: None.  Patient reports there are not firearms in the house.       History of Safety Concerns:  Patient denied a history of homicidal ideation.     Patient denied a history of personal safety concerns.    Patient denied a history of assaultive behaviors.    Patient denied a history of sexual assault behaviors.     Patient denied a history of risk behaviors associated with substance use.  Patient denies any history of high risk behaviors associated with mental health symptoms.    Risk Plan:  See Recommendations for Safety and Risk Management Plan    Review of Symptoms per patient report:  Depression: Change in sleep, Change in energy level and Change in appetite  Deborah:  No Symptoms  Psychosis: No Symptoms  Anxiety: nervousness and worry  Panic:  No symptoms  Post Traumatic Stress Disorder:  No Symptoms   Eating Disorder: No Symptoms  ADD / ADHD:  Poor task completion  Conduct Disorder: No symptoms  Autism Spectrum Disorder: No symptoms  Obsessive Compulsive Disorder: No symmptoms    Patient reports the following compulsive behaviors and treatment history: none reported    Diagnostic Criteria:   Unspecified Anxiety Disorder  The client does not report enough symptoms for the full criteria of  any specific Anxiety Disorder to have been met   - illness anxiety         Functional Status:  Patient reports the following functional impairments:  home life with family.         Clinical Summary:  1. Reason for assessment: ADHD Evaluation  .  2. Psychosocial, Cultural and Contextual Factors: history of medical trauma; stay at home mother;  with 2 kids.  3. Principal DSM5 Diagnoses  (Sustained by DSM5 Criteria Listed Above):   300.09 (F41.8) Other Specified Anxiety Disorder .  RULE OUT: ADHD  6. Prognosis: Expect Improvement and Maintain Current Status / Prevent Deterioration.  7. Likely consequences of symptoms if not treated: issues at home.  8. Client strengths include:  creative, educated, empathetic, goal-focused, good listener, insightful, intelligent, motivated, open to learning, open to suggestions / feedback, responsible parent, support of family, friends and providers, supportive, wants to learn, willing to ask questions and willing to relate to others .     Recommendations:     1. Plan for Safety and Risk Management:   Recommended that patient call 911 or go to the local ED should there be a change in any of these risk factors..  Report to child / adult protection services was NA.      4. Resources/Service Plan:    services are not indicated.   Modifications to assist communication are not indicated.   Additional disability accommodations are not indicated.      5. Collaboration:   Collaboration / coordination of treatment will be initiated with the following  support professionals: primary care physician.      6.  Referrals:   The following referrals will be initiated: NA. Next Scheduled Appointment: NA.     A Release of Information has been obtained for the following: primary care physician.    7. YANDEL:    YANDEL:  Discussed the general effects of drugs and alcohol on health and well-being. Provider gave patient printed information about the effects of chemical use on their health and  well being. Recommendations:  NA .     8. Records:   These were reviewed at time of assessment.   Information in this assessment was obtained from the medical record and  provided by patient who is a good historian.    Patient will have open access to their mental health medical record.      Provider Name/ Credentials:  Dianne Lombardo, PhD,   June 8, 2022

## 2022-06-14 ENCOUNTER — DOCUMENTATION ONLY (OUTPATIENT)
Dept: PSYCHOLOGY | Facility: CLINIC | Age: 47
End: 2022-06-14
Payer: COMMERCIAL

## 2022-06-14 ENCOUNTER — VIRTUAL VISIT (OUTPATIENT)
Dept: PSYCHOLOGY | Facility: CLINIC | Age: 47
End: 2022-06-14
Attending: NURSE PRACTITIONER
Payer: COMMERCIAL

## 2022-06-14 DIAGNOSIS — F41.9 ANXIETY: Primary | ICD-10-CM

## 2022-06-14 PROCEDURE — 90834 PSYTX W PT 45 MINUTES: CPT | Mod: 95 | Performed by: PSYCHOLOGIST

## 2022-06-14 NOTE — PROGRESS NOTES
Client Name: Zayra Altamirano   MRN: [id number]  : 1975    Client completed the [place] Multiphasic Personality Inventory-2 (MMPI-2), a self-report personality inventory, as part of her evaluation. Validity scales indicate that the client responded in a manner suggesting underreporting or a need to deny problems and weaknesses. Client s responses suggest that she is reporting low levels of general emotional distress. Individuals with similar profiles tend to generally see themselves as well adjusted and as sufficiently able to cope with the difficulties they face that they feel little or no need to draw attention to them.

## 2022-06-14 NOTE — PROGRESS NOTES
"Progress Note     Client Name:  Zayra Altamirano Date: 6/14/2022         Service Type: Individual    Session Start Time: 9:00  Session End Time: 9:49     Session Length: 49 minutes    Session #: 2     Attendees: Client attended alone      Telemedicine Visit: The patient's condition can be safely assessed and treated via synchronous audio and visual telemedicine encounter.      Reason for Telemedicine Visit: Services only offered telehealth    Originating Site (Patient Location): Patient's home    Distant Site (Provider Location): Provider Remote Setting- Home Office    Consent:  The patient/guardian has verbally consented to: the potential risks and benefits of telemedicine (video visit) versus in person care; bill my insurance or make self-payment for services provided; and responsibility for payment of non-covered services.     Mode of Communication:  Video Conference via CLEAR    As the provider I attest to compliance with applicable laws and regulations related to telemedicine.       Intervention:  reviewed strategies for managing anxiety; motivational interviewing: explored potential barriers for making healthy changes    Identifying Information:  Client is a 46 year old, ,  female. Client was referred for a diagnostic assessment by PCP.  The purpose of this evaluation is to: evaluate current cognitive functioning.  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 Client attended the session alone.       Client's Statement of Presenting Concern:  Client reported seeking services at this time for diagnostic assessment and recommendations for treatment. Client's presenting concerns include: \"poor executive functioning.\" Client explained that she hasn't been impulsive or hyper, but she can't get things done. For instance, she struggles to complete thank you notes (she could write it, address them, but somehow \"stall out\" and never put them in the mail). This bothers her because she wants to be able to " "follow through. Client will mentally intend to do something but then she doesn't execute the plan. Sometimes making a list is helpful. She struggles to be organized and make lists. She has difficulty completing tasks (e.g., putting off doing yard work) she doesn't do things that aren't enjoyable. She might spend too much at the grocery store and buy things that aren't on the list; she doesn't follow a meal plan or a list but she will grab things that weren't on there). She is not organized. She can't keep her house clean (it will stay tidy for a while but then it will get messy again). Her drawers and closets are cluttered. Client doesn't follow a cleaning schedule. Client can hyper-focus when she is reading and lose track of time. Client has difficulty completing tasks. She will start a chore and then get sidetracked by something else. She jumps from one thing to the next without finishing tasks. She will procrastinate on things that she isn't interested in doing. Client reported that she is often a person who is scrambling and \"running around and rushing\" and doing things at the last minute (e.g., when buying and wrapping Demetrius presents). Client reported she can think of organizational systems but she can't execute this routine/structure in her life. Client has difficulty listening in conversations. She feels she is too talkative and gives more detail than necessary. She feels she has a hard time giving brief answers and might over elaborate. She has always noticed this about herself. Client stated that symptoms have resulted in the following functional impairments: home life with family.      History of Presenting Concern:  Client reported that she has not completed a previous ADHD diagnostic assessment.  Client has not received a previous diagnosis of ADHD.  Client reported that medication has not been prescribed medication to address these problems. Client reported that these problem(s) began in childhood. " "Client has not attempted to resolve these concerns in the past. Client reported that other professionals are not involved in providing support / services.       Social History:  As a child, client reported that she failed to complete assigned chores in the home environment, had problems getting ready for school in the morning, had problems with organization and keeping track of items. Client's parents didn't remind her to do her homework; she was independent with this. If people tried to encourage her to do her homework, she struggled (e.g., she remembered her mother and grandmother were sitting at the kitchen table with her telling her to just start writing a report on wolves and she felt she couldn't do it). Client became more organized with her bedroom in [employer]. Client reported difficulty with childhood peer relationships. Client felt bullied in grade school (on the bus); she was also shy/\"self conscious.\" Client noted that, by 7th grade, \"I hit my stride with my friends.\" Client liked attending the [employer] school \"because I could find my group of weirdos.\" As a child, client reported having regular and consistent sleep patterns.  She didn't feel tired at night and often stayed up late reading. Client reported currently experiencing regular and consistent sleep patterns.  Client reported sleeping approximately 6 hours per night. She feels like she is a light sleeper\" since becoming a parent. If she is awakened during the night, it is hard for her to fall back to sleep. She may decide to read and then will find she is reading for a long time. Client reported that she has not completed a sleep study.  Client reported having a well-balanced diet.  There are not significant nutritional concerns.  Client reported sporadic exercise patterns.    Client's highest education level was college graduate (double major in studio art and graphic design). She also took extra courses to get a BS in English (turned minor " "into English). She graduated with honors the first time around. Client graduated high school in 1992. She went to a [employer] high school for 11th and 12th grade and lived in the dorms there. She noted that this program was \"outcome based\" and instead of getting letter grades they were given \"percentages of our understanding of things so you could demonstrate in different ways how you understood the material.\" This was possible in conversations with teachers and not just in homework completion. This was \"okay but didn't motivate me to do better.\" It was like \"pulling teeth\" to get things done. During the elementary, middle, and high school years, patient recalls academic strengths in the area of reading, writing and art. Client reported experiencing academic problems in math and social studies. Client did not identify any learning problems. Client did not receive tutoring services during the school years. Client did not receive special education services. Client reported failure to finish or complete homework. Client had a had time paying attention but was not disruptive. She might have been \"chatty\" at times. People asked her why she didn't work up to her potential. She could listen in class but she wasn't able to do the homework. She always scored well on tests. She started reading at age [age] and remembers reading in . She couldn't make herself do homework.  She could get some work done in class. If she wasn't interested in the topic (e.g., math), she wouldn't do the homework. In third grade, she remembered just leaving her assignments in her desk and not bringing them home. She stated, \"It wasn't that I didn't want to, I just couldn't do it.\" She remembered a teacher getting upset/frustrated with her over this. Client felt that she had to have a really strong connection with a teacher and want them to hold her in \"high regard\" to be able to do her work. She also had to connect with the material " "(e.g., reading or writing poetry). She could hyper-focus on reading. If she was reading and someone tried to talk to her, she couldn't hear them. She didn't have any special services or accommodations. She couldn't get her homework done. Client noted that if she did do homework, she would do her work the night before. She couldn't get homework done until college. Client often doodled in class. She wouldn't be able to do rough drafts of work, she just had to do it at the last minute. Client did attend post-secondary school.  Client graduated from college in 1998 (from [id number])  with a degree in studio art, graphic design. She went back to college after getting  she went back and got a second degree in English. She would write papers the night before they were due. She often procrastinated. Client felt high school didn't prepare her for college. There were a few classes she failed; there were some classes she didn't go to because it wasn't what she thought it was going to be or she felt scared that she wasn't smart enough to grasp it (e.g., science lab classes). She learned how to use different techniques to manage her work. She stayed up all night to finish work.     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 Patient reports their finances are obtained through spouse. Patient does not identify finances as a current stressor.  She was looking at going back into the work force in 2017 when her youngest child started , but that year her mother had a stroke and she became a caregiver for her until she passed in 2021. She doesn't plan to work over the summer, but is thinking that she might like to help people with end-of-life care (She might want to volunteer in hospice care this fall). Client reported that she is not currently employed.  The client's work history includes:  during college (this was \"very fun and I liked that it was busy and in the moment\"); part-time job at 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 She did her piece of work and then passed it on to the next person. She liked concentrating on something for the short-term and then being able to \"be done with it.\" Client did not return to work after maternity leave (complications from ,  plan fell through and then contract workers were all laid off at [employer] and she became a stay at home mother). Client explained that jobs she has enjoyed the most are those that involve helping people, not having long deadlines, and that are interactive. Client does not like sales-related jobs. Client has liked interviewing people for positions and helping to build departments, but didn't like making cold-calls to see what staffing needs were for different businesses. She disliked having to look through her files and then following up. She has often not felt very successful at jobs that have a lot of deadlines. 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  Client has not been terminated from a place of employment. Client has been \"let go\" and \"laid off\" from a few jobs, but not \"fired.\"       Risk Taking Behaviors:  Client reported no history of risk taking behaviors "       Motor Vehicle Operation:  Client has received a 's license.  Client has not received any moving violations.  Client reported the following driving habits: attentive and cautious.  According to client, other people are comfortable riding as a passenger when she is driving.        Mental Status Assessment:  Appearance:   Appropriate   Eye Contact:   Good   Psychomotor Behavior: Normal   Attitude:   Cooperative   Orientation:   All  Speech   Rate / Production: Normal    Volume:  Normal   Mood:    Normal  Affect:    Appropriate   Thought Content:  Clear   Thought Form:  Coherent  Logical   Insight:    Good       Review of Symptoms:  Depression:     Change in sleep, Change in energy level and Change in appetite  Deborah:             No Symptoms  Psychosis:       No Symptoms  Anxiety:           nervousness and worry  Panic:              No symptoms  Post Traumatic Stress Disorder:  No Symptoms   Eating Disorder:          No Symptoms  ADD / ADHD:              Poor task completion  Conduct Disorder:       No symptoms  Autism Spectrum Disorder:     No symptoms  Obsessive Compulsive Disorder:       No symmptoms  Reckless Behavior: No Symptoms      Safety Issues and Plan for Safety and Risk Management:  Client denies a history of suicidal ideation, suicide attempts, self-injurious behavior, homicidal ideation, homicidal behavior and and other safety concerns    Client denies current fears or concerns for personal safety.  Client denies current or recent suicidal ideation or behaviors.  Client denies current or recent homicidal ideation or behaviors.  Client denies current or recent self injurious behavior or ideation.  Client denies other safety concerns.  Client reports there are no firearms in the house.  Recommended that patient call 911 or go to the local ED should there be a change in any of these risk factors.        Diagnostic Criteria:  Attention Deficit Hyperactivity Disorder  A) A persistent pattern of  inattention and/or hyperactivity-impulsivity that interferes with functioning or development, as characterized by (1) Inattention and/or (2) Hyperactivity and Impulsivity  - Often fails to give close attention to details or makes careless mistakes in schoolwork, at work, or during other activities  - Often has difficulty sustaining attention in tasks or play activities  - Often has difficulty organizing tasks and activities  - Often avoids, dislikes, or is reluctant to engage in tasks that require sustained mental effort  - Is often easily distractedby extraneous stimuli  - Is often forgetful in daily activities    Unspecified Anxiety Disorder  The client does not report enough symptoms for the full criteria of any specific Anxiety Disorder to have been met   - illness anxiety        Functional Status:  Client's symptoms have caused reduced functional status in the following areas: home life with family      DSM-5Diagnoses: (Sustained by DSM5 Criteria Listed Above)    300.09 (F41.8) Other Specified Anxiety Disorder     RULE OUT: ADHD    Attendance Agreement:  Client has signed Attendance Agreement:No: unable to sign via telehealth      Preliminary Plan:  The client reports no currently identified Scientologist, ethnic or cultural issues relevant to therapy.     services are not indicated.    Modifications to assist communication are not indicated.    Collaboration / coordination of treatment will be initiated with the following support professionals: primary care physician.    Referral to another professional/service is not indicated at this time..    A Release of Information is not needed at this time.    Client was given self and collaborative rating scales to be completed prior to the next appointment.  Client consented to sending/receiving these measures via email. Depression and anxiety rating scales were completed.  A third appointment was not scheduled at this time.     Report to child / adult  protection services was NA.    Patient will have open access to their mental health medical record.    Dianne Lombardo, PhD, LP  June 14, 2022

## 2022-06-20 ENCOUNTER — TELEPHONE (OUTPATIENT)
Dept: GASTROENTEROLOGY | Facility: CLINIC | Age: 47
End: 2022-06-20

## 2022-06-20 NOTE — TELEPHONE ENCOUNTER
Pre assessment questions completed for upcoming colonscopy procedure scheduled on 6.29.22.    Positive COVID test 5.24.22.    Reviewed procedural arrival time 0940 and facility location [facility].    Designated  policy reviewed. Instructed to have someone stay 24 hours post procedure.     Anticoagulation/blood thinners? Xarelto. Patient states will hold 2 days prior as per her direction from her prescribing provder.     Electronic implanted devices? No    Reviewed Miralax/Magnesium citrate/Dulcolax prep instructions with patient. No fiber/iron supplements or foods that contain nuts/seeds prior to procedure.     Patient verbalized understanding and had no questions or concerns at this time.    July Sheppard RN

## 2022-06-29 ENCOUNTER — ANESTHESIA (OUTPATIENT)
Dept: SURGERY | Facility: AMBULATORY SURGERY CENTER | Age: 47
End: 2022-06-29
Payer: COMMERCIAL

## 2022-06-29 ENCOUNTER — ANESTHESIA EVENT (OUTPATIENT)
Dept: SURGERY | Facility: AMBULATORY SURGERY CENTER | Age: 47
End: 2022-06-29
Payer: COMMERCIAL

## 2022-06-29 ENCOUNTER — HOSPITAL ENCOUNTER (OUTPATIENT)
Facility: AMBULATORY SURGERY CENTER | Age: 47
Discharge: HOME | End: 2022-06-29
Attending: INTERNAL MEDICINE
Payer: COMMERCIAL

## 2022-06-29 VITALS
RESPIRATION RATE: 16 BRPM | WEIGHT: 195 LBS | TEMPERATURE: 97.8 F | HEART RATE: 72 BPM | DIASTOLIC BLOOD PRESSURE: 80 MMHG | BODY MASS INDEX: 27.92 KG/M2 | HEIGHT: 70 IN | OXYGEN SATURATION: 100 % | SYSTOLIC BLOOD PRESSURE: 122 MMHG

## 2022-06-29 VITALS — HEART RATE: 78 BPM

## 2022-06-29 DIAGNOSIS — R19.5 POSITIVE COLORECTAL CANCER SCREENING USING COLOGUARD TEST: Primary | ICD-10-CM

## 2022-06-29 LAB — COLONOSCOPY: NORMAL

## 2022-06-29 PROCEDURE — 88305 TISSUE EXAM BY PATHOLOGIST: CPT | Mod: TC | Performed by: INTERNAL MEDICINE

## 2022-06-29 PROCEDURE — 45380 COLONOSCOPY AND BIOPSY: CPT | Mod: 59

## 2022-06-29 PROCEDURE — 45390 COLONOSCOPY W/RESECTION: CPT

## 2022-06-29 PROCEDURE — 88305 TISSUE EXAM BY PATHOLOGIST: CPT | Mod: 26 | Performed by: PATHOLOGY

## 2022-06-29 RX ORDER — PROCHLORPERAZINE MALEATE 10 MG
10 TABLET ORAL EVERY 6 HOURS PRN
Status: DISCONTINUED | OUTPATIENT
Start: 2022-06-29 | End: 2022-06-30 | Stop reason: HOSPADM

## 2022-06-29 RX ORDER — NALOXONE HYDROCHLORIDE 0.4 MG/ML
0.4 INJECTION, SOLUTION INTRAMUSCULAR; INTRAVENOUS; SUBCUTANEOUS
Status: DISCONTINUED | OUTPATIENT
Start: 2022-06-29 | End: 2022-06-30 | Stop reason: HOSPADM

## 2022-06-29 RX ORDER — NALOXONE HYDROCHLORIDE 0.4 MG/ML
0.2 INJECTION, SOLUTION INTRAMUSCULAR; INTRAVENOUS; SUBCUTANEOUS
Status: DISCONTINUED | OUTPATIENT
Start: 2022-06-29 | End: 2022-06-30 | Stop reason: HOSPADM

## 2022-06-29 RX ORDER — PROPOFOL 10 MG/ML
INJECTION, EMULSION INTRAVENOUS PRN
Status: DISCONTINUED | OUTPATIENT
Start: 2022-06-29 | End: 2022-06-29

## 2022-06-29 RX ORDER — ONDANSETRON 2 MG/ML
4 INJECTION INTRAMUSCULAR; INTRAVENOUS EVERY 6 HOURS PRN
Status: DISCONTINUED | OUTPATIENT
Start: 2022-06-29 | End: 2022-06-30 | Stop reason: HOSPADM

## 2022-06-29 RX ORDER — PROPOFOL 10 MG/ML
INJECTION, EMULSION INTRAVENOUS CONTINUOUS PRN
Status: DISCONTINUED | OUTPATIENT
Start: 2022-06-29 | End: 2022-06-29

## 2022-06-29 RX ORDER — FLUMAZENIL 0.1 MG/ML
0.2 INJECTION, SOLUTION INTRAVENOUS
Status: ACTIVE | OUTPATIENT
Start: 2022-06-29 | End: 2022-06-29

## 2022-06-29 RX ORDER — ONDANSETRON 2 MG/ML
4 INJECTION INTRAMUSCULAR; INTRAVENOUS
Status: DISCONTINUED | OUTPATIENT
Start: 2022-06-29 | End: 2022-06-29 | Stop reason: HOSPADM

## 2022-06-29 RX ORDER — LIDOCAINE HYDROCHLORIDE 20 MG/ML
INJECTION, SOLUTION INFILTRATION; PERINEURAL PRN
Status: DISCONTINUED | OUTPATIENT
Start: 2022-06-29 | End: 2022-06-29

## 2022-06-29 RX ORDER — KETAMINE HYDROCHLORIDE 10 MG/ML
INJECTION INTRAMUSCULAR; INTRAVENOUS PRN
Status: DISCONTINUED | OUTPATIENT
Start: 2022-06-29 | End: 2022-06-29

## 2022-06-29 RX ORDER — SODIUM CHLORIDE, SODIUM LACTATE, POTASSIUM CHLORIDE, CALCIUM CHLORIDE 600; 310; 30; 20 MG/100ML; MG/100ML; MG/100ML; MG/100ML
INJECTION, SOLUTION INTRAVENOUS CONTINUOUS PRN
Status: DISCONTINUED | OUTPATIENT
Start: 2022-06-29 | End: 2022-06-29

## 2022-06-29 RX ORDER — LIDOCAINE 40 MG/G
CREAM TOPICAL
Status: DISCONTINUED | OUTPATIENT
Start: 2022-06-29 | End: 2022-06-29 | Stop reason: HOSPADM

## 2022-06-29 RX ORDER — ONDANSETRON 4 MG/1
4 TABLET, ORALLY DISINTEGRATING ORAL EVERY 6 HOURS PRN
Status: DISCONTINUED | OUTPATIENT
Start: 2022-06-29 | End: 2022-06-30 | Stop reason: HOSPADM

## 2022-06-29 RX ADMIN — PROPOFOL 20 MG: 10 INJECTION, EMULSION INTRAVENOUS at 11:29

## 2022-06-29 RX ADMIN — PROPOFOL 20 MG: 10 INJECTION, EMULSION INTRAVENOUS at 11:27

## 2022-06-29 RX ADMIN — PROPOFOL 20 MG: 10 INJECTION, EMULSION INTRAVENOUS at 11:16

## 2022-06-29 RX ADMIN — PROPOFOL 60 MG: 10 INJECTION, EMULSION INTRAVENOUS at 10:52

## 2022-06-29 RX ADMIN — PROPOFOL 20 MG: 10 INJECTION, EMULSION INTRAVENOUS at 11:20

## 2022-06-29 RX ADMIN — PROPOFOL 150 MCG/KG/MIN: 10 INJECTION, EMULSION INTRAVENOUS at 10:52

## 2022-06-29 RX ADMIN — SODIUM CHLORIDE, SODIUM LACTATE, POTASSIUM CHLORIDE, CALCIUM CHLORIDE: 600; 310; 30; 20 INJECTION, SOLUTION INTRAVENOUS at 10:48

## 2022-06-29 RX ADMIN — PROPOFOL 40 MG: 10 INJECTION, EMULSION INTRAVENOUS at 11:11

## 2022-06-29 RX ADMIN — KETAMINE HYDROCHLORIDE 20 MG: 10 INJECTION INTRAMUSCULAR; INTRAVENOUS at 11:00

## 2022-06-29 RX ADMIN — LIDOCAINE HYDROCHLORIDE 40 MG: 20 INJECTION, SOLUTION INFILTRATION; PERINEURAL at 10:52

## 2022-06-29 RX ADMIN — PROPOFOL 20 MG: 10 INJECTION, EMULSION INTRAVENOUS at 11:31

## 2022-06-29 RX ADMIN — PROPOFOL 40 MG: 10 INJECTION, EMULSION INTRAVENOUS at 11:24

## 2022-06-29 NOTE — ANESTHESIA POSTPROCEDURE EVALUATION
Patient: Zayra Altamirano    Procedure: Procedure(s):  COLONOSCOPY, WITH POLYPECTOMY        Anesthesia Type:  MAC    Note:  Disposition: Outpatient   Postop Pain Control: Uneventful            Sign Out: Well controlled pain   PONV: No   Neuro/Psych: Uneventful            Sign Out: Acceptable/Baseline neuro status   Airway/Respiratory: Uneventful            Sign Out: Acceptable/Baseline resp. status   CV/Hemodynamics: Uneventful            Sign Out: Acceptable CV status; No obvious hypovolemia; No obvious fluid overload   Other NRE: NONE   DID A NON-ROUTINE EVENT OCCUR? No           Last vitals:  Vitals Value Taken Time   /80 06/29/22 1210   Temp 36.6  C (97.8  F) 06/29/22 1210   Pulse 72 06/29/22 1210   Resp 16 06/29/22 1210   SpO2 100 % 06/29/22 1210       Electronically Signed By: Tunde Jimenez MD, MD  June 29, 2022  1:22 PM

## 2022-06-29 NOTE — ANESTHESIA CARE TRANSFER NOTE
Patient: Zayra Altamirano    Procedure: Procedure(s):  COLONOSCOPY, WITH POLYPECTOMY        Diagnosis: Positive colorectal cancer screening using Cologuard test [R19.5]  Diagnosis Additional Information: No value filed.    Anesthesia Type:   MAC     Note:    Oropharynx: oropharynx clear of all foreign objects and spontaneously breathing  Level of Consciousness: awake  Oxygen Supplementation: room air    Independent Airway: airway patency satisfactory and stable  Dentition: dentition unchanged  Vital Signs Stable: post-procedure vital signs reviewed and stable  Report to RN Given: handoff report given  Patient transferred to: Phase II    Handoff Report: Identifed the Patient, Identified the Reponsible Provider, Reviewed the pertinent medical history, Discussed the surgical course, Reviewed Intra-OP anesthesia mangement and issues during anesthesia, Set expectations for post-procedure period and Allowed opportunity for questions and acknowledgement of understanding      Vitals:  Vitals Value Taken Time   /66 06/29/22 1139   Temp 36.7  C (98.1  F) 06/29/22 1139   Pulse 86 06/29/22 1139   Resp 14 06/29/22 1139   SpO2 98 % 06/29/22 1139       Electronically Signed By: SWAPNA Wakefield CRNA  June 29, 2022  11:47 AM

## 2022-06-29 NOTE — ANESTHESIA PREPROCEDURE EVALUATION
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  change goal INR 1.3-2.0     Rosacea       Past Surgical History:   Procedure Laterality Date     C/SECTION, LOW TRANSVERSE  2008    , Low Transverse      SECTION  2012    Procedure: SECTION; Surgeon:ELY KRAMER; Location:[facility] L+D     ZZC EVENT MONITOR (CARDIAC - FL)  10/08    Normal/had palpatiations.      ZZHC COLPOSCOPY VULVA W BIOPSY        Allergies   Allergen Reactions     Benadryl Allergy Other (See Comments)     Had paradoxical reaction - made her very hyper      Social History     Tobacco Use     Smoking status: Never Smoker     Smokeless tobacco: Never Used   Substance Use Topics     Alcohol use: Yes     Alcohol/week: 1.0 standard drink     Comment: very occasional. 1 to 2x a month I may have a glass of wine      Wt Readings from Last 1 Encounters:   22 89.4 kg (197 lb)        Anesthesia Evaluation            ROS/MED HX  ENT/Pulmonary:  - neg pulmonary ROS     Neurologic:  - neg neurologic ROS     Cardiovascular:       METS/Exercise Tolerance:     Hematologic:     (+) History of blood clots, pt is anticoagulated,      Musculoskeletal:       GI/Hepatic:  - neg GI/hepatic ROS     Renal/Genitourinary:       Endo:     (+) type II DM, Not using insulin,     Psychiatric/Substance Use:  - neg psychiatric ROS     Infectious Disease:       Malignancy:       Other:               OUTSIDE LABS:  CBC:   Lab Results   Component Value Date    WBC 6.8 03/25/2022    WBC 8.1 10/28/2021    HGB 15.3 03/25/2022    HGB 14.2 10/28/2021    HCT 44.8 03/25/2022    HCT 41.1 10/28/2021     03/25/2022     10/28/2021     BMP:   Lab Results   Component Value Date     03/25/2022     10/28/2021    POTASSIUM 4.9 03/25/2022    POTASSIUM 4.6 10/28/2021    CHLORIDE 105 03/25/2022    CHLORIDE 102 10/28/2021    CO2 26 03/25/2022    CO2 30 10/28/2021    BUN 18 03/25/2022    BUN 14 10/28/2021    CR 0.92 03/25/2022    CR 0.73 10/28/2021     (H) 03/25/2022    GLC 95 12/07/2021     COAGS:   Lab Results   Component Value Date    PTT 30 07/26/2007    INR 2.4 (A) 01/22/2019     POC:   Lab Results   Component Value Date    HCG Positive (A) 09/20/2007     HEPATIC:   Lab Results   Component Value Date    ALBUMIN 3.5 03/25/2022    PROTTOTAL 7.5 03/25/2022    ALT 25 03/25/2022    AST 14 03/25/2022    ALKPHOS 54 03/25/2022    BILITOTAL 0.3 03/25/2022     OTHER:   Lab Results   Component Value Date    PH 7.45 12/13/2005    A1C 5.4 10/10/2016    BAUTISTA 9.4 03/25/2022    TSH 3.19 03/25/2022    T4 0.75 (L) 03/25/2022       Anesthesia Plan    ASA Status:  2      Anesthesia Type: MAC.     - Reason for MAC: immobility needed   Induction: Intravenous.           Consents    Anesthesia Plan(s) and associated risks, benefits, and realistic alternatives discussed. Questions answered and patient/representative(s) expressed understanding.     - Discussed: Risks, Benefits and Alternatives for BOTH SEDATION and the PROCEDURE were discussed     - Discussed with:  Patient      - Extended Intubation/Ventilatory Support Discussed: No.      - Patient is DNR/DNI Status:  No    Use of blood products discussed: No .     Postoperative Care            Comments:           H&P reviewed: Unable to attach H&P to encounter due to EHR limitations. H&P Update: appropriate H&P reviewed, patient examined. No interval changes since H&P (within 30 days).         Tunde Jimenez MD, MD

## 2022-06-29 NOTE — H&P
Zayra E Adarsh  [id number]  female  47 year old      Reason for procedure/surgery: colonoscopy after + Cologuard    Patient Active Problem List   Diagnosis     Embolism and thrombosis (H)     Pulmonary embolism and infarction (H)     MARNI DEF CLOT FACTOR NEC, Factor V Leiden homozygous- on lovenox 80mg bid     History of gestational diabetes     CARDIOVASCULAR SCREENING; LDL GOAL LESS THAN 160     Long term current use of anticoagulant therapy     Ankle pain, left     Ankle pain, right     Homozygous Factor V Leiden mutation (H)     Other pulmonary embolism without acute cor pulmonale, unspecified chronicity (H)     Anxiety about health       Past Surgical History:    Past Surgical History:   Procedure Laterality Date     C/SECTION, LOW TRANSVERSE  2008    , Low Transverse      SECTION  2012    Procedure: SECTION; Surgeon:ELY KRAMER; Location: L+D     [facility] EVENT MONITOR (CARDIAC - [place])  10/08    Normal/had palpatiations.      ZZ COLPOSCOPY VULVA W BIOPSY         Past Medical History:   Past Medical History:   Diagnosis Date     Congenital deficiency of other clotting factors     Factor 5 Leiden homozygous mutation     DVT of leg (deep venous thrombosis) (H) 2005     Embolism and thrombosis of unspecified site     left leg that traveled to become PE     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  change goal INR 1.3-2.0     Rosacea        Social History:   Social History     Tobacco Use     Smoking status: Never Smoker     Smokeless tobacco: Never Used   Substance Use Topics     Alcohol use: Yes      Alcohol/week: 1.0 standard drink     Comment: very occasional. 1 to 2x a month I may have a glass of wine       Family History:   Family History   Problem Relation Age of Onset     Breast Cancer Mother 63        IDC 2012, recurrent 2018.     Hypertension Mother      Cerebrovascular Disease Mother         Ischemic stroke 3/18 at age 68.     Lipids Father      Hypertension Father      Circulatory Father         veins stripped      Unknown/Adopted Father         hyperlipidemia      Cataracts Father      Breast Cancer Maternal Grandmother      Diabetes Maternal Grandmother         Type 2     Cerebrovascular Disease Maternal Grandmother      Cardiovascular Maternal Grandmother         MI     Hypertension Maternal Grandmother      Cancer Maternal Grandfather         liver cancer      Substance Abuse Paternal Grandfather      Blood Disease Brother         FACTOR 5 LEIDEN HOMOZYGOUS     Blood Disease Sister         FACTOR 5 LEIDEN      Blood Disease Sister         FACTOR 5     Breast Cancer Maternal Aunt      Breast Cancer Maternal Aunt        Allergies:   Allergies   Allergen Reactions     Benadryl Allergy Other (See Comments)     Had paradoxical reaction - made her very hyper       Active Medications:   Current Outpatient Medications   Medication Sig Dispense Refill     rivaroxaban ANTICOAGULANT (XARELTO ANTICOAGULANT) 10 MG TABS tablet Take 1 tablet (10 mg) by mouth daily (with dinner) TAKE 1 TABLET BY MOUTH EVERY DAY WITH DINNER 90 tablet 3     Acetaminophen (TYLENOL PO) Take 500 mg by mouth       VITAMIN D, CHOLECALCIFEROL, PO Take 1,000 Units by mouth daily         Systemic Review:   CONSTITUTIONAL: NEGATIVE for fever, chills, change in weight  ENT/MOUTH: NEGATIVE for ear, mouth and throat problems  RESP: NEGATIVE for significant cough or SOB  CV: NEGATIVE for chest pain, palpitations or peripheral edema    Physical Examination:   Vital Signs: BP (!) 145/121 (BP Location: Right arm)   Pulse 95   Temp 97  F (36.1  " C) (Skin)   Resp 16   Ht 1.778 m (5' 10\")   Wt 88.5 kg (195 lb)   SpO2 96%   BMI 27.98 kg/m    GENERAL: healthy, alert and no distress  RESP: Normal respiratory excursion   CV: regular rates and rhythm and no peripheral edema  ABDOMEN: soft, nontender and bowel sounds normal  MS: no gross musculoskeletal defects noted, no edema    Plan: Appropriate to proceed as scheduled.      Thomas M. Leventhal, MD  6/29/2022    PCP:  Jaquelin Duarte    "

## 2022-06-30 LAB
PATH REPORT.COMMENTS IMP SPEC: NORMAL
PATH REPORT.FINAL DX SPEC: NORMAL
PATH REPORT.GROSS SPEC: NORMAL
PATH REPORT.MICROSCOPIC SPEC OTHER STN: NORMAL
PATH REPORT.RELEVANT HX SPEC: NORMAL
PHOTO IMAGE: NORMAL

## 2022-08-04 ENCOUNTER — OFFICE VISIT (OUTPATIENT)
Dept: FAMILY MEDICINE | Facility: CLINIC | Age: 47
End: 2022-08-04
Payer: COMMERCIAL

## 2022-08-04 ENCOUNTER — PATIENT OUTREACH (OUTPATIENT)
Dept: ONCOLOGY | Facility: CLINIC | Age: 47
End: 2022-08-04

## 2022-08-04 VITALS
BODY MASS INDEX: 26.54 KG/M2 | WEIGHT: 185 LBS | DIASTOLIC BLOOD PRESSURE: 79 MMHG | RESPIRATION RATE: 18 BRPM | SYSTOLIC BLOOD PRESSURE: 128 MMHG | TEMPERATURE: 98.2 F | HEART RATE: 73 BPM | OXYGEN SATURATION: 97 %

## 2022-08-04 DIAGNOSIS — Z13.1 SCREENING FOR DIABETES MELLITUS: Primary | ICD-10-CM

## 2022-08-04 DIAGNOSIS — Z80.7 FAMILY HISTORY OF LYMPHOMA: ICD-10-CM

## 2022-08-04 DIAGNOSIS — Z80.3 FAMILY HISTORY OF MALIGNANT NEOPLASM OF BREAST: ICD-10-CM

## 2022-08-04 LAB — HBA1C MFR BLD: 5.1 % (ref 0–5.6)

## 2022-08-04 PROCEDURE — 99214 OFFICE O/P EST MOD 30 MIN: CPT | Performed by: NURSE PRACTITIONER

## 2022-08-04 PROCEDURE — 83036 HEMOGLOBIN GLYCOSYLATED A1C: CPT | Performed by: NURSE PRACTITIONER

## 2022-08-04 PROCEDURE — 36415 COLL VENOUS BLD VENIPUNCTURE: CPT | Performed by: NURSE PRACTITIONER

## 2022-08-04 NOTE — PROGRESS NOTES
"  Assessment & Plan     Screening for diabetes mellitus  A1C 5.1 today.  I suspect there was an error in her previous test and I am not concerned she is a diabetic at this time.  We can continue to screen A1C at future preventative visits  - Hemoglobin A1c; Future  - Hemoglobin A1c    Family history of malignant neoplasm of breast  Maternal grandmother, mother, and 2 maternal aunts.    - *MA Screening Digital Bilateral; Future  - Cancer Risk Mgmt/Cancer Genetic Counseling Referral; Future    Family history of lymphoma  Mother and a maternal aunt with follicular lymphoma.  - Cancer Risk Mgmt/Cancer Genetic Counseling Referral; Future             BMI:   Estimated body mass index is 26.54 kg/m  as calculated from the following:    Height as of 6/29/22: 1.778 m (5' 10\").    Weight as of this encounter: 83.9 kg (185 lb).   Weight management plan: Discussed healthy diet and exercise guidelines        No follow-ups on file.    SWAPNA Merida CNP  [facility]    Coral Iverson is a 47 year old, presenting for the following health issues:  LAB REQUEST (Recheck A1C.)    Question about how often to get Mammogram     How to get scheduled with a Genetic counselor for breast cancer family history   Patient possibly interested in preventive mastectomy or prophylactic mastectomy    History of Present Illness       Reason for visit:  Got a H1B of 7% from a study I took part in. Following up.  Symptom onset:  More than a month  Symptom intensity:  Mild  Symptom progression:  Improving  Had these symptoms before:  Yes  Has tried/received treatment for these symptoms:  No  What makes it worse:  Eating sugar  What makes it better:  Eating carefully    She eats 4 or more servings of fruits and vegetables daily.She consumes 0 sweetened beverage(s) daily.She exercises with enough effort to increase her heart rate 30 to 60 minutes per day.  She exercises with enough effort to increase her heart rate 7 " days per week.   She is taking medications regularly.       Was part of a study, told A1C was in the 7%.  Had also notice more urination at night.    Breast cancer history: grandmother, and mother have breast cancer history.  Mom passed away from Follicular Lymphoma.  2 maternal aunts also have diagosis of breast cancer.  Other maternal aunt has follicular lymphoma also.     Mom had genetic testing- was told they didn't find anything significant.              Review of Systems   Constitutional, HEENT, cardiovascular, pulmonary, gi and gu systems are negative, except as otherwise noted.      Objective    /79   Pulse 73   Temp 98.2  F (36.8  C) (Temporal)   Resp 18   Wt 83.9 kg (185 lb)   LMP 07/19/2022 (Exact Date)   SpO2 97%   BMI 26.54 kg/m    Body mass index is 26.54 kg/m .  Physical Exam   GENERAL: healthy, alert and no distress  EYES: Eyes grossly normal to inspection, PERRL and conjunctivae and sclerae normal  RESP: no wheezes  MS: no gross musculoskeletal defects noted, no edema  PSYCH: mentation appears normal, affect normal/bright                    .  ..

## 2022-09-14 NOTE — PATIENT INSTRUCTIONS
Preventive Health Recommendations  Female Ages 40 to 49    Yearly exam:     See your health care provider every year in order to  1. Review health changes.   2. Discuss preventive care.    3. Review your medicines if your doctor prescribed any.      Get a Pap test every three years (unless you have an abnormal result and your provider advises testing more often).      If you get Pap tests with HPV test, you only need to test every 5 years, unless you have an abnormal result. You do not need a Pap test if your uterus was removed (hysterectomy) and you have not had cancer.      You should be tested each year for STDs (sexually transmitted diseases), if you're at risk.     Ask your doctor if you should have a mammogram.      Have a colonoscopy (test for colon cancer) if someone in your family has had colon cancer or polyps before age 50.       Have a cholesterol test every 5 years.       Have a diabetes test (fasting glucose) after age 45. If you are at risk for diabetes, you should have this test every 3 years.    Shots: Get a flu shot each year. Get a tetanus shot every 10 years.     Nutrition:     Eat at least 5 servings of fruits and vegetables each day.    Eat whole-grain bread, whole-wheat pasta and brown rice instead of white grains and rice.    Get adequate Calcium and Vitamin D.      Lifestyle    Exercise at least 150 minutes a week (an average of 30 minutes a day, 5 days a week). This will help you control your weight and prevent disease.    Limit alcohol to one drink per day.    No smoking.     Wear sunscreen to prevent skin cancer.    See your dentist every six months for an exam and cleaning.   Patient with left ureteral calculus. Patient wishes to proceed with left ureteroscopy, laser lithotripsy, stone extraction, stent placement. Informed consent was obtained. Alternatives were given. Risks including but not limited to bleeding, infection, risk of anesthesia, pain, failure to cure, negative ureteroscopy, stone migration, need for future procedure, and injury to surrounding structures were discussed. Patient wishes to proceed with procedure.

## 2022-09-19 ENCOUNTER — ANCILLARY PROCEDURE (OUTPATIENT)
Dept: MAMMOGRAPHY | Facility: CLINIC | Age: 47
End: 2022-09-19
Attending: NURSE PRACTITIONER
Payer: COMMERCIAL

## 2022-09-19 DIAGNOSIS — Z12.31 VISIT FOR SCREENING MAMMOGRAM: ICD-10-CM

## 2022-09-19 DIAGNOSIS — Z80.3 FAMILY HISTORY OF MALIGNANT NEOPLASM OF BREAST: ICD-10-CM

## 2022-09-19 PROCEDURE — 77063 BREAST TOMOSYNTHESIS BI: CPT | Mod: TC | Performed by: RADIOLOGY

## 2022-09-19 PROCEDURE — 77067 SCR MAMMO BI INCL CAD: CPT | Mod: TC | Performed by: RADIOLOGY

## 2022-10-19 NOTE — PROGRESS NOTES
Zayra is a 47 year old who is being evaluated via a billable video visit.      How would you like to obtain your AVS? MyChart  If the video visit is dropped, the invitation should be resent by: Send to e-mail at: [email or web address]  Will anyone else be joining your video visit? No    Video-Visit Details  Video Start Time: 9:00am  Type of service:  Video Visit  Video End Time:10:27am  Originating Location (pt. Location): Home  Distant Location (provider location):  Off-site  Platform used for Video Visit: [facility]     10/20/2022    Referring Provider: Allie Cotter    Present for Today's Visit: Zayra, an [employer] genetic counseling student, Dinah Hubbard    Presenting Information:   I met with Zayra Altamirano today for genetic counseling (video visit due to covid19) to discuss her family history of cancer.  She is here today to review this history, cancer screening recommendations, and available genetic testing options.    Personal History:  Zayra is a 47 year old female. She does not have any personal history of cancer. 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       She had her first menstrual period at age 11, her first child at age 32, and is premenopausal. Zayra has her ovaries, fallopian tubes and uterus in place.  She reports past history of oral contraceptive use and that she has never been on hormone replacement therapy.      Her most recent OB-GYN exam and Pap smear in 2020 were normal. Her most recent mammogram in Sept 2022 was normal and her breast density was reported as scattered fibroglandular densities. Most recent colonoscopy in June 2022 (done due to positive cologuard) resulted in the removal of two 3-6mm sessile serrated adenomas from the descending colon and follow-up was recommended in 5-10 years. She does not regularly do any other cancer screening at this time.  Zayra reported no tobacco use, and occasional  (2x/month) alcohol use.    Family History: Cancer family history and pertinent information reviewed below (Please see scanned pedigree for detailed family history information)    Mother passed at age 71 with a history of an estrogen positive left breast cancer diagnosed at age 63 (lumpectomy, chemo/rad) with a recurrence with metastatic disease at age 68. She was diagnosed with a follicular lymphoma at age 70. Zayra shared that her mother had undergone genetic testing in 2019 or 2020 that was normal.    Maternal aunt, age 71, has a history of breast cancer diagnosed at age 71. She has refused genetic testing.     Maternal aunt, age 69, has a history of breast cancer diagnosed at age 68 (lumpectomy, hormone blocker).    Maternal aunt, age 74, with a history of follicular lymphoma diagnosed at age 70 and a vulvar cancer diagnosed at age 71.     Maternal grandmother passed at age 81 with a history of a right breast cancer diagnosed at age 58 and a left breast cancer diagnosed at age 74.     Maternal grandfather passed at age 69 from esophageal or stomach cancer diagnosed at age 68.    Maternal grandfather's sister with a history of breast cancer.    Maternal grandfather's brother with a history of an unspecified type of cancer (possibly prostate?).    Father, age 72, has a history of a possible skin cancer and benign bladder lesions per Zayra.     Paternal aunt passed at age 58 with a history of an unknown cancer.    Paternal uncle passed at age 55 from a throat cancer.     Paternal uncle passed at age 64 from a brain cancer diagnosed at age 61. He also had a history of lupus.     Zayra did share that she has limited information regarding the health histories for many of her paternal relatives.     There is no known [employer]regation ancestry on either side of her family. There is no reported consanguinity.    Discussion:    Zayra's family history of cancer is suggestive of a hereditary cancer syndrome. There is a  cluster of breast cancer in her mother, two maternal aunts, and maternal grandmother. Her mother had genetic testing before she passed and this genetic testing was reportedly normal. Zayra did not have a copy of her mother's genetic testing results for our review today, so I could not confirm the testing completed or the results. She was encouraged to obtain a copy of her mother's genetic testing results and send them along to us for review.    We reviewed the features of sporadic, familial, and hereditary cancers. We discussed the natural history and genetics of hereditary cancers. A detailed handout regarding the information we discussed will be sent to Zayra via [employer] Topics included: inheritance pattern, cancer risks, cancer screening recommendations, and also risks, benefits and limitations of testing.    We discussed the autosomal dominant inheritance of mutations in many of the genes associated with hereditary cancer syndromes. If her mother was truly negative for mutations in a comprehensive panel of genes, Zayra would not be at risk for inheriting a mutation from her maternal side. We discussed that there is always the caveat that we likely have not discovered all of the genes that exist associated with breast cancer risk. Zayra expressed understanding and also concern about the unknown pieces of her paternal side. She shared that she is motivated to complete genetic testing herself due to this and the fact that she would like to come up with a breast cancer screening/mangement plan given her family history. I shared that insurance may not cover genetic testing unless we find out that her mother's testing was limited/incomplete or identified a mutation. We discussed that there are patient pay options available.   We reviewed that the most common cause of hereditary breast cancer is Hereditary Breast and Ovarian Cancer (HBOC) syndrome, which is caused by mutations in the genes BRCA1 and BRCA2. Women with  a mutation in either of these genes are at increased risk for breast and ovarian cancer. There is also an increased risk for a second primary breast cancer for women. Men with a mutation in either BRCA1 or BRCA2 are at increased risk for male breast and prostate cancer. Both women and men may also be at increased risk for pancreatic cancer and melanoma.     We discussed that there are additional genes that could cause increased risk for breast and related cancers. As many of these genes present with overlapping features in a family and accurate cancer risk cannot always be established based upon the pedigree analysis alone, it would be reasonable for Zayra to consider panel genetic testing to analyze multiple genes at once.    We reviewed genetic testing options given Zayra's family history including targeted and expanded options. After our discussion, Zayra opted to proceed with genetic testing via BRCA1/2 analysis with automatic reflex to the Multi-Cancer panel through [facility].   The [facility] Multi-Cancer Panel analyzes 84 genes associated with an increased risk for cancer: AIP, ALK, APC, DAVID, AXIN2, BAP1, BARD1, BLM, BMPR1A, BRCA1, BRCA2, BRIP1, CASR,CDC73, CDH1, CDK4, CDKN1B, CDKN1C, CDKN2A, CEBPA, CHEK2, CTNNA1, DICER1,DIS3L2, EGFR, EPCAM, FH, FLCN, GATA2, GPC3, GREM1, HOXB13, HRAS, KIT, MAX,MEN1, MET, MITF, MLH1, MSH2, MSH3, MSH6, MUTYH, NBN, NF1, NF2, NTHL1,PALB2, PDGFRA, PHOX2B, PMS2, POLD1, POLE, POT1, CHTGR3L, PTCH1, PTEN, RAD50,RAD51C, RAD51D, RB1, RECQL4, RET, RUNX1, SDHA, SDHAF2, SDHB, SDHC, SDHD,SMAD4, SMARCA4, SMARCB1, SMARCE1, STK11, SUFU, TERC, TERT, OJGD001, TP53,TSC1, TSC2, VHL, WRN, WT1.  This panel includes genes associated with hereditary cancers across multiple major organ systems, including:  breast and gynecologic (breast, ovarian, uterine)  gastrointestinal (colorectal, gastric, pancreatic)  endocrine (thyroid, paraganglioma/pheochromocytoma, parathyroid, pituitary)  genitourinary  (renal/urinary tract, prostate)  skin (melanoma, basal cell carcinoma)  brain/nervous system   sarcoma  hematologic (myelodysplastic syndrome/leukemia)  Individuals who are found to carry a pathogenic variant in one of these genes have an increased risk of developing certain cancers/tumors.  Identifying those at elevated risk may guide implementation of additional screening, surveillance and interventions.      Consent was obtained over the phone/video with no witness required due to the current covid19 global pandemic.    Medical Management: For Zayra, we reviewed that the information from genetic testing may determine:    additional cancer screening for which Zayra may qualify (i.e. mammogram and breast MRI, more frequent colonoscopies, more frequent dermatologic exams, etc.),    options for risk reducing surgeries Zayra could consider (i.e. bilateral mastectomy, surgery to remove her ovaries and/or uterus, etc.),      and targeted chemotherapies for Zayra if she were to develop certain cancers in the future (i.e. immunotherapy for individuals with Chávez syndrome, PARP inhibitors, etc.).     These recommendations will be discussed in detail once genetic testing is completed.     Zayra will schedule a lab only appointment at any [facility] clinic at her earliest convenience.     Plan:  1) Today Zayra elected to proceed with BRCA1/2 analysis with automatic reflex to Multi-Cancer panel genetic testing (84 genes) through [facility].  2) This information should be available in approximately 3 weeks.  3) Zayra will be contacted by our scheduling department to set up a result disclosure appointment.     Rosa Maria Ricketts MS, CGC  Licensed, Certified Genetic Counselor  [facility]  [email or web address]

## 2022-10-20 ENCOUNTER — VIRTUAL VISIT (OUTPATIENT)
Dept: ONCOLOGY | Facility: CLINIC | Age: 47
End: 2022-10-20
Attending: NURSE PRACTITIONER
Payer: COMMERCIAL

## 2022-10-20 DIAGNOSIS — Z80.8 FAMILY HISTORY OF BRAIN CANCER: ICD-10-CM

## 2022-10-20 DIAGNOSIS — Z80.7 FAMILY HISTORY OF LYMPHOMA: ICD-10-CM

## 2022-10-20 DIAGNOSIS — Z80.3 FAMILY HISTORY OF MALIGNANT NEOPLASM OF BREAST: Primary | ICD-10-CM

## 2022-10-20 PROCEDURE — 96040 HC GENETIC COUNSELING, EACH 30 MINUTES: CPT | Mod: GT,95 | Performed by: GENETIC COUNSELOR, MS

## 2022-10-20 NOTE — LETTER
10/20/2022         RE: Zayra Altamirano  [address] [facility] [address]        Dear Colleague,    Thank you for referring your patient, Zayra Altamirano, to the [facility]. Please see a copy of my visit note below.    10/20/2022    Referring Provider: Allie Cotter    Present for Today's Visit: Zayra, an [employer] genetic counseling student, Dinah Hubbard    Presenting Information:   I met with Zayra Altamirano today for genetic counseling (video visit due to covid19) to discuss her family history of cancer.  She is here today to review this history, cancer screening recommendations, and available genetic testing options.    Personal History:  Zayra is a 47 year old female. She does not have any personal history of cancer. 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       She had her first menstrual period at age 11, her first child at age 32, and is premenopausal. Zayra has her ovaries, fallopian tubes and uterus in place.  She reports past history of oral contraceptive use and that she has never been on hormone replacement therapy.      Her most recent OB-GYN exam and Pap smear in 2020 were normal. Her most recent mammogram in Sept 2022 was normal and her breast density was reported as scattered fibroglandular densities. Most recent colonoscopy in June 2022 (done due to positive cologuard) resulted in the removal of two 3-6mm sessile serrated adenomas from the descending colon and follow-up was recommended in 5-10 years. She does not regularly do any other cancer screening at this time.  Zayra reported no tobacco use, and occasional (2x/month) alcohol use.    Family History: Cancer family history and pertinent information reviewed below (Please see scanned pedigree for detailed family history information)    Mother passed at age 71 with a history of an estrogen positive left breast cancer diagnosed at age 63  (lumpectomy, chemo/rad) with a recurrence with metastatic disease at age 68. She was diagnosed with a follicular lymphoma at age 70. Zayra shared that her mother had undergone genetic testing in 2019 or 2020 that was normal.    Maternal aunt, age 71, has a history of breast cancer diagnosed at age 71. She has refused genetic testing.     Maternal aunt, age 69, has a history of breast cancer diagnosed at age 68 (lumpectomy, hormone blocker).    Maternal aunt, age 74, with a history of follicular lymphoma diagnosed at age 70 and a vulvar cancer diagnosed at age 71.     Maternal grandmother passed at age 81 with a history of a right breast cancer diagnosed at age 58 and a left breast cancer diagnosed at age 74.     Maternal grandfather passed at age 69 from esophageal or stomach cancer diagnosed at age 68.    Maternal grandfather's sister with a history of breast cancer.    Maternal grandfather's brother with a history of an unspecified type of cancer (possibly prostate?).    Father, age 72, has a history of a possible skin cancer and benign bladder lesions per Zayra.     Paternal aunt passed at age 58 with a history of an unknown cancer.    Paternal uncle passed at age 55 from a throat cancer.     Paternal uncle passed at age 64 from a brain cancer diagnosed at age 61. He also had a history of lupus.     Zayra did share that she has limited information regarding the health histories for many of her paternal relatives.     There is no known Ashkenazi Buddhism ancestry on either side of her family. There is no reported consanguinity.    Discussion:    Zayra's family history of cancer is suggestive of a hereditary cancer syndrome. There is a cluster of breast cancer in her mother, two maternal aunts, and maternal grandmother. Her mother had genetic testing before she passed and this genetic testing was reportedly normal. Zayra did not have a copy of her mother's genetic testing results for our review today, so I could not  confirm the testing completed or the results. She was encouraged to obtain a copy of her mother's genetic testing results and send them along to us for review.    We reviewed the features of sporadic, familial, and hereditary cancers. We discussed the natural history and genetics of hereditary cancers. A detailed handout regarding the information we discussed will be sent to Zayra via [employer]. Topics included: inheritance pattern, cancer risks, cancer screening recommendations, and also risks, benefits and limitations of testing.    We discussed the autosomal dominant inheritance of mutations in many of the genes associated with hereditary cancer syndromes. If her mother was truly negative for mutations in a comprehensive panel of genes, Zayra would not be at risk for inheriting a mutation from her maternal side. We discussed that there is always the caveat that we likely have not discovered all of the genes that exist associated with breast cancer risk. Zayra expressed understanding and also concern about the unknown pieces of her paternal side. She shared that she is motivated to complete genetic testing herself due to this and the fact that she would like to come up with a breast cancer screening/mangement plan given her family history. I shared that insurance may not cover genetic testing unless we find out that her mother's testing was limited/incomplete or identified a mutation. We discussed that there are patient pay options available.   We reviewed that the most common cause of hereditary breast cancer is Hereditary Breast and Ovarian Cancer (HBOC) syndrome, which is caused by mutations in the genes BRCA1 and BRCA2. Women with a mutation in either of these genes are at increased risk for breast and ovarian cancer. There is also an increased risk for a second primary breast cancer for women. Men with a mutation in either BRCA1 or BRCA2 are at increased risk for male breast and prostate cancer. Both women and  men may also be at increased risk for pancreatic cancer and melanoma.     We discussed that there are additional genes that could cause increased risk for breast and related cancers. As many of these genes present with overlapping features in a family and accurate cancer risk cannot always be established based upon the pedigree analysis alone, it would be reasonable for Zayra to consider panel genetic testing to analyze multiple genes at once.    We reviewed genetic testing options given Zayra's family history including targeted and expanded options. After our discussion, Zayra opted to proceed with genetic testing via BRCA1/2 analysis with automatic reflex to the Multi-Cancer panel through [facility].   The [facility] Multi-Cancer Panel analyzes 84 genes associated with an increased risk for cancer: AIP, ALK, APC, DAVID, AXIN2, BAP1, BARD1, BLM, BMPR1A, BRCA1, BRCA2, BRIP1, CASR,CDC73, CDH1, CDK4, CDKN1B, CDKN1C, CDKN2A, CEBPA, CHEK2, CTNNA1, DICER1,DIS3L2, EGFR, EPCAM, FH, FLCN, GATA2, GPC3, GREM1, HOXB13, HRAS, KIT, MAX,MEN1, MET, MITF, MLH1, MSH2, MSH3, MSH6, MUTYH, NBN, NF1, NF2, NTHL1,PALB2, PDGFRA, PHOX2B, PMS2, POLD1, POLE, POT1, IBOVF3F, PTCH1, PTEN, RAD50,RAD51C, RAD51D, RB1, RECQL4, RET, RUNX1, SDHA, SDHAF2, SDHB, SDHC, SDHD,SMAD4, SMARCA4, SMARCB1, SMARCE1, STK11, SUFU, TERC, TERT, [id number], TP53,TSC1, TSC2, VHL, WRN, WT1.  This panel includes genes associated with hereditary cancers across multiple major organ systems, including:  breast and gynecologic (breast, ovarian, uterine)  gastrointestinal (colorectal, gastric, pancreatic)  endocrine (thyroid, paraganglioma/pheochromocytoma, parathyroid, pituitary)  genitourinary (renal/urinary tract, prostate)  skin (melanoma, basal cell carcinoma)  brain/nervous system   sarcoma  hematologic (myelodysplastic syndrome/leukemia)  Individuals who are found to carry a pathogenic variant in one of these genes have an increased risk of developing certain cancers/tumors.   Identifying those at elevated risk may guide implementation of additional screening, surveillance and interventions.      Consent was obtained over the phone/video with no witness required due to the current covid19 global pandemic.    Medical Management: For Zayra, we reviewed that the information from genetic testing may determine:    additional cancer screening for which Zayra may qualify (i.e. mammogram and breast MRI, more frequent colonoscopies, more frequent dermatologic exams, etc.),    options for risk reducing surgeries Zayra could consider (i.e. bilateral mastectomy, surgery to remove her ovaries and/or uterus, etc.),      and targeted chemotherapies for Zayra if she were to develop certain cancers in the future (i.e. immunotherapy for individuals with Chávez syndrome, PARP inhibitors, etc.).     These recommendations will be discussed in detail once genetic testing is completed.     Zayra will schedule a lab only appointment at any [facility] clinic at her earliest convenience.     Plan:  1) Today Zayra elected to proceed with BRCA1/2 analysis with automatic reflex to Multi-Cancer panel genetic testing (84 genes) through [facility].  2) This information should be available in approximately 3 weeks.  3) Zayra will be contacted by our scheduling department to set up a result disclosure appointment.       Rosa Maria Ricketts MS, [facility] [place] [facility]  Licensed, Certified Genetic Counselor  [facility]  [email or web address]

## 2022-10-21 NOTE — PATIENT INSTRUCTIONS
Assessing Cancer Risk  Only about 5-10% of cancers are thought to be due to an inherited cancer susceptibility gene.    These families often have:  Several people with the same or related types of cancer  Cancers diagnosed at a young age (before age 50)  Individuals with more than one primary cancer  Multiple generations of the family affected with cancer    Some people may be candidates for genetic testing of more than one gene.  For these families, genetic testing using a multi-gene cancer panel may be offered.  These panels may test genes known to increase the risk for breast (and other) cancers: DAVID, BRCA1, BRCA2, CDH1, CHEK2, PALB2, PTEN, and TP53.  The purpose of this handout is to serve as a brief summary of the high/moderate-risk breast cancer genes which have published clinical management guidelines for individuals who are found to carry a mutation.    Hereditary Breast and Ovarian Cancer Syndrome (HBOC)  A single mutation in one of the copies of BRCA1 or BRCA2 increases the risk for breast and ovarian cancer, among others.  The risk for pancreatic cancer and melanoma may also be slightly increased in some families.  The tables below list the chance that someone with a BRCA mutation would develop cancer in his or her lifetime1,2,3,4.          Women   Men     General Population BRCA+    General Population BRCA+   Breast  12% 60-90%  Breast <1% ~7%   Ovarian  1-2% Up to 58%  Prostate 16% 20%     A person s ethnic background is also important to consider, as individuals of Ashkenazi Confucianism ancestry have a higher chance of having a BRCA gene mutation.  There are three BRCA mutations that occur more frequently in this population.    Li-Fraumeni Syndrome (LFS)  LFS is a cancer predisposition syndrome. Individuals with LFS are at an increased risk for developing cancer at a young age. The general lifetime risk for development of cancer is 50% by age 30 and 90% by age 60.  LFS is caused by a mutation in the TP53  gene.  A single mutation in one of the copies of TP53 increases the risk for multiple cancers.     Core Cancers: Sarcomas, Breast, Brain, Lung, Leukemias/Lymphomas, Adrenocortical carcinomas  Other Cancers: Gastrointestinal, Thyroid, Skin, Genitourinary    Cowden Syndrome  Cowden syndrome is a hereditary condition that increases the risk for breast, thyroid, endometrial, and kidney cancer.  Cowden syndrome is caused by a mutation in the PTEN gene.  A single mutation in one of the copies of PTEN causes Cowden syndrome and increases cancer risk.  The table below shows the chance that someone with a PTEN mutation would develop cancer in their lifetime5,6.  Other benign features seen in some individuals with Cowden syndrome include benign skin lesions (facial papules, keratoses, lipomas), learning disability, autism, thyroid nodules, colon polyps, and larger head size.      Lifetime Cancer Risk   Cancer Type General Population Cowden Syndrome   Breast  12% 25-50%*   Thyroid  1% 35%   Renal 1-2% 35%   Endometrial  2-3% 28%   Colon 5% 9%   Melanoma 2-3% >5%     *One recent study found breast cancer risk to be increased to 85%    Hereditary Diffuse Gastric Cancer (HDGC)  Currently, one gene is known to cause hereditary diffuse gastric cancer: CDH1.  Individuals with HDGC are at increased risk for diffuse gastric cancer and lobular breast cancer. Of people diagnosed with HDGC, 30-50% have a mutation in the CDH1 gene.  This suggests there are likely other genes that may cause HDGC that have not been identified yet.      Lifetime Cancer Risks    General Pop HDGC    Diffuse Gastric  <1% ~80%   Breast 12% 39-52%     Additional Genes Associated with Increased Breast Cancer Risk  PALB2  Mutations in PALB2 have been shown to increase the risk of breast cancer up to 33-58% in some families; where individuals fall within this risk range is dependent upon family history.9 PALB2 mutations have also been associated with increased risk  for pancreatic cancer, although this risk has not been quantified yet.  Individuals who inherit two PALB2 mutations--one from their mother and one from their father--have a condition called Fanconi Anemia.  This rare autosomal recessive condition is associated with short stature, developmental delay, bone marrow failure, and increased risk for childhood cancers.    DAVID  DAVID is a moderate-risk breast cancer gene. Women who have a mutation in DAVID can have between a 2-4 fold increased risk for breast cancer compared to the general population.10  DAVID mutations have also been associated with increased risk for pancreatic cancer, however an estimate of this cancer risk is not well understood.11  Individuals who inherit two DAVID mutations have a condition called ataxia-telangiectasia (AT).  This rare autosomal recessive condition affects the nervous system and immune system, and is associated with progressive cerebellar ataxia beginning in childhood.  Individuals with ataxia-telangiectasia often have a weakened immune system and have an increased risk for childhood cancers.           CHEK2   CHEK2 is a moderate-risk breast cancer gene.  Women who have a mutation in CHEK2 have around a 2-fold increased risk for breast cancer compared to the general population, and this risk may be higher depending upon family history.12,13,14  Mutations in CHEK2 have also been shown to increase the risk of a number of other cancers, including colon and prostate, however these cancer risks are currently not well understood.         Inheritance   All of the genes reviewed above are inherited in an autosomal dominant pattern.  This means that if a parent has a mutation, each of his or her children will have a 50% chance of inheriting that same mutation.  Therefore, each child--male or female--would have a 50% chance of being at increased risk for developing cancer.    Image obtained from [employer] Home Reference, 2013     Mutations in some genes  can occur de balta, which means that a person s mutation occurred for the first time in them and was not inherited from a parent.  Now that they have the mutation, however, it can be passed on to future generations.    Genetic Testing  Genetic testing involves a blood test and will look for any harmful mutations within genes that are associated with increased cancer risk.  If possible, it is recommended that the person(s) who has had cancer be tested before other family members.  That person will give us the most useful information about whether or not a specific gene is associated with the cancer in the family.    Results  There are three possible results of genetic testing:  Positive--a harmful mutation was identified in one or more of the genes  Negative--no mutation was identified in any of the genes on this panel  Variant of unknown significance--a variation in one of the genes was identified, but it is unclear how this impacts cancer risk in the family    Advantages and Disadvantages  There are advantages and disadvantages to genetic testing.  Advantages  May clarify your cancer risk  Can help you make medical decisions  May explain the cancers in your family  May give useful information to your family members (if you share your results)    Disadvantages  Possible negative emotional impact of learning about inherited cancer risk  Uncertainty in interpreting a negative test result in some situations  Possible genetic discrimination concerns (see below)    Genetic Information Nondiscrimination Act (ROMEO)  ROMEO is a federal law that protects individuals from health insurance or employment discrimination based on a genetic test result alone.  Although rare, there are currently no legal discrimination protections in terms of life insurance, long term care, or disability insurances.  Visit the National Human Genome Research [place] genome.gov/57311316 to learn more.    Reducing Cancer Risk  Each of the genes listed  within this handout have nationally recognized cancer screening guidelines that would be recommended for individuals who test positive.  In addition to increased cancer screening, surgeries may be offered or recommended to reduce cancer risk.  Recommendations are based upon an individual s genetic test result as well as their personal and family history of cancer.    Questions to Think About Regarding Genetic Testing  What effect will the test result have on me and my relationship with my family members if I have an inherited gene mutation?  If I don t have a gene mutation?  Should I share my test results, and how will my family react to this news, which may also affect them?  Are my children ready to learn new information that may one day affect their own health?    Resources  FORCE: Facing Our Risk of Cancer Empowered facingourrisk.org   Bright Pink bebrightpink.org   Li-Fraumeni Syndrome Association lfsassociation.org   PTEN World PTENworld.[email or web address]   No stomach for cancer, Inc. nostomachforcancer.org   Stomach cancer relief network scrnet.org   Collaborative Group of the Americas on Inherited Colorectal Cancer (CGA) cgaicc.com    Cancer Care cancercare.org   American Cancer Society (ACS) cancer.org   National Cancer [place] (NCI) cancer.gov     Please call us if you have any questions or concerns.   Cancer Risk Management Program [phone]-CANCER ([phone])    -References  A-wilma CONTE, Farooq PDP, Juve S, Soha ALEXANDER, aTo JE, Robin JL, Santa N, Shira H, Rick O, Sancho A, Trisha B, Anel P, Antonio S, Roc DM, Peewee N, Teresa E, Robert H, Benson E, Mayte J, Iglesia J, Rao B, Lashawn H, Rgeine S, Sachi H, Kristina H, Hnas K, Edgardo OP. Average risks of breast and ovarian cancer associated with BRCA1 or BRCA2 mutations detected in case series unselected for family history: a combined analysis of 222 studies. Am J Hum Marisa. 2003;72:1117-30.  Ervin MCMANUS, Lina HARRY,  Don PÉREZ.  BRCA1 and BRCA2 Hereditary Breast and Ovarian Cancer. Gene Reviews online. 2013.  Te YC, Kelly S, Sheree G, Azevedo S. Breast cancer risk among male BRCA1 and BRCA2 mutation carriers. J Natl Cancer Inst. 2007;99:1811-4.  Ntio MADRIGAL, Cami I, Shane J, Carlos E, Zhou ER, Dedra F. Risk of breast cancer in male BRCA2 carriers. J Med Marisa. 2010;47:710-1.  Bay MH, Thuan J, Chio J, Sahra LA, Xavier MS, Davon C. Lifetime cancer risks in individuals with germline PTEN mutations. Clin Cancer Res. 2012;18:400-7.  Pilcory R. Cowden Syndrome: A Critical Review of the Clinical Literature. J Marisa . 2009:18:13-27.  National Comprehensive Cancer Network. Clinical practice guidelines in oncology, colorectal cancer screening. Available online (registration required). 2013.  National Cancer [place]. SEER Cancer Stat Fact Sheets.  December 2013.  Ye BOND, et al. Breast-Cancer Risk in Families with Mutations in PALB2. NEJM. 2014; 371(6):497-506.  Daisy A, Jerry D, Mora S, Gabby P, Lillie T, Azeb M, José Antonio B, Karsten H, Franck R, Michael K, Doyle L, Nito MADRIGAL, Roc D, Adalid DF, Alec MR, The Breast Cancer Susceptibility Collaboration (UK) & Margarita MCMANUS. DAVID mutations that cause ataxia-telangiectasia are breast cancer susceptibility alleles. Nature Genetics. 2006;38:873-875  Giovanny N , Svetlana Y, Jyoti J, Arturo L, Raj GM , David ML, Yamel S, Carpenter AG, Syngal S, Jelena ML, Leo J , Chyna R, Ligia SZ, Pk JR, Flakito VE, Isaac M, Vogelstein B, Ariane N, Rosanna RH, David KW, and Hamzah AP. DAVID mutations in patients with hereditary pancreatic cancer. Cancer Discover. 2012;2:41-46  CHEK2 Breast Cancer Case-Control Consortium. CHEK2*1100delC and susceptibility to breast cancer: A collaborative analysis involving 10,860 breast cancer cases and 9,065 controls from 10 studies. Am J Hum Marisa, 74 (2004), pp. 6725-9622  Daria T, Cortez S, Flash K, et al.  Spectrum of Mutations in BRCA1, BRCA2, CHEK2, and TP53 in Families at High Risk of Breast Cancer. MEMO. 2006;295(12):1858-8189.   Rebekah BOOTHE, Katherin CORDOBA, Shazia CONTE, et al. Risk of breast cancer in women with a CHEK2 mutation with and without a family history of breast cancer. J Clin Oncol. 2011;29:3706-2696

## 2022-10-25 ENCOUNTER — LAB (OUTPATIENT)
Dept: LAB | Facility: CLINIC | Age: 47
End: 2022-10-25
Payer: COMMERCIAL

## 2022-10-25 DIAGNOSIS — Z80.3 FAMILY HISTORY OF MALIGNANT NEOPLASM OF BREAST: ICD-10-CM

## 2022-10-25 DIAGNOSIS — Z80.7 FAMILY HISTORY OF LYMPHOMA: ICD-10-CM

## 2022-10-25 PROCEDURE — 36415 COLL VENOUS BLD VENIPUNCTURE: CPT

## 2022-10-25 PROCEDURE — 99000 SPECIMEN HANDLING OFFICE-LAB: CPT

## 2022-11-01 LAB — SCANNED LAB RESULT: NORMAL

## 2022-11-09 NOTE — PROGRESS NOTES
"Zayra is a 47 year old who is being evaluated via a billable video visit.      How would you like to obtain your AVS? MyChart  If the video visit is dropped, the invitation should be resent by: Text to cell phone: [phone]  Will anyone else be joining your video visit? No    Video-Visit Details  Video Start Time: 10:06am  Type of service:  Video Visit  Video End Time:10:27am  Originating Location (pt. Location): Home  Distant Location (provider location):  Off-site  Platform used for Video Visit: [id number]/2022    Referring Provider: SWAPNA Merida CNP    Presenting Information:  I spoke to Zayra by phone today to discuss her genetic testing results. Her blood was drawn on 10/25/2022 and Multi-Cancer panel testing was ordered from [facility]. This testing was done because of Zayra's family history of cancer.    Genetic Testing Result: NEGATIVE  Zayra is negative for mutations in the AIP, ALK, APC, DAVID, AXIN2, BAP1, BARD1, BLM, BMPR1A, BRCA1, BRCA2, BRIP1, CASR, CDC73, CDH1, CDK4, CDKN1B, CDKN1C, CDKN2A, CEBPA, CHEK2, CTNNA1, DICER1, DIS3L2, EGFR, EPCAM, FH, FLCN, GATA2, GPC3, GREM1, HOXB13, HRAS, KIT, MAX, MEN1, MET, MITF, MLH1, MSH2, MSH3, MSH6, MUTYH, NBN, NF1, NF2, NTHL1, PALB2, PDGFRA, PHOX2B, PMS2, POLD1, POLE, POT1, KSFZI1A, PTCH1, PTEN, RAD50, RAD51C, RAD51D, RB1, RECQL4, RET, RUNX1, SDHA, SDHAF2, SDHB, SDHC, SDHD, SMAD4, SMARCA4, SMARCB1, SMARCE1, STK11, SUFU, TERC, TERT, [id number], TP53, TSC1, TSC2, VHL, WRN, and WT1 genes.      No mutations were found in any of the 84 genes analyzed. Please see copy of scanned test report for complete details regarding testing methodology/limitations.    A copy of the test report can be found in the Laboratory tab, dated 10/25/2022, and named \"LABORATORY MISCELLANEOUS ORDER\". The report is scanned in as a linked document.    Interpretation:  We discussed several different interpretations of this negative test result.    1. One explanation may be that there is a " different gene or combination of genes and environment that are associated with the cancers in this family.  2. It is possible that her other relatives with cancer history did have a mutation in one of the above genes, and she did not inherit it.  3. There is also a small possibility that there is a mutation in one of these genes, and the testing laboratory could not find it with their current testing methods.       Screening:  Based on this negative test result, it is important for Zayra and her relatives to refer back to the family history for appropriate cancer screening.      Based on her personal and family history, Zayra has a 22.5%% lifetime risk of developing breast cancer based on the GREGORIO (v8) model. As such, Zayra meets current National Comprehensive Cancer Network (NCCN) guidelines for high risk breast screening. This includes annual breast MRI in addition to annual mammograms.  In addition, Zayra should be receiving clinical breast exams by her physician. Zayra also shared that she is interested in potentially pursing prophylactic mastectomies. We discussed that Zayra could participate in our Cancer Risk Management Program in which our nursing specialist provides an individual screening plan and assists with medical management. A referral was made to see KAMALA Thornton for this service.    Other population cancer screening options, such as those recommended by the American Cancer Society and the National Comprehensive Cancer Network (NCCN), are also appropriate for Zayra and her family. These screening recommendations may change if there are changes to Zayra's personal and/or family history. Final screening recommendations should be made by each individual's managing physician.      Inheritance:  We reviewed the autosomal dominant inheritance of mutations in these 84 genes. We discussed that Zayra cannot/did not pass on an identifiable mutation in these genes to her children based on this test  result.  Mutations in these genes do not skip generations.      Additional Testing Considerations:  Although Zayra's genetic testing result was negative, other relatives may still carry a gene mutation associated with their persona/family history of cancer. Genetic counseling is recommended for her maternal relatives to discuss genetic testing options. If any of these relatives do pursue genetic testing, Zayra is encouraged to contact me so that we may review the impact of their test results on her.    Summary:  We do not have an explanation for Zayra's family history of cancer. While no genetic changes were identified, Zayra may still be at risk for certain cancers due to family history, environmental factors, or other genetic causes not identified by this test.  Because of that, it is important that she continue with cancer screening based on her personal and family history as discussed above.    Genetic testing is rapidly advancing, and new cancer susceptibility genes will most likely be identified in the future.  Therefore, I encouraged Zayra to contact me annually or if there are changes in her personal or family history.  This may change how we assess her cancer risk, screening, and the testing we would offer.    Plan:  1. A copy of the test results will be mailed to Zayra.  2. She plans to follow-up with her primary care providers for routine care.  3. Referral placed to KAMALA Thornton to discuss high-risk breast cancer screening.   3. She should contact me regularly, or sooner if her family history changes.    Rosa Maria Ricketts MS, [facility]  Licensed, Certified Genetic Counselor  [facility]  [email or web address]

## 2022-11-10 ENCOUNTER — VIRTUAL VISIT (OUTPATIENT)
Dept: ONCOLOGY | Facility: CLINIC | Age: 47
End: 2022-11-10
Attending: GENETIC COUNSELOR, MS
Payer: COMMERCIAL

## 2022-11-10 DIAGNOSIS — Z80.8 FAMILY HISTORY OF BRAIN CANCER: ICD-10-CM

## 2022-11-10 DIAGNOSIS — Z80.3 FAMILY HISTORY OF MALIGNANT NEOPLASM OF BREAST: Primary | ICD-10-CM

## 2022-11-10 DIAGNOSIS — Z80.7 FAMILY HISTORY OF LYMPHOMA: ICD-10-CM

## 2022-11-10 PROCEDURE — 999N000069 HC STATISTIC GENETIC COUNSELING, < 16 MIN: Mod: GT,95 | Performed by: GENETIC COUNSELOR, MS

## 2022-11-10 NOTE — LETTER
"    11/10/2022         RE: Zayra Altamirano  [address] [place] [facility] [place] [address]        Dear Colleague,    Thank you for referring your patient, Zayra Altamirano, to the [facility]. Please see a copy of my visit note below.    11/9/2022    Referring Provider: SWAPNA Merida CNP    Presenting Information:  I spoke to Zayra by phone today to discuss her genetic testing results. Her blood was drawn on 10/25/2022 and Multi-Cancer panel testing was ordered from [facility]. This testing was done because of Zayra's family history of cancer.    Genetic Testing Result: NEGATIVE  Zayra is negative for mutations in the AIP, ALK, APC, DAVID, AXIN2, BAP1, BARD1, BLM, BMPR1A, BRCA1, BRCA2, BRIP1, CASR, CDC73, CDH1, CDK4, CDKN1B, CDKN1C, CDKN2A, CEBPA, CHEK2, CTNNA1, DICER1, DIS3L2, EGFR, EPCAM, FH, FLCN, GATA2, GPC3, GREM1, HOXB13, HRAS, KIT, MAX, MEN1, MET, MITF, MLH1, MSH2, MSH3, MSH6, MUTYH, NBN, NF1, NF2, NTHL1, PALB2, PDGFRA, PHOX2B, PMS2, POLD1, POLE, POT1, YGECC0M, PTCH1, PTEN, RAD50, RAD51C, RAD51D, RB1, RECQL4, RET, RUNX1, SDHA, SDHAF2, SDHB, SDHC, SDHD, SMAD4, SMARCA4, SMARCB1, SMARCE1, STK11, SUFU, TERC, TERT, [id number], TP53, TSC1, TSC2, VHL, WRN, and WT1 genes.      No mutations were found in any of the 84 genes analyzed. Please see copy of scanned test report for complete details regarding testing methodology/limitations.    A copy of the test report can be found in the Laboratory tab, dated 10/25/2022, and named \"LABORATORY MISCELLANEOUS ORDER\". The report is scanned in as a linked document.    Interpretation:  We discussed several different interpretations of this negative test result.    1. One explanation may be that there is a different gene or combination of genes and environment that are associated with the cancers in this family.  2. It is possible that her other relatives with cancer history did have a mutation in one of the above genes, and she did not inherit it.  3. There is also " a small possibility that there is a mutation in one of these genes, and the testing laboratory could not find it with their current testing methods.       Screening:  Based on this negative test result, it is important for Zayra and her relatives to refer back to the family history for appropriate cancer screening.      Based on her personal and family history, Zayra has a 22.5%% lifetime risk of developing breast cancer based on the GREGORIO (v8) model. As such, Zayra meets current National Comprehensive Cancer Network (NCCN) guidelines for high risk breast screening. This includes annual breast MRI in addition to annual mammograms.  In addition, Zayra should be receiving clinical breast exams by her physician. Zayra also shared that she is interested in potentially pursing prophylactic mastectomies. We discussed that Zayra could participate in our Cancer Risk Management Program in which our nursing specialist provides an individual screening plan and assists with medical management. A referral was made to see KAMALA Thornton for this service.    Other population cancer screening options, such as those recommended by the American Cancer Society and the National Comprehensive Cancer Network (NCCN), are also appropriate for Zayra and her family. These screening recommendations may change if there are changes to Zayra's personal and/or family history. Final screening recommendations should be made by each individual's managing physician.      Inheritance:  We reviewed the autosomal dominant inheritance of mutations in these 84 genes. We discussed that Zayra cannot/did not pass on an identifiable mutation in these genes to her children based on this test result.  Mutations in these genes do not skip generations.      Additional Testing Considerations:  Although Zayra's genetic testing result was negative, other relatives may still carry a gene mutation associated with their persona/family history of cancer. Genetic  counseling is recommended for her maternal relatives to discuss genetic testing options. If any of these relatives do pursue genetic testing, Zayra is encouraged to contact me so that we may review the impact of their test results on her.    Summary:  We do not have an explanation for Zayra's family history of cancer. While no genetic changes were identified, Zayra may still be at risk for certain cancers due to family history, environmental factors, or other genetic causes not identified by this test.  Because of that, it is important that she continue with cancer screening based on her personal and family history as discussed above.    Genetic testing is rapidly advancing, and new cancer susceptibility genes will most likely be identified in the future.  Therefore, I encouraged Zayra to contact me annually or if there are changes in her personal or family history.  This may change how we assess her cancer risk, screening, and the testing we would offer.    Plan:  1. A copy of the test results will be mailed to Zayra.  2. She plans to follow-up with her primary care providers for routine care.  3. Referral placed to KAMALA Thornton to discuss high-risk breast cancer screening.   3. She should contact me regularly, or sooner if her family history changes.    Rosa Maria Ricketts MS, [facility]  Licensed, Certified Genetic Counselor  [facility]  [email or web address]

## 2022-11-10 NOTE — LETTER
"    Cancer Risk Management  Program [facility]  [facility]  [facility]  [facility]  Mailing Address  Cancer Risk Management Program  [facility]  [address]  [place] [address]    New patient appointments  [phone]  November 10, 2022    Zayra Altamirano  [address]  [place] [address]      Dear Zayra,    It was a pleasure speaking with you on 11/10/2022.  Here is a copy of the progress note from our discussion. If you have any additional questions, please feel free to call.    Referring Provider: SWAPNA Merida CNP    Presenting Information:  I spoke to Zayra by phone today to discuss her genetic testing results. Her blood was drawn on 10/25/2022 and Multi-Cancer panel testing was ordered from [facility]. This testing was done because of Zayra's family history of cancer.    Genetic Testing Result: NEGATIVE  Zayra is negative for mutations in the AIP, ALK, APC, DAVID, AXIN2, BAP1, BARD1, BLM, BMPR1A, BRCA1, BRCA2, BRIP1, CASR, CDC73, CDH1, CDK4, CDKN1B, CDKN1C, CDKN2A, CEBPA, CHEK2, CTNNA1, DICER1, DIS3L2, EGFR, EPCAM, FH, FLCN, GATA2, GPC3, GREM1, HOXB13, HRAS, KIT, MAX, MEN1, MET, MITF, MLH1, MSH2, MSH3, MSH6, MUTYH, NBN, NF1, NF2, NTHL1, PALB2, PDGFRA, PHOX2B, PMS2, POLD1, POLE, POT1, TXOHZ5C, PTCH1, PTEN, RAD50, RAD51C, RAD51D, RB1, RECQL4, RET, RUNX1, SDHA, SDHAF2, SDHB, SDHC, SDHD, SMAD4, SMARCA4, SMARCB1, SMARCE1, STK11, SUFU, TERC, TERT, [id number], TP53, TSC1, TSC2, VHL, WRN, and WT1 genes.      No mutations were found in any of the 84 genes analyzed. Please see copy of scanned test report for complete details regarding testing methodology/limitations.    A copy of the test report can be found in the Laboratory tab, dated 10/25/2022, and named \"LABORATORY MISCELLANEOUS ORDER\". The report is scanned in as a linked " document.    Interpretation:  We discussed several different interpretations of this negative test result.    1. One explanation may be that there is a different gene or combination of genes and environment that are associated with the cancers in this family.  2. It is possible that her other relatives with cancer history did have a mutation in one of the above genes, and she did not inherit it.  3. There is also a small possibility that there is a mutation in one of these genes, and the testing laboratory could not find it with their current testing methods.       Screening:  Based on this negative test result, it is important for Zayra and her relatives to refer back to the family history for appropriate cancer screening.      Based on her personal and family history, Zayra has a 22.5%% lifetime risk of developing breast cancer based on the GREGORIO (v8) model. As such, Zayra meets current National Comprehensive Cancer Network (NCCN) guidelines for high risk breast screening. This includes annual breast MRI in addition to annual mammograms.  In addition, Zayra should be receiving clinical breast exams by her physician. Zayra also shared that she is interested in potentially pursing prophylactic mastectomies. We discussed that Zayra could participate in our Cancer Risk Management Program in which our nursing specialist provides an individual screening plan and assists with medical management. A referral was made to see KAMALA Thornton for this service.    Other population cancer screening options, such as those recommended by the American Cancer Society and the National Comprehensive Cancer Network (NCCN), are also appropriate for Zayra and her family. These screening recommendations may change if there are changes to Zayra's personal and/or family history. Final screening recommendations should be made by each individual's managing physician.      Inheritance:  We reviewed the autosomal dominant inheritance of  mutations in these 84 genes. We discussed that Zayra cannot/did not pass on an identifiable mutation in these genes to her children based on this test result.  Mutations in these genes do not skip generations.      Additional Testing Considerations:  Although Zayra's genetic testing result was negative, other relatives may still carry a gene mutation associated with their persona/family history of cancer. Genetic counseling is recommended for her maternal relatives to discuss genetic testing options. If any of these relatives do pursue genetic testing, Zayra is encouraged to contact me so that we may review the impact of their test results on her.    Summary:  We do not have an explanation for Zayra's family history of cancer. While no genetic changes were identified, Zayra may still be at risk for certain cancers due to family history, environmental factors, or other genetic causes not identified by this test.  Because of that, it is important that she continue with cancer screening based on her personal and family history as discussed above.    Genetic testing is rapidly advancing, and new cancer susceptibility genes will most likely be identified in the future.  Therefore, I encouraged Zayra to contact me annually or if there are changes in her personal or family history.  This may change how we assess her cancer risk, screening, and the testing we would offer.    Plan:  1. A copy of the test results will be mailed to Zayra.  2. She plans to follow-up with her primary care providers for routine care.  3. Referral placed to KAMALA Thornton to discuss high-risk breast cancer screening.   3. She should contact me regularly, or sooner if her family history changes.    Rosa Maria Ricketts MS, [facility]  Licensed, Certified Genetic Counselor  [facility]  [email or web address]

## 2022-11-11 NOTE — PATIENT INSTRUCTIONS
Negative Genetic Test Result    Genetic Testing  You had a blood test that looked at the genetic information in one or more genes associated with increased cancer risk.  The testing looked for any harmful changes that would stop this particular gene from working like it should. If an individual does not have any harmful changes or variants of unknown significance found from their blood test, their genetic test result is reported as negative.       Results  The genetic test did not identify any pathogenic (harmful) changes in the genes that were tested. There are several possible explanations for a negative test result. Without knowing the gene mutation in your family, the cause of the cancer in you or your relatives is still unknown. Your genetic counselor can help interpret the result for you and your relatives. In this case, there are several reasons that may explain the negative test result:  There may be a gene mutation in the family that you did not inherit.   You may have a gene mutation in a different gene that was not included in the test, or has not yet been discovered.   The cancers in you or your family may be due to a combination of genetic factors and environment (multifactorial/familial).  The cancers in you or your family may be sporadic/random cancers.  There is very small chance that a mutation was not found by current testing methods.  As testing technology evolves over time, it may still be possible to identify a mutation in a gene that was not found on this test.    It is important to note which genes were included in your test. A list of these genes can be found on your test result.    Screening Recommendations  Due to this negative test result, cancer screening recommendations should be based on your personal and family history. This may include increased cancer screening for you and/or your family members. Your genetic counselor and health care provider can help make appropriate  recommendations.      Please call us if you have any questions or concerns.   Cancer Risk Management Program [phone] [phone])

## 2022-11-17 NOTE — PROGRESS NOTES
Oncology Risk Management Consultation:  Date on this visit: 11/18/2022    Zayra Altamirano  is referred by [facility],  for an oncology risk management consultation. She requires high risk screening and surveillance to reduce her risk of cancer secondary to having a family history of breast cancer in her mother and two maternal aunts. She is considered to be at high risk for breast cancer and has a 22.5% lifetime risk for breast cancer by the GREGORIO  model. Also present at this visit is Mayra Mcfarland, DNP Student at [employer].    Primary Physician:  SWAPNA Chavez-CNP    History Of Present Illness:  Ms. Altamirano is a 47 year old female who presents with a family history of breast cancer.    Pertinent history:  homozygous for the factor 5 Leiden mutation and report that she does have a history of PE/DVT.   Menarche at age 11  First child at age 32  Hx of breastfeeding both children  Premenopausal.   LMP: cycles are regular, monthly; some warmth, but not concerning hot flashes  Ovaries, fallopian tubes and uterus in place.   Hx of oral contraceptive use  No hx of  hormone replacement therapy.    Breast density: scattered fibroglandular densities.   No hx of breast biopsy.  No hx of hyperplasia, atypia or malignancy    Genetic testing:  10/25/2022-   Genetic Testing Result: NEGATIVE  Zayra is negative for mutations in the AIP, ALK, APC, DAVID, AXIN2, BAP1, BARD1, BLM, BMPR1A, BRCA1, BRCA2, BRIP1, CASR, CDC73, CDH1, CDK4, CDKN1B, CDKN1C, CDKN2A, CEBPA, CHEK2, CTNNA1, DICER1, DIS3L2, EGFR, EPCAM, FH, FLCN, GATA2, GPC3, GREM1, HOXB13, HRAS, KIT, MAX, MEN1, MET, MITF, MLH1, MSH2, MSH3, MSH6, MUTYH, NBN, NF1, NF2, NTHL1, PALB2, PDGFRA, PHOX2B, PMS2, POLD1, POLE, POT1, FGXWK7E, PTCH1, PTEN, RAD50, RAD51C, RAD51D, RB1, RECQL4, RET, RUNX1, SDHA, SDHAF2, SDHB, SDHC, SDHD, SMAD4, SMARCA4, SMARCB1, SMARCE1, STK11, SUFU, TERC, TERT, DIXT943, TP53, TSC1, TSC2, VHL, WRN, and WT1 genes identified using a  Multi-Cancer panel testing  from [facility]. This testing was done because of Zayra's family history of cancer.    Pertinent screening history:  20 16: Baseline screening mammogram, BI-RADS 1  2017: Screening tomosynthesis mammogram, BI-RADS 1  2018: Screening tomosynthesis mammogram, BI-RADS 1  2018: Right ultrasound for palpable lumps, BI-RADS 1  3/13/2019: Screening tomosynthesis mammogram, BI-RADS 1  2020: Screening tomosynthesis mammogram, BI-RADS 1  2021: Screening tomosynthesis mammogram, BI-RADS 1  2022: Screening tomosynthesis mammogram, BI-RADS 1  Most recent colonoscopy in 2022 (done due to positive Cologuard test) resulted in the removal of two 3-6 mm sessile serrated adenomas from the descending colon and follow-up was recommended in 5-10 years.  Next due: 2027    At this visit, she denies new fatigue, breast pain, asymmetry, lumps, masses, thickening, nipple discharge and skin changes in her breasts.      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  change goal INR 1.3-2.0     Rosacea      Past Surgical History:   Procedure Laterality Date     C/SECTION, LOW TRANSVERSE  2008    , Low Transverse      SECTION  2012    Procedure: SECTION; Surgeon:ELY KRAMER  ROBERTA; Location:[facility] L+D     COLONOSCOPY N/A 6/29/2022    Procedure: COLONOSCOPY, WITH POLYPECTOMY ;  Surgeon: Leventhal, Thomas Michael, MD;  [place] [facility] [place]     [facility] EVENT MONITOR (CARDIAC - [place])  10/08    Normal/had palpatiations.      [facility] COLPOSCOPY VULVA W BIOPSY  2002       Allergies:  Allergies as of 11/18/2022 - Reviewed 11/18/2022   Allergen Reaction Noted     Benadryl allergy Other (See Comments) 08/30/2011       Current Medications:  Current Outpatient Medications   Medication Sig Dispense Refill     Acetaminophen (TYLENOL PO) Take 500 mg by mouth as needed       BIOTIN PO Take by mouth daily       rivaroxaban ANTICOAGULANT (XARELTO ANTICOAGULANT) 10 MG TABS tablet Take 1 tablet (10 mg) by mouth daily (with dinner) TAKE 1 TABLET BY MOUTH EVERY DAY WITH DINNER 90 tablet 3     VITAMIN D, CHOLECALCIFEROL, PO Take 1,000 Units by mouth daily          Family History:  Family History   Problem Relation Age of Onset     Breast Cancer Mother 63        IDC 2012, recurrent 2018; ER+     Hypertension Mother      Cerebrovascular Disease Mother         Ischemic stroke 3/18 at age 68.     Lymphoma Mother 70        follicular     Lipids Father      Hypertension Father      Circulatory Father         veins stripped      Unknown/Adopted Father         hyperlipidemia      Cataracts Father      Other - See Comments Father         bladder lesions     Blood Disease Sister         FACTOR 5 LEIDEN      Blood Disease Sister         FACTOR 5     Blood Disease Brother         FACTOR 5 LEIDEN HOMOZYGOUS     Breast Cancer Maternal Grandmother 58        contralateral at 74     Diabetes Maternal Grandmother         Type 2     Cerebrovascular Disease Maternal Grandmother      Cardiovascular Maternal Grandmother         MI     Hypertension Maternal Grandmother      Cancer Maternal Grandfather 68        esophageal or stomach?     Substance Abuse Paternal Grandfather      Breast Cancer Maternal Aunt 71     Breast Cancer Maternal Aunt 60      Lymphoma Maternal Aunt 70     Cancer Maternal Aunt 71        vulvar     Pulmonary Embolism Maternal Aunt         bilateral     Cancer Paternal Aunt          at 58; unknown type     Throat cancer Paternal Uncle 55     Brain Cancer Paternal Uncle 61         at 64     Lupus Paternal Uncle        Social History:  Social History     Socioeconomic History     Marital status:      Spouse name: Michael     Number of children: 2     Years of education: Not on file     Highest education level: Not on file   Occupational History     Occupation:       Occupation:     Tobacco Use     Smoking status: Never     Smokeless tobacco: Never   Vaping Use     Vaping Use: Never used   Substance and Sexual Activity     Alcohol use: Yes     Alcohol/week: 1.0 standard drink     Comment: very occasional. 1 to 2x a month I may have a glass of wine     Drug use: No     Sexual activity: Yes     Partners: Male     Birth control/protection: Male Surgical     Comment:  had vasectomy   Other Topics Concern     Parent/sibling w/ CABG, MI or angioplasty before 65F 55M? No   Social History Narrative    Dairy/d 1-2 servings/d. 3 calcium/mag pills    Caffeine 0-1 servings/d    Exercise 3 week  walks     Sunscreen used - Yes    Seatbelts used - Yes    Working smoke/CO detectors in the home - Yes    Guns stored in the home - No    Self Breast Exams - Yes breastfeeding    Self Testicular Exam - NA    Eye Exam up to date YES    Dental Exam up to date - Yes    Pap Smear up to date - Yes    Mammogram up to date - NA    PSA up to date - NA    Dexa Scan up to date - NA    Flex Sig / Colonoscopy up to date - NA    Immunizations up to date - Yes tetanus vac 2004    Abuse: Current or Past(Physical, Sexual or Emotional)- No    Do you feel safe in your environment - Yes    periods Q 31, last 3-4 days    2/7/06  3/31/09    Emily Hernandez RN     Social Determinants of Health     Financial Resource Strain: Not on file   Food Insecurity: Not on  "file   Transportation Needs: Not on file   Physical Activity: Not on file   Stress: Not on file   Social Connections: Not on file   Intimate Partner Violence: Not At Risk     Fear of Current or Ex-Partner: No     Emotionally Abused: No     Physically Abused: No     Sexually Abused: No   Housing Stability: Not on file         Physical Exam:  /86 (BP Location: Right arm, Patient Position: Sitting, Cuff Size: Adult Regular)   Pulse 88   Temp 98  F (36.7  C) (Oral)   Resp 14   Ht 1.769 m (5' 9.65\")   Wt 88.1 kg (194 lb 4.8 oz)   SpO2 97%   BMI 28.16 kg/m      GENERAL APPEARANCE: healthy, alert and no distress     NECK: no adenopathy, no asymmetry or masses     LYMPHATICS: No cervical, supraclavicular, axillary or inguinal lymphadenopathy     RESP: lungs clear to auscultation - no rales, rhonchi or wheezes     BREAST: A multipositional, bilateral breast exam was performed.  Fairly symmetrical -Rsl>L. Right breast: no palpable dominant masses, no nipple discharge, no skin changes. Dense tissue.  Right axilla: no palpable adenopathy. Left breast: no palpable dominant masses, no nipple discharge, no skin changes. Left axilla: no palpable adenopathy. Dense tissue. CARDIOVASCULAR: regular rate and rhythm, normal S1 S2, no S3 or S4 and no murmur. Pulses +1 in all extremities       SKIN: no suspicious lesions or rashes    Laboratory/Imaging Studies  No results found for any visits on 11/18/22.    ASSESSMENT  We discussed that she would like to have a prophylactic mastectomy, considering that she has Factor V Leiden and she would not want to have the risk of having breast cancer and having to deal with surgery and treatment.  She would like a consultation to a surgeon.  I am referring her to Dr. Shayy Kebede for a discussion of the risks and benefits of the surgery. In the meantime, we discussed that a breast MRI would be recommended for her, with the understanding that insurance may or may not cover it entirely. " The breast MRI in high-risk women has a higher sensitivity than mammography, and the combination of mammography and MRI in this population has the highest sensitivity. In a high-risk population, MRI and mammography combined have a higher sensitivity (92.7%) than US and mammography combined (52%) (Source: Iftikhar FERRARA, Matthew Z, Antoine D, et al. Detection of breast cancer with addition of annual screening ultrasound or a single screening MRI to mammography in women with elevated breast cancer risk. MEMO.2012;307(13):8288-5414. ) Therefore, in high-risk women for whom supplemental screening is indicated, MRI is recommended when possible. Screening high-risk women with breast MRI is cost-effective, and the cost-effectiveness of screening MRI increases with increasing breast cancer risk ( Latonia et. al 2006 and King et al. 2009). The National Comprehensive Cancer Society, American Cancer Society and American College of Radiologists recommend breast-screening MRI in high-risk women.    In her case, I have ordered a breast MRI (IMG 1045) using the diagnostic codes:  Breast Screening, high risk patient (Z12.39)  Family history of breast cancer (Z80.3)    She was advised to check with her insurance company and request a cost of care estimate using [employer]'s My Chart function.     She also may benefit from  following  a healthy lifestyle plan recommended by both NCCN and the American Cancer Society that can reduce the risk of breast cancer:  1 Limit alcohol consumption to less than 1 drink per day (1 drink=5 oz.wine, 12 oz. Beer or 1.5 oz. 80-proof liquor).  2. Exercise per American Cancer Society guidelines of at least 150 minutes of moderate-intensity activity or 75 minutes of vigorous activity each week. (Or a combination of both.) Exercise should be spread  out over the week. Regular recreational exercise has been shown to reduce the risk of cancer by 30%.   3. Maintain a healthy weight with a Body Mass Index between  19-24.9. A postmenopausal BMI of 30.7 has been shown to have increased the risk for breast cancer by 37% in some studies.  4. Do not use tobacco products and limit exposure to passive smoke.  5. Breastfeed if possible. Research shows that 16+ months of breastfeeding, over a lifetime, can reduce a woman's risk of breast cancer by up to 27%.    We discussed other prevention websites including the American [place] for Cancer Research (aicr.org) and the Environmental Working Group (ewg.org.)    Individualized Surveillance Plan for women  With 20% or greater lifetime risk of breast cancer   Per NCCN Breast Cancer Screening and Diagnosis Guidelines Version 1.2022   Recommended screening Test or procedure Last done Next Scheduled    Clinical encounter Clinical exam every 6-12 months.   Refer to genetic counseling if not already done.  Consider risk reduction strategies.   11/18/2022 September 2023   However, some family histories with breast cancers at a very young age, may warrant screening starting earlier.    *May begin at age 40 if breast cancers in the family occur at later ages.    Annual mammogram beginning 10 years younger than the earliest breast cancer in the family but not prior to age 30.    Recommend annual breast MRI to begin 10 years younger than the earliest breast cancer in the family but not prior to age 25.    Breast MRIs are preferably done on day 7-15 of the menstrual cycle in premenopausal women. 9/19/2022: Screening tomosynthesis mammogram, BI-RADS 1    No history of breast MRI Breast MRI in March    Next exam: September 2022 followed by mammogram (9/20)   Breast screening for patients at high risk due to thoracic radiation between the ages of 10-30   Annual clinical exam beginning 8 years after radiation therapy.    Annual screening mammogram beginning at age 30 or 8 years after radiation therapy    Annual breast MRI, beginning at age 25 or 8 years after radiation therapy.       NA     NA    Women who have a lifetime risk of >20% based on history of LCIS or ADH/ALH Annual screening mammogram beginning at age of LCIS or ADH/ALH but not prior to age 30.    Consider annual MRI to begin at age of diagnosis of LCIS or ADH/ALH but not prior to age 25.    Consider risk reducing strategies.     NA     NA    Recommend risk reducing strategies for women with 1.7% 5 year risk of breast cancer.         I spent a total of 81 minutes on the day of the visit. Please see the note for further information on patient assessment and treatment.    SWAPNA Choi-CNS, OCN, AGN-BC  Clinical Nurse Specialist  Cancer Risk Management Program  [facility]    CC: SWAPNA Chavez-CNP   Shayy Kebede MD

## 2022-11-18 ENCOUNTER — ONCOLOGY VISIT (OUTPATIENT)
Dept: ONCOLOGY | Facility: CLINIC | Age: 47
End: 2022-11-18
Attending: CLINICAL NURSE SPECIALIST
Payer: COMMERCIAL

## 2022-11-18 VITALS
TEMPERATURE: 98 F | SYSTOLIC BLOOD PRESSURE: 131 MMHG | HEART RATE: 88 BPM | WEIGHT: 194.3 LBS | DIASTOLIC BLOOD PRESSURE: 86 MMHG | BODY MASS INDEX: 27.82 KG/M2 | HEIGHT: 70 IN | OXYGEN SATURATION: 97 % | RESPIRATION RATE: 14 BRPM

## 2022-11-18 DIAGNOSIS — Z12.39 BREAST CANCER SCREENING, HIGH RISK PATIENT: Primary | ICD-10-CM

## 2022-11-18 DIAGNOSIS — Z80.3 FAMILY HISTORY OF MALIGNANT NEOPLASM OF BREAST: ICD-10-CM

## 2022-11-18 PROBLEM — O99.810 ABNORMAL MATERNAL GLUCOSE TOLERANCE, COMPLICATING PREGNANCY, CHILDBIRTH, OR THE PUERPERIUM, UNSPECIFIED AS TO EPISODE OF CARE: Status: ACTIVE | Noted: 2022-11-18

## 2022-11-18 PROCEDURE — G0463 HOSPITAL OUTPT CLINIC VISIT: HCPCS

## 2022-11-18 PROCEDURE — 99205 OFFICE O/P NEW HI 60 MIN: CPT | Performed by: CLINICAL NURSE SPECIALIST

## 2022-11-18 ASSESSMENT — PAIN SCALES - GENERAL: PAINLEVEL: NO PAIN (0)

## 2022-11-18 NOTE — NURSING NOTE
"Oncology Rooming Note    November 18, 2022 2:56 PM   Zayra Altamirano is a 47 year old female who presents for:    Chief Complaint   Patient presents with     Oncology Clinic Visit     NEW - FAMILY HISTORY OF MALIGNANT NEOPLASM OF BREAST     Initial Vitals: /86 (BP Location: Right arm, Patient Position: Sitting, Cuff Size: Adult Regular)   Pulse 88   Temp 98  F (36.7  C) (Oral)   Resp 14   Ht 1.769 m (5' 9.65\")   Wt 88.1 kg (194 lb 4.8 oz)   SpO2 97%   BMI 28.16 kg/m   Estimated body mass index is 28.16 kg/m  as calculated from the following:    Height as of this encounter: 1.769 m (5' 9.65\").    Weight as of this encounter: 88.1 kg (194 lb 4.8 oz). Body surface area is 2.08 meters squared.  No Pain (0) Comment: Data Unavailable   No LMP recorded.  Allergies reviewed: Yes  Medications reviewed: Yes    Medications: Medication refills not needed today.  Pharmacy name entered into EPIC:    [id number] [place] [facility] [address] [id number], [place] - [address]    Clinical concerns: New patient.       Starr Cain CMA            "

## 2022-11-18 NOTE — LETTER
11/18/2022         RE: Zayra Altamirano  [address] [facility] [address]        Dear Colleague,    Thank you for referring your patient, Zayra Altamirano, to the [facility]. Please see a copy of my visit note below.    Oncology Risk Management Consultation:  Date on this visit: 11/18/2022    Zayra Altamirano  is referred by [facility],  for an oncology risk management consultation. She requires high risk screening and surveillance to reduce her risk of cancer secondary to having a family history of breast cancer in her mother and two maternal aunts. She is considered to be at high risk for breast cancer and has a 22.5% lifetime risk for breast cancer by the GREGORIO  model. Also present at this visit is Mayra Mcfarland, DNP Student at [employer].    Primary Physician:  SWAPNA Chavez-CNP    History Of Present Illness:  Ms. Altamirano is a 47 year old female who presents with a family history of breast cancer.    Pertinent history:  homozygous for the factor 5 Leiden mutation and report that she does have a history of PE/DVT.   Menarche at age 11  First child at age 32  Hx of breastfeeding both children  Premenopausal.   LMP: cycles are regular, monthly; some warmth, but not concerning hot flashes  Ovaries, fallopian tubes and uterus in place.   Hx of oral contraceptive use  No hx of  hormone replacement therapy.    Breast density: scattered fibroglandular densities.   No hx of breast biopsy.  No hx of hyperplasia, atypia or malignancy    Genetic testing:  10/25/2022-   Genetic Testing Result: NEGATIVE  Zayra is negative for mutations in the AIP, ALK, APC, DAVID, AXIN2, BAP1, BARD1, BLM, BMPR1A, BRCA1, BRCA2, BRIP1, CASR, CDC73, CDH1, CDK4, CDKN1B, CDKN1C, CDKN2A, CEBPA, CHEK2, CTNNA1, DICER1, DIS3L2, EGFR, EPCAM, FH, FLCN, GATA2, GPC3, GREM1, HOXB13, HRAS, KIT, MAX, MEN1, MET, MITF, MLH1, MSH2, MSH3, MSH6, MUTYH, NBN, NF1, NF2, NTHL1, PALB2, PDGFRA, PHOX2B, PMS2, POLD1,  POLE, POT1, UMUPO2K, PTCH1, PTEN, RAD50, RAD51C, RAD51D, RB1, RECQL4, RET, RUNX1, SDHA, SDHAF2, SDHB, SDHC, SDHD, SMAD4, SMARCA4, SMARCB1, SMARCE1, STK11, SUFU, TERC, TERT, AQAZ021, TP53, TSC1, TSC2, VHL, WRN, and WT1 genes identified using a Multi-Cancer panel testing  from [facility]. This testing was done because of Zayra's family history of cancer.    Pertinent screening history:  6/26/20 16: Baseline screening mammogram, BI-RADS 1  6/26/2017: Screening tomosynthesis mammogram, BI-RADS 1  2/27/2018: Screening tomosynthesis mammogram, BI-RADS 1  2/27/2018: Right ultrasound for palpable lumps, BI-RADS 1  3/13/2019: Screening tomosynthesis mammogram, BI-RADS 1  9/16/2020: Screening tomosynthesis mammogram, BI-RADS 1  9/20/2021: Screening tomosynthesis mammogram, BI-RADS 1  9/19/2022: Screening tomosynthesis mammogram, BI-RADS 1  Most recent colonoscopy in June 2022 (done due to positive Cologuard test) resulted in the removal of two 3-6 mm sessile serrated adenomas from the descending colon and follow-up was recommended in 5-10 years.  Next due: June 2027    At this visit, she denies new fatigue, breast pain, asymmetry, lumps, masses, thickening, nipple discharge and skin changes in her breasts.      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  change goal  INR 1.3-2.0     Rosacea      Past Surgical History:   Procedure Laterality Date     C/SECTION, LOW TRANSVERSE  2008    , Low Transverse      SECTION  2012    Procedure: SECTION; Surgeon:ELY KRAMER; Location:[facility] L+D     COLONOSCOPY N/A 2022    Procedure: COLONOSCOPY, WITH POLYPECTOMY ;  Surgeon: Leventhal, Thomas Michael, MD;  Location: [facility] OR     [facility] EVENT MONITOR ([place]ARDIAC - [place])  10/08    Normal/had palpatiations.      [facility] COLPOSCOPY VULVA W BIOPSY         Allergies:  Allergies as of 2022 - Reviewed 2022   Allergen Reaction Noted     Benadryl allergy Other (See Comments) 2011       Current Medications:  Current Outpatient Medications   Medication Sig Dispense Refill     Acetaminophen (TYLENOL PO) Take 500 mg by mouth as needed       BIOTIN PO Take by mouth daily       rivaroxaban ANTICOAGULANT (XARELTO ANTICOAGULANT) 10 MG TABS tablet Take 1 tablet (10 mg) by mouth daily (with dinner) TAKE 1 TABLET BY MOUTH EVERY DAY WITH DINNER 90 tablet 3     VITAMIN D, CHOLECALCIFEROL, PO Take 1,000 Units by mouth daily          Family History:  Family History   Problem Relation Age of Onset     Breast Cancer Mother 63        IDC , recurrent ; ER+     Hypertension Mother      Cerebrovascular Disease Mother         Ischemic stroke 3/18 at age 68.     Lymphoma Mother 70        follicular     Lipids Father      Hypertension Father      Circulatory Father         veins stripped      Unknown/Adopted Father         hyperlipidemia      Cataracts Father      Other - See Comments Father         bladder lesions     Blood Disease Sister         FACTOR 5 LEIDEN      Blood Disease Sister         FACTOR 5     Blood Disease Brother         FACTOR 5 LEIDEN HOMOZYGOUS     Breast Cancer Maternal Grandmother 58        contralateral at 74     Diabetes Maternal Grandmother         Type 2     Cerebrovascular Disease Maternal Grandmother      Cardiovascular Maternal  Grandmother         MI     Hypertension Maternal Grandmother      Cancer Maternal Grandfather 68        esophageal or stomach?     Substance Abuse Paternal Grandfather      Breast Cancer Maternal Aunt 71     Breast Cancer Maternal Aunt 60     Lymphoma Maternal Aunt 70     Cancer Maternal Aunt 71        vulvar     Pulmonary Embolism Maternal Aunt         bilateral     Cancer Paternal Aunt          at 58; unknown type     Throat cancer Paternal Uncle 55     Brain Cancer Paternal Uncle 61         at 64     Lupus Paternal Uncle        Social History:  Social History     Socioeconomic History     Marital status:      Spouse name: Michael     Number of children: 2     Years of education: Not on file     Highest education level: Not on file   Occupational History     Occupation:       Occupation:     Tobacco Use     Smoking status: Never     Smokeless tobacco: Never   Vaping Use     Vaping Use: Never used   Substance and Sexual Activity     Alcohol use: Yes     Alcohol/week: 1.0 standard drink     Comment: very occasional. 1 to 2x a month I may have a glass of wine     Drug use: No     Sexual activity: Yes     Partners: Male     Birth control/protection: Male Surgical     Comment:  had vasectomy   Other Topics Concern     Parent/sibling w/ CABG, MI or angioplasty before 65F 55M? No   Social History Narrative    Dairy/d 1-2 servings/d. 3 calcium/mag pills    Caffeine 0-1 servings/d    Exercise 3 week  walks     Sunscreen used - Yes    Seatbelts used - Yes    Working smoke/CO detectors in the home - Yes    Guns stored in the home - No    Self Breast Exams - Yes breastfeeding    Self Testicular Exam - NA    Eye Exam up to date YES    Dental Exam up to date - Yes    Pap Smear up to date - Yes    Mammogram up to date - NA    PSA up to date - NA    Dexa Scan up to date - NA    Flex Sig / Colonoscopy up to date - NA    Immunizations up to date - Yes tetanus vac 2004    Abuse: Current or Past(Physical, Sexual  "or Emotional)- No    Do you feel safe in your environment - Yes    periods Q 31, last 3-4 days    2/7/06  3/31/09    Emily Hernandez RN     Social Determinants of Health     Financial Resource Strain: Not on file   Food Insecurity: Not on file   Transportation Needs: Not on file   Physical Activity: Not on file   Stress: Not on file   Social Connections: Not on file   Intimate Partner Violence: Not At Risk     Fear of Current or Ex-Partner: No     Emotionally Abused: No     Physically Abused: No     Sexually Abused: No   Housing Stability: Not on file         Physical Exam:  /86 (BP Location: Right arm, Patient Position: Sitting, Cuff Size: Adult Regular)   Pulse 88   Temp 98  F (36.7  C) (Oral)   Resp 14   Ht 1.769 m (5' 9.65\")   Wt 88.1 kg (194 lb 4.8 oz)   SpO2 97%   BMI 28.16 kg/m      GENERAL APPEARANCE: healthy, alert and no distress     NECK: no adenopathy, no asymmetry or masses     LYMPHATICS: No cervical, supraclavicular, axillary or inguinal lymphadenopathy     RESP: lungs clear to auscultation - no rales, rhonchi or wheezes     BREAST: A multipositional, bilateral breast exam was performed.  Fairly symmetrical -Rsl>L. Right breast: no palpable dominant masses, no nipple discharge, no skin changes. Dense tissue.  Right axilla: no palpable adenopathy. Left breast: no palpable dominant masses, no nipple discharge, no skin changes. Left axilla: no palpable adenopathy. Dense tissue. CARDIOVASCULAR: regular rate and rhythm, normal S1 S2, no S3 or S4 and no murmur. Pulses +1 in all extremities       SKIN: no suspicious lesions or rashes    Laboratory/Imaging Studies  No results found for any visits on 11/18/22.    ASSESSMENT  We discussed that she would like to have a prophylactic mastectomy, considering that she has Factor V Leiden and she would not want to have the risk of having breast cancer and having to deal with surgery and treatment.  She would like a consultation to a surgeon.  I am " referring her to Dr. Shayy Kebede for a discussion of the risks and benefits of the surgery. In the meantime, we discussed that a breast MRI would be recommended for her, with the understanding that insurance may or may not cover it entirely. The breast MRI in high-risk women has a higher sensitivity than mammography, and the combination of mammography and MRI in this population has the highest sensitivity. In a high-risk population, MRI and mammography combined have a higher sensitivity (92.7%) than US and mammography combined (52%) (Source: Iftikhar FERRARA, Matthew NULL, Antoine CORDOBA, et al. Detection of breast cancer with addition of annual screening ultrasound or a single screening MRI to mammography in women with elevated breast cancer risk. MEOM.2012;307(13):2259-4145. ) Therefore, in high-risk women for whom supplemental screening is indicated, MRI is recommended when possible. Screening high-risk women with breast MRI is cost-effective, and the cost-effectiveness of screening MRI increases with increasing breast cancer risk ( Latonia et. al 2006 and King et al. 2009). The National Comprehensive Cancer Society, American Cancer Society and American College of Radiologists recommend breast-screening MRI in high-risk women.    In her case, I have ordered a breast MRI (IMG 1045) using the diagnostic codes:  Breast Screening, high risk patient (Z12.39)  Family history of breast cancer (Z80.3)    She was advised to check with her insurance company and request a cost of care estimate using [employer]'s My Chart function.     She also may benefit from  following  a healthy lifestyle plan recommended by both NCCN and the American Cancer Society that can reduce the risk of breast cancer:  1 Limit alcohol consumption to less than 1 drink per day (1 drink=5 oz.wine, 12 oz. Beer or 1.5 oz. 80-proof liquor).  2. Exercise per American Cancer Society guidelines of at least 150 minutes of moderate-intensity activity or 75 minutes of  vigorous activity each week. (Or a combination of both.) Exercise should be spread  out over the week. Regular recreational exercise has been shown to reduce the risk of cancer by 30%.   3. Maintain a healthy weight with a Body Mass Index between 19-24.9. A postmenopausal BMI of 30.7 has been shown to have increased the risk for breast cancer by 37% in some studies.  4. Do not use tobacco products and limit exposure to passive smoke.  5. Breastfeed if possible. Research shows that 16+ months of breastfeeding, over a lifetime, can reduce a woman's risk of breast cancer by up to 27%.    We discussed other prevention websites including the American [place] [employer] Research (aicr.org) and the Environmental Working Group (ewg.org.)    Individualized Surveillance Plan for women  With 20% or greater lifetime risk of breast cancer   Per NCCN Breast Cancer Screening and Diagnosis Guidelines Version 1.2022   Recommended screening Test or procedure Last done Next Scheduled    Clinical encounter Clinical exam every 6-12 months.   Refer to genetic counseling if not already done.  Consider risk reduction strategies.   11/18/2022 September 2023   However, some family histories with breast cancers at a very young age, may warrant screening starting earlier.    *May begin at age 40 if breast cancers in the family occur at later ages.    Annual mammogram beginning 10 years younger than the earliest breast cancer in the family but not prior to age 30.    Recommend annual breast MRI to begin 10 years younger than the earliest breast cancer in the family but not prior to age 25.    Breast MRIs are preferably done on day 7-15 of the menstrual cycle in premenopausal women. 9/19/2022: Screening tomosynthesis mammogram, BI-RADS 1    No history of breast MRI Breast MRI in March    Next exam: September 2022 followed by mammogram (9/20)   Breast screening for patients at high risk due to thoracic radiation between the ages of 10-30    Annual clinical exam beginning 8 years after radiation therapy.    Annual screening mammogram beginning at age 30 or 8 years after radiation therapy    Annual breast MRI, beginning at age 25 or 8 years after radiation therapy.       NA     NA   Women who have a lifetime risk of >20% based on history of LCIS or ADH/ALH Annual screening mammogram beginning at age of LCIS or ADH/ALH but not prior to age 30.    Consider annual MRI to begin at age of diagnosis of LCIS or ADH/ALH but not prior to age 25.    Consider risk reducing strategies.     NA     NA    Recommend risk reducing strategies for women with 1.7% 5 year risk of breast cancer.         I spent a total of 81 minutes on the day of the visit. Please see the note for further information on patient assessment and treatment.    Ny Bran, SWAPNA-CNS, OCN, AGN-BC  Clinical Nurse Specialist  Cancer Risk Management Program  [facility]    CC: SWAPNA Chavez-CNP   Shayy Kebede MD

## 2022-12-05 NOTE — PROGRESS NOTES
[facility] Breast Surgery Consultation    HPI:   Zayra Altamirano is a 47 year old female who is seen in consultation at the request of Dr. Bran for evaluation of increased lifetime risk of breast cancer.     She was seen by Ny on 11/18/2022. Her GREGORIO score calculator predicts a 22.5% lifetime risk for breast cancer. She had genetic testing which was negative on 10/25/2022. She has not had prior breast surgeries or biopsies. She does have a history of factor V leiden and is on xarelto. She is interested in possible prophylactic mastectomies for risk reduction and here to discuss her options. Her history of factor V leiden does factor into her decision making and her goal of maximum risk reduction as she saw her mother deal with clotting issues related to hormone blockade.  She has a significant family history of breast cancer including her mother, two maternal aunts and maternal grandmother. Genetic testing for pt and her mother as well as one aunt was negative.  She wears a 34H bra.     Hormonal history:   menarche 11,  2 children, 1st at age 32, pre menopausal, 5 years OCP use, no HRT, no fertility treatment.     Family history of breast cancer: Yes - as above  Family history of ovarian cancer:  No  Family history of colon cancer: No  Family history of prostate cancer: No      Past Medical History:   has a past medical history of Congenital deficiency of other clotting factors (12/05), DVT of leg (deep venous thrombosis) (H) (12/2005), Embolism and thrombosis of unspecified site (12/05), Galactorrhea not associated with childbirth (1999), Hemorrhage of gastrointestinal tract, unspecified (3/09 ER), History of gestational diabetes, Homozygous Factor V Leiden mutation (H), Human papillomavirus in conditions classified elsewhere and of unspecified site (2002), Other pulmonary embolism and infarction (12/05), and Rosacea.      Current Outpatient Medications:      Acetaminophen (TYLENOL PO), Take  500 mg by mouth as needed, Disp: , Rfl:      BIOTIN PO, Take by mouth daily, Disp: , Rfl:      rivaroxaban ANTICOAGULANT (XARELTO ANTICOAGULANT) 10 MG TABS tablet, Take 1 tablet (10 mg) by mouth daily (with dinner) TAKE 1 TABLET BY MOUTH EVERY DAY WITH DINNER, Disp: 90 tablet, Rfl: 3     VITAMIN D, CHOLECALCIFEROL, PO, Take 1,000 Units by mouth daily, Disp: , Rfl:     Past Surgical History:  Past Surgical History:   Procedure Laterality Date     C/SECTION, LOW TRANSVERSE  2008    , Low Transverse      SECTION  2012    Procedure: SECTION; Surgeon:ELY KRAMER; Location:[facility] L+D     COLONOSCOPY N/A 2022    Procedure: COLONOSCOPY, WITH POLYPECTOMY ;  Surgeon: Leventhal, Thomas Michael, MD;  [facility]     [facility] EVENT MONITOR ([place]ARDIAC - [place])  10/08    Normal/had palpatiations.      [facility] COLPOSCOPY VULVA W BIOPSY             Allergies   Allergen Reactions     Benadryl Allergy Other (See Comments)     Had paradoxical reaction - made her very hyper         Social History:  Social History     Socioeconomic History     Marital status:      Spouse name: Michael     Number of children: 2     Years of education: Not on file     Highest education level: Not on file   Occupational History     Occupation:       Occupation:     Tobacco Use     Smoking status: Never     Smokeless tobacco: Never   Vaping Use     Vaping Use: Never used   Substance and Sexual Activity     Alcohol use: Yes     Alcohol/week: 1.0 standard drink     Comment: very occasional. 1 to 2x a month I may have a glass of wine     Drug use: No     Sexual activity: Yes     Partners: Male     Birth control/protection: Male Surgical     Comment:  had vasectomy   Other Topics Concern     Parent/sibling w/ CABG, MI or angioplasty before 65F 55M? No   Social History Narrative    Dairy/d 1-2 servings/d. 3 calcium/mag pills    Caffeine 0-1 servings/d    Exercise 3 week  walks     Sunscreen used - Yes  "   Seatbelts used - Yes    Working smoke/CO detectors in the home - Yes    Guns stored in the home - No    Self Breast Exams - Yes breastfeeding    Self Testicular Exam - NA    Eye Exam up to date YES    Dental Exam up to date - Yes    Pap Smear up to date - Yes    Mammogram up to date - NA    PSA up to date - NA    Dexa Scan up to date - NA    Flex Sig / Colonoscopy up to date - NA    Immunizations up to date - Yes tetanus vac 2004    Abuse: Current or Past(Physical, Sexual or Emotional)- No    Do you feel safe in your environment - Yes    periods Q 31, last 3-4 days    2/7/06  3/31/09    Emily Hernandez RN     Social Determinants of Health     Financial Resource Strain: Not on file   Food Insecurity: Not on file   Transportation Needs: Not on file   Physical Activity: Not on file   Stress: Not on file   Social Connections: Not on file   Intimate Partner Violence: Not At Risk     Fear of Current or Ex-Partner: No     Emotionally Abused: No     Physically Abused: No     Sexually Abused: No   Housing Stability: Not on file        ROS:  The 10 point review of systems is negative other than noted in the HPI and above.    PE:  Vitals: /70   Pulse 75   Ht 1.778 m (5' 10\")   Wt 88 kg (194 lb)   BMI 27.84 kg/m    General appearance: well-nourished, sitting comfortably, no apparent distress  Psych: normal affect, pleasant  HEENT:  Head normocephalic and atraumatic, pupils equal and round, conjunctivae clear, mucous membranes moist, external ears and nose normal  Neck: Supple without thyromegaly or masses  Lungs: Respirations unlabored  Lymphatic: No cervical, or supraclavicular lymphadenopathy  Extremities: Without edema  Musculoskeletal:  Normal station and gait  Neurologic: nonfocal, grossly intact times four extremities, alert and oriented times three  Psychiatric: Mood and affect are appropriate  Skin: Without lesions or rashes    Breast:  A bilateral breast exam was performed in the supine position.. " Bilateral breasts were palpated in a circumferential clockwise fashion including the supraclavicular and axillary areas.   Breasts are large and pendulous with nipples everted bilaterally. Skin is normal. By palpation, tissue is heterogeneously dense. There are no discrete masses.       Lymph:       No supraclavicular/infraclavicular adenopathy.   Axillary adenopathy: none    Assessment/Plan: Zayra Altamirano is a 47 year old who presents with increased lifetime risk of breast cancer based on family history. Her GREGORIO score is calculated to be 22%. We discussed her options of high risk screening vs prophylactic mastectomies with or without reconstruction. She has done a great deal of research into reconstruction options and is interested in [place] mastectomies. She has appts scheduled with both Dr. Cano and Dr. Esteban to discuss [place] reconstruction. We reviewed the risks, benefits, indications and alternatives to surgery. She understands she would be at increased risk of perioperative complications related to factor V leiden. I would have her discuss pre operative plan for bridging if indicated with her hematologist prior to surgery. Ideally would hold xarelto 48 hrs prior and 24 hrs post operatively with or without lovenox bridging. We discussed that with the [place] option for reconstruction, I would not recommend nipple sparing mastectomies and would recommend skin sparing. She can discuss grafting options with plastic surgery if she is interested. She has thought through her options at length and very much is interested in prophylactic mastectomies at this point. After she meets with plastic surgery, she will let us know how she would like to proceed.       45 minutes total time spent on the date of this encounter doing: chart review, review of test results, patient visit, physical exam, education, counseling, developing plan of care, and documenting.    Shayy Kebede MD      Please route or  send letter to:  Primary Care Provider (PCP) and Referring Provider

## 2022-12-06 ENCOUNTER — OFFICE VISIT (OUTPATIENT)
Dept: SURGERY | Facility: CLINIC | Age: 47
End: 2022-12-06
Payer: COMMERCIAL

## 2022-12-06 VITALS
SYSTOLIC BLOOD PRESSURE: 100 MMHG | HEART RATE: 75 BPM | HEIGHT: 70 IN | BODY MASS INDEX: 27.77 KG/M2 | DIASTOLIC BLOOD PRESSURE: 70 MMHG | WEIGHT: 194 LBS

## 2022-12-06 DIAGNOSIS — Z12.39 BREAST CANCER SCREENING, HIGH RISK PATIENT: ICD-10-CM

## 2022-12-06 DIAGNOSIS — Z80.3 FAMILY HISTORY OF MALIGNANT NEOPLASM OF BREAST: ICD-10-CM

## 2022-12-06 PROCEDURE — 99204 OFFICE O/P NEW MOD 45 MIN: CPT | Performed by: SURGERY

## 2022-12-06 RX ORDER — MULTIVIT,TX WITH IRON,MINERALS
500 TABLET, EXTENDED RELEASE ORAL 2 TIMES DAILY
COMMUNITY
End: 2022-12-06

## 2022-12-06 NOTE — NURSING NOTE
Breast Patients    BREAST PATIENTS (ALL)    1-Do you have any of the following symptoms? Other: Family hx of breast cancer   2-In which breast are you having the symptoms? N/A  3-Have you had a Mammogram? Other Location:  [facility] [place]    -  Date:  9/19/22  4-Have you ever had a breast cyst drained? No  5-Have you ever had a breast biopsy? No  6-Have you ever had a Breast Cancer? No   7-Is there a history of Breast Cancer in your family? Yes   Relationship to you:    Mother, Grandmother, Aunt  8-Have you ever had Ovarian Cancer? No  9-Is there a history of Ovarian Cancer in your family? No  10-Summarize your caffeine intake (i.e. coffee, tea, chocolate, soda etc.): 1-2 cups per day     BREAST PATIENTS (FEMALE)    11-What age did your periods begin? 11  12-Date your last menstrual period began? 11/30/22  13-Number of full-term pregnancies: 2  14-Your age when your first child was born? 32  15-Did you nurse your children? Yes  16-Are you pregnant now? No  17-Have you begun menopause? No  18-Have you had either ovary removed?No  19-Do you have breast implants? No   20-Do you use hormone replacement therapy?  No  21-Have you taken oral contraceptive pills?  Yes, For how many years?  Maybe 5+. Was never consistent   22-Have you had an intrauterine device (IUD) placed?  No  23-What is your current bra size?  34    Stacy Lopez MA

## 2023-01-02 ENCOUNTER — OFFICE VISIT (OUTPATIENT)
Dept: FAMILY MEDICINE | Facility: CLINIC | Age: 48
End: 2023-01-02
Payer: COMMERCIAL

## 2023-01-02 VITALS
OXYGEN SATURATION: 96 % | HEIGHT: 70 IN | HEART RATE: 74 BPM | TEMPERATURE: 97.4 F | SYSTOLIC BLOOD PRESSURE: 110 MMHG | RESPIRATION RATE: 16 BRPM | DIASTOLIC BLOOD PRESSURE: 70 MMHG | WEIGHT: 197.4 LBS | BODY MASS INDEX: 28.26 KG/M2

## 2023-01-02 DIAGNOSIS — Z13.29 SCREENING FOR THYROID DISORDER: ICD-10-CM

## 2023-01-02 DIAGNOSIS — Z13.21 ENCOUNTER FOR VITAMIN DEFICIENCY SCREENING: ICD-10-CM

## 2023-01-02 DIAGNOSIS — Z13.0 SCREENING FOR IRON DEFICIENCY ANEMIA: ICD-10-CM

## 2023-01-02 DIAGNOSIS — Z00.00 ROUTINE GENERAL MEDICAL EXAMINATION AT A HEALTH CARE FACILITY: Primary | ICD-10-CM

## 2023-01-02 DIAGNOSIS — D68.51 ACTIVATED PROTEIN C RESISTANCE (H): ICD-10-CM

## 2023-01-02 DIAGNOSIS — Z13.6 CARDIOVASCULAR SCREENING; LDL GOAL LESS THAN 100: ICD-10-CM

## 2023-01-02 DIAGNOSIS — I26.99 OTHER PULMONARY EMBOLISM WITHOUT ACUTE COR PULMONALE, UNSPECIFIED CHRONICITY (H): ICD-10-CM

## 2023-01-02 DIAGNOSIS — Z13.1 SCREENING FOR DIABETES MELLITUS: ICD-10-CM

## 2023-01-02 LAB
ALBUMIN SERPL BCG-MCNC: 4.3 G/DL (ref 3.5–5.2)
ALP SERPL-CCNC: 54 U/L (ref 35–104)
ALT SERPL W P-5'-P-CCNC: 12 U/L (ref 10–35)
ANION GAP SERPL CALCULATED.3IONS-SCNC: 11 MMOL/L (ref 7–15)
AST SERPL W P-5'-P-CCNC: 24 U/L (ref 10–35)
BILIRUB SERPL-MCNC: 0.3 MG/DL
BUN SERPL-MCNC: 11.5 MG/DL (ref 6–20)
CALCIUM SERPL-MCNC: 9.2 MG/DL (ref 8.6–10)
CHLORIDE SERPL-SCNC: 102 MMOL/L (ref 98–107)
CHOLEST SERPL-MCNC: 227 MG/DL
CREAT SERPL-MCNC: 0.82 MG/DL (ref 0.51–0.95)
DEPRECATED CALCIDIOL+CALCIFEROL SERPL-MC: 31 UG/L (ref 20–75)
DEPRECATED HCO3 PLAS-SCNC: 27 MMOL/L (ref 22–29)
ERYTHROCYTE [DISTWIDTH] IN BLOOD BY AUTOMATED COUNT: 12.6 % (ref 10–15)
GFR SERPL CREATININE-BSD FRML MDRD: 88 ML/MIN/1.73M2
GLUCOSE SERPL-MCNC: 98 MG/DL (ref 70–99)
HBA1C MFR BLD: 5.2 % (ref 0–5.6)
HCT VFR BLD AUTO: 43.6 % (ref 35–47)
HDLC SERPL-MCNC: 56 MG/DL
HGB BLD-MCNC: 14.5 G/DL (ref 11.7–15.7)
LDLC SERPL CALC-MCNC: 133 MG/DL
MCH RBC QN AUTO: 29.8 PG (ref 26.5–33)
MCHC RBC AUTO-ENTMCNC: 33.3 G/DL (ref 31.5–36.5)
MCV RBC AUTO: 90 FL (ref 78–100)
NONHDLC SERPL-MCNC: 171 MG/DL
PLATELET # BLD AUTO: 275 10E3/UL (ref 150–450)
POTASSIUM SERPL-SCNC: 4.3 MMOL/L (ref 3.4–5.3)
PROT SERPL-MCNC: 6.7 G/DL (ref 6.4–8.3)
RBC # BLD AUTO: 4.87 10E6/UL (ref 3.8–5.2)
SODIUM SERPL-SCNC: 140 MMOL/L (ref 136–145)
TRIGL SERPL-MCNC: 190 MG/DL
TSH SERPL DL<=0.005 MIU/L-ACNC: 2.75 UIU/ML (ref 0.3–4.2)
WBC # BLD AUTO: 6.4 10E3/UL (ref 4–11)

## 2023-01-02 PROCEDURE — 36415 COLL VENOUS BLD VENIPUNCTURE: CPT | Performed by: NURSE PRACTITIONER

## 2023-01-02 PROCEDURE — 85027 COMPLETE CBC AUTOMATED: CPT | Performed by: NURSE PRACTITIONER

## 2023-01-02 PROCEDURE — 82306 VITAMIN D 25 HYDROXY: CPT | Performed by: NURSE PRACTITIONER

## 2023-01-02 PROCEDURE — 99396 PREV VISIT EST AGE 40-64: CPT | Performed by: NURSE PRACTITIONER

## 2023-01-02 PROCEDURE — 84443 ASSAY THYROID STIM HORMONE: CPT | Performed by: NURSE PRACTITIONER

## 2023-01-02 PROCEDURE — 80053 COMPREHEN METABOLIC PANEL: CPT | Performed by: NURSE PRACTITIONER

## 2023-01-02 PROCEDURE — 80061 LIPID PANEL: CPT | Performed by: NURSE PRACTITIONER

## 2023-01-02 PROCEDURE — 83036 HEMOGLOBIN GLYCOSYLATED A1C: CPT | Performed by: NURSE PRACTITIONER

## 2023-01-02 ASSESSMENT — ENCOUNTER SYMPTOMS
HEMATURIA: 0
MYALGIAS: 0
ABDOMINAL PAIN: 0
FEVER: 0
CONSTIPATION: 0
BREAST MASS: 0
SORE THROAT: 0
NAUSEA: 0
CHILLS: 0
ARTHRALGIAS: 0
COUGH: 0
NERVOUS/ANXIOUS: 0
WEAKNESS: 0
HEADACHES: 0
JOINT SWELLING: 0
DIZZINESS: 0
PALPITATIONS: 0
HEMATOCHEZIA: 0
SHORTNESS OF BREATH: 0
HEARTBURN: 0
PARESTHESIAS: 0
FREQUENCY: 0
EYE PAIN: 0
DYSURIA: 0
DIARRHEA: 0

## 2023-01-02 NOTE — PROGRESS NOTES
SUBJECTIVE:   CC: Zayra is an 47 year old who presents for preventive health visit.   Patient has been advised of split billing requirements and indicates understanding: Yes  Healthy Habits:     Getting at least 3 servings of Calcium per day:  Yes    Bi-annual eye exam:  Yes    Dental care twice a year:  Yes    Sleep apnea or symptoms of sleep apnea:  None    Diet:  Regular (no restrictions)    Frequency of exercise:  6-7 days/week    Duration of exercise:  30-45 minutes    Taking medications regularly:  Yes    Medication side effects:  None    PHQ-2 Total Score: 0    Additional concerns today:  Yes          PROBLEMS TO ADD ON...  Plans to do prophylactic mastectomy.  Has followed with oncology and plastics.  Has appointment at [facility], hoping to do a nerve sparing procedure.      Today's PHQ-2 Score:   PHQ-2 ( 1999 [employer]) 1/2/2023   Q1: Little interest or pleasure in doing things 0   Q2: Feeling down, depressed or hopeless 0   PHQ-2 Score 0   PHQ-2 Total Score (12-17 Years)- Positive if 3 or more points; Administer PHQ-A if positive -   Q1: Little interest or pleasure in doing things Not at all   Q2: Feeling down, depressed or hopeless Not at all   PHQ-2 Score 0           Social History     Tobacco Use     Smoking status: Never     Smokeless tobacco: Never   Substance Use Topics     Alcohol use: Yes     Alcohol/week: 1.0 standard drink     Comment: very occasional. 1 to 2x a month I may have a glass of wine         Alcohol Use 1/2/2023   Prescreen: >3 drinks/day or >7 drinks/week? No   Prescreen: >3 drinks/day or >7 drinks/week? -       Reviewed orders with patient.  Reviewed health maintenance and updated orders accordingly - Yes  Lab work is in process    Breast Cancer Screening:    FHS-7:   Breast CA Risk Assessment (FHS-7) 9/20/2021 12/4/2021 9/19/2022   Did any of your first-degree relatives have breast or ovarian cancer? Yes Yes Yes   Did any of your relatives have bilateral breast cancer? No No No   Did any  man in your family have breast cancer? No No No   Did any woman in your family have breast and ovarian cancer? No Yes No   Did any woman in your family have breast cancer before age 50 y? No No No   Do you have 2 or more relatives with breast and/or ovarian cancer? Yes Yes Yes   Do you have 2 or more relatives with breast and/or bowel cancer? No No Yes       Mammogram Screening: Recommended annual mammography  Pertinent mammograms are reviewed under the imaging tab.    History of abnormal Pap smear: NO - age 30-65 PAP every 5 years with negative HPV co-testing recommended  PAP / HPV Latest Ref Rng & Units 10/12/2020 4/19/2017 3/20/2014   PAP (Historical) - NIL NIL NIL   HPV16 NEG:Negative Negative Negative -   HPV18 NEG:Negative Negative Negative -   HRHPV NEG:Negative Negative Negative -     Reviewed and updated as needed this visit by clinical staff    Allergies  Meds              Reviewed and updated as needed this visit by Provider                     Review of Systems   Constitutional: Negative for chills and fever.   HENT: Negative for congestion, ear pain, hearing loss and sore throat.    Eyes: Negative for pain and visual disturbance.   Respiratory: Negative for cough and shortness of breath.    Cardiovascular: Negative for chest pain, palpitations and peripheral edema.   Gastrointestinal: Negative for abdominal pain, constipation, diarrhea, heartburn, hematochezia and nausea.   Breasts:  Negative for tenderness, breast mass and discharge.   Genitourinary: Negative for dysuria, frequency, genital sores, hematuria, pelvic pain, urgency, vaginal bleeding and vaginal discharge.   Musculoskeletal: Negative for arthralgias, joint swelling and myalgias.   Skin: Negative for rash.   Neurological: Negative for dizziness, weakness, headaches and paresthesias.   Psychiatric/Behavioral: Negative for mood changes. The patient is not nervous/anxious.           OBJECTIVE:   /70 (BP Location: Left arm, Patient  "Position: Sitting, Cuff Size: Adult Large)   Pulse 74   Temp 97.4  F (36.3  C) (Temporal)   Resp 16   Ht 1.775 m (5' 9.88\")   Wt 89.5 kg (197 lb 6.4 oz)   LMP 12/27/2022 (Exact Date)   SpO2 96%   BMI 28.42 kg/m    Physical Exam  GENERAL: healthy, alert and no distress  EYES: Eyes grossly normal to inspection, PERRL and conjunctivae and sclerae normal  HENT: ear canals and TM's normal, nose and mouth without ulcers or lesions  NECK: no adenopathy, no asymmetry, masses, or scars and thyroid normal to palpation  RESP: lungs clear to auscultation - no rales, rhonchi or wheezes  BREAST: normal without masses, tenderness or nipple discharge and no palpable axillary masses or adenopathy  CV: regular rate and rhythm, normal S1 S2, no S3 or S4, no murmur, click or rub, no peripheral edema and peripheral pulses strong  ABDOMEN: soft, nontender, no hepatosplenomegaly, no masses and bowel sounds normal  MS: no gross musculoskeletal defects noted, no edema  SKIN: no suspicious lesions or rashes  NEURO: Normal strength and tone, mentation intact and speech normal  PSYCH: mentation appears normal, affect normal/bright        ASSESSMENT/PLAN:   (Z00.00) Routine general medical examination at a health care facility  (primary encounter diagnosis)  Comment: Reviewed medical/social/family history and health maintenance  Plan:     (I26.99) Other pulmonary embolism without acute cor pulmonale, unspecified chronicity (H)  Comment: On DOAC.    Plan:     (D68.51) Activated protein C resistance (H)  Comment: On DOAC for previous PE.  Plan:     (Z13.1) Screening for diabetes mellitus  Comment:   Plan: Comprehensive metabolic panel (BMP + Alb, Alk         Phos, ALT, AST, Total. Bili, TP), Hemoglobin         A1c            (Z13.29) Screening for thyroid disorder  Comment:   Plan: TSH with free T4 reflex            (Z13.0) Screening for iron deficiency anemia  Comment:   Plan: CBC with platelets            (Z13.21) Encounter for vitamin " deficiency screening  Comment:   Plan: Vitamin D Deficiency            (Z13.6) CARDIOVASCULAR SCREENING; LDL GOAL LESS THAN 100  Comment:   Plan: Lipid panel reflex to direct LDL Fasting              Patient has been advised of split billing requirements and indicates understanding: Yes      COUNSELING:  Reviewed preventive health counseling, as reflected in patient instructions        She reports that she has never smoked. She has never used smokeless tobacco.          SWAPNA Merida [facility]

## 2023-01-04 NOTE — PROGRESS NOTES
[facility]  [address] [place], [place] [id number]  Main: [phone], Fax: [phone]    Patient seen at: [facility] at [address] [place] [address]    Outpatient Visit Note:    Patient: Zayra Altamirano  MRN: [id number]  : 1975  TIMBO: 2023    Reason:  History of estrogen provoked venous thromboembolism. Homozygous factor V Leiden mutation. Chronic anticoagulation therapy. Here for routine annual follow up visit.      Clinical History Summary:  Zayra is a 47 year old female with a history of estrogen provoked venous thromboembolism who is found to have homozygous factor V Leiden mutation, currently on chronic anticoagulation therapy with Xarelto at 10 mg PO Qday dosing, here for her routine yearly follow up clinic visit.      Thrombosis History Summary:    Dec 2005, she was using NuvaRing for about one year when she presented with left leg DVT and bilateral pulmonary embolism. She was then found to have homozygous factor V Leiden mutation which became the basis for her staying on indefinite anticoagulation therapy. She was on coumadin and switch to rivaroxaban at 10 mg PO Qday dosing back in 2019 after her visit with Dr. Nagy, staff hematologist at this clinic (who has since left the practice) in 2018.      Interim History:  She is currently on rivaroxaban at 10 mg PO Qday dosing. Denies any issues with bleeding.    In 2022 she underwent colonoscopy for which this writer instructed her to hold rivaroxaban for 48 hours prior to the procedure and restart rivaroxaban the day following her procedure.     Today, Zayra is inquiring about getting prophylactic mastectomy done due to strong family history of breast cancer. She has already met with Dr. Kebede, surgeon here at the [facility] in regard to this. Zayra is in the process of getting a second opinion at [facility] as they have the ability to  perform nerves sparing mastectomy there. According to Zayra, the bilateral mastectomy and reconstruction of her breast tissue will all be performed at the same surgery and likely she will need overnight observation after the surgery. Zayra would like to get this done before Summer 2023.     ROS:  Denies any bleeding complications. Specifically, no frequent epistaxis. No issues with oral mucosal bleeding. Denies any hematuria or blood in stools. Denies any shortness of breath. No chest pain. No cough. No fever.    Medications, Allergies and PmHx:  All are reviewed by this writer today via electronic medical records    Social History:   Deferred.    Family History:  Deferred.    Objective:  Vitals: /81 (BP Location: Right arm, Patient Position: Sitting, Cuff Size: Adult Regular)   Pulse 79   Temp 98.1  F (36.7  C) (Oral)   Wt 90.3 kg (199 lb)   LMP 12/27/2022 (Exact Date)   SpO2 98%   BMI 28.65 kg/m    Exam:   Complete exam is not performed today.     Labs:  Component      Latest Ref Rng & Units 1/2/2023   Sodium      136 - 145 mmol/L 140   Potassium      3.4 - 5.3 mmol/L 4.3   Chloride      98 - 107 mmol/L 102   Carbon Dioxide (CO2)      22 - 29 mmol/L 27   Anion Gap      7 - 15 mmol/L 11   Urea Nitrogen      6.0 - 20.0 mg/dL 11.5   Creatinine      0.51 - 0.95 mg/dL 0.82   Calcium      8.6 - 10.0 mg/dL 9.2   Glucose      70 - 99 mg/dL 98   Alkaline Phosphatase      35 - 104 U/L 54   AST      10 - 35 U/L 24   ALT      10 - 35 U/L 12   Protein Total      6.4 - 8.3 g/dL 6.7   Albumin      3.5 - 5.2 g/dL 4.3   Bilirubin Total      <=1.2 mg/dL 0.3   GFR Estimate      >60 mL/min/1.73m2 88   WBC      4.0 - 11.0 10e3/uL 6.4   RBC Count      3.80 - 5.20 10e6/uL 4.87   Hemoglobin      11.7 - 15.7 g/dL 14.5   Hematocrit      35.0 - 47.0 % 43.6   MCV      78 - 100 fL 90   MCH      26.5 - 33.0 pg 29.8   MCHC      31.5 - 36.5 g/dL 33.3   RDW      10.0 - 15.0 % 12.6   Platelet Count      150 - 450 10e3/uL 275      Assessment:  In summary, Zayra is a 46 year old female with a history of estrogen provoked pulmonary embolism back in 2005, who is also found to have homozygous factor V Leiden mutation, currently on long term anticoagulation therapy with Xarelto at 10 mg PO Qday, returns to clinic today for her annual routine follow up visit with this clinic. Zayra has done well since she switched from Coumadin to Xarelto back in Feb 2019. She has no issues with bleeding or any recurrent venous thromboembolic events.     She is doing well on rivaroxaban at 10 mg PO Qday.      Diagnosis:  1. History of estrogen provoked pulmonary embolism and lower extremity DVT back in 2005.  2. Homozygous factor V Leiden Mutation.  3. Chronic anticoagulation therapy with rivaroxaban at 10 mg PO Qday.   4. Family history of breast cancer. Contemplating prophylactic double mastectomy before summer 2023.     Plan:  Zayra remains to be a good candidate to stay on indefinite anticoagulation therapy. She is doing well on rivaroxaban at 10 mg PO Qday and thus I will keep her on this.     As mentioned, Zayra is contemplating to proceed with prophylactic double mastectomy before Summer 2023. I discussed with Zayra today that rivaroxaban management for the surgery is rather simple. She can simply stop her rivaroxaban for 24-48 hours prior to her surgery. Then she can restart her rivaroxaban the day after her surgery as long as she has no bleeding complications.     I have provided her with an one year refill of rivaroxaban today.     Patient is instructed to call our clinic if she should experience any unusual bleeding complications or if she should need any invasive or surgical procedures in the future. Otherwise, will plan to see her back in one year for routine follow up.         Miky Hanson PA-C, MPAS  Physician Assistant  [facility].     25 minutes spent on the date of the encounter  doing chart review, history and exam, documentation and further activities per the note.    Time IN: 11:07  Time OUT: 11:25

## 2023-01-05 ENCOUNTER — OFFICE VISIT (OUTPATIENT)
Dept: HEMATOLOGY | Facility: CLINIC | Age: 48
End: 2023-01-05
Attending: PHYSICIAN ASSISTANT
Payer: COMMERCIAL

## 2023-01-05 VITALS
BODY MASS INDEX: 28.65 KG/M2 | DIASTOLIC BLOOD PRESSURE: 81 MMHG | SYSTOLIC BLOOD PRESSURE: 124 MMHG | HEART RATE: 79 BPM | TEMPERATURE: 98.1 F | OXYGEN SATURATION: 98 % | WEIGHT: 199 LBS

## 2023-01-05 DIAGNOSIS — Z86.718 HISTORY OF VENOUS THROMBOEMBOLISM: ICD-10-CM

## 2023-01-05 DIAGNOSIS — Z79.01 CHRONIC ANTICOAGULATION: Primary | ICD-10-CM

## 2023-01-05 PROCEDURE — 99211 OFF/OP EST MAY X REQ PHY/QHP: CPT | Performed by: PHYSICIAN ASSISTANT

## 2023-01-05 PROCEDURE — 99214 OFFICE O/P EST MOD 30 MIN: CPT | Performed by: PHYSICIAN ASSISTANT

## 2023-01-12 NOTE — PROGRESS NOTES
Patient called and requested that her Rx from this visit be sent to [facility] instead of [facility].    Nancy GREENN, RN, PHN   [facility]   Office: [phone]  Fax: [phone]

## 2023-02-03 ENCOUNTER — TRANSFERRED RECORDS (OUTPATIENT)
Dept: HEALTH INFORMATION MANAGEMENT | Facility: CLINIC | Age: 48
End: 2023-02-03

## 2023-02-18 ENCOUNTER — MYC MEDICAL ADVICE (OUTPATIENT)
Dept: SURGERY | Facility: CLINIC | Age: 48
End: 2023-02-18
Payer: COMMERCIAL

## 2023-02-27 ENCOUNTER — TELEPHONE (OUTPATIENT)
Dept: OTHER | Facility: CLINIC | Age: 48
End: 2023-02-27
Payer: COMMERCIAL

## 2023-02-27 NOTE — TELEPHONE ENCOUNTER
[facility]    Who is the name of the provider?:  Lyudmila    What is the location you see this provider at/preferred location?: [place]  Person calling / Facility: Zayra Adarsh  Phone number:  [phone]  Nurse call back needed:  NO     Reason for call:   Patient wanting to be seen for Factor 5 risk prior to surgery and separately, for advice with varicosities. Patient needing to be seen as soon possible and OK with another provider.    Pharmacy location:  n/a  Outside Imaging: Not Applicable   Can we leave a detailed message on this number?  YES

## 2023-02-28 NOTE — TELEPHONE ENCOUNTER
Future Appointments   Date Time Provider Department Center   3/27/2023  3:10 PM Gopi Coreas MD [facility]

## 2023-03-02 ENCOUNTER — OFFICE VISIT (OUTPATIENT)
Dept: FAMILY MEDICINE | Facility: CLINIC | Age: 48
End: 2023-03-02
Payer: COMMERCIAL

## 2023-03-02 VITALS
BODY MASS INDEX: 28.36 KG/M2 | HEART RATE: 90 BPM | TEMPERATURE: 98.1 F | WEIGHT: 198.1 LBS | DIASTOLIC BLOOD PRESSURE: 84 MMHG | HEIGHT: 70 IN | OXYGEN SATURATION: 97 % | SYSTOLIC BLOOD PRESSURE: 128 MMHG | RESPIRATION RATE: 16 BRPM

## 2023-03-02 DIAGNOSIS — Z86.718 HISTORY OF DVT OF LOWER EXTREMITY: ICD-10-CM

## 2023-03-02 DIAGNOSIS — I26.99 PULMONARY EMBOLISM AND INFARCTION (H): ICD-10-CM

## 2023-03-02 DIAGNOSIS — D68.51 HOMOZYGOUS FACTOR V LEIDEN MUTATION (H): ICD-10-CM

## 2023-03-02 DIAGNOSIS — Z01.818 PREOP GENERAL PHYSICAL EXAM: Primary | ICD-10-CM

## 2023-03-02 DIAGNOSIS — Z80.3 FAMILY HISTORY OF MALIGNANT NEOPLASM OF BREAST: ICD-10-CM

## 2023-03-02 PROCEDURE — 99214 OFFICE O/P EST MOD 30 MIN: CPT | Performed by: NURSE PRACTITIONER

## 2023-03-02 ASSESSMENT — PAIN SCALES - GENERAL: PAINLEVEL: NO PAIN (0)

## 2023-03-02 NOTE — PROGRESS NOTES
[address]  [address]  [place] [address]  Phone: [phone]  Fax: [phone]  Primary Provider: Allie Cotter  Pre-op Performing Provider: ALLIE COTTER      PREOPERATIVE EVALUATION:  Today's date: 3/2/2023    Zayra Altamirano is a 47 year old female who presents for a preoperative evaluation.    Surgical Information:  Surgery/Procedure: BILATERAL SKIN SPARING MASTECTOMY WITH GOLDILOCKS CLOSURE  Surgery Location: [facility]  Surgeon: Kristal Zhou  Surgery Date: 03/14/2023  Time of Surgery: 8:00AM  Where patient plans to recover: At home with family   Fax number for surgical facility: [phone]    Type of Anesthesia Anticipated: General    Assessment & Plan     The proposed surgical procedure is considered INTERMEDIATE risk.    Preop general physical exam  Reviewed medical/social/family history and health maintenance    Family history of malignant neoplasm of breast      Homozygous Factor V Leiden mutation (H)  History of DVT and PE.  On Xarelto.      Pulmonary embolism and infarction (H)  On Xarelto.      History of DVT of lower extremity  On Xarelto                RECOMMENDATION:  APPROVAL GIVEN to proceed with proposed procedure, without further diagnostic evaluation.  Should stop Xarelto 5 days prior to procedure and resume post operatively      Subjective     HPI related to upcoming procedure:  BILATERAL SKIN SPARING MASTECTOMY WITH GOLDILOCKS CLOSURE    Preop Questions 2/27/2023   1. Have you ever had a heart attack or stroke? No   2. Have you ever had surgery on your heart or blood vessels, such as a stent placement, a coronary artery bypass, or surgery on an artery in your head, neck, heart, or legs? No   3. Do you have chest pain with activity? No   4. Do you have a history of  heart failure? No   5. Do you currently have a cold, bronchitis or symptoms of other infection? No   6. Do you have a cough, shortness of breath, or wheezing? No   7.  Do you or anyone in your family have previous history of blood clots? YES - Factor 5, on eliquis   8. Do you or does anyone in your family have a serious bleeding problem such as prolonged bleeding following surgeries or cuts? No   9. Have you ever had problems with anemia or been told to take iron pills? No   10. Have you had any abnormal blood loss such as black, tarry or bloody stools, or abnormal vaginal bleeding? No   11. Have you ever had a blood transfusion? No   12. Are you willing to have a blood transfusion if it is medically needed before, during, or after your surgery? Yes   13. Have you or any of your relatives ever had problems with anesthesia? No   14. Do you have sleep apnea, excessive snoring or daytime drowsiness? UNKNOWN -    15. Do you have any artifical heart valves or other implanted medical devices like a pacemaker, defibrillator, or continuous glucose monitor? No   16. Do you have artificial joints? No   17. Are you allergic to latex? No   18. Is there any chance that you may be pregnant? No       Health Care Directive:  Patient does not have a Health Care Directive or Living Will: Discussed advance care planning with patient; information given to patient to review.    Preoperative Review of :   reviewed - no record of controlled substances prescribed.          Review of Systems  Constitutional, neuro, ENT, endocrine, pulmonary, cardiac, gastrointestinal, genitourinary, musculoskeletal, integument and psychiatric systems are negative, except as otherwise noted.    Patient Active Problem List    Diagnosis Date Noted     History of gestational diabetes 10/19/2011     Priority: High     Consider testing at 20w       Abnormal maternal glucose tolerance, complicating pregnancy, childbirth, or the puerperium, unspecified as to episode of care 11/18/2022     Priority: Medium     Formatting of this note might be different from the original.  Created by Conversion       Homozygous Factor V  Leiden mutation (H) 2017     Priority: Medium     Other pulmonary embolism without acute cor pulmonale, unspecified chronicity (H) 2017     Priority: Medium     Anxiety about health 2017     Priority: Medium     Activated protein C resistance (H) 2017     Priority: Medium     Long term current use of anticoagulant therapy 05/10/2016     Priority: Medium     Ankle pain, left 05/10/2016     Priority: Medium     Ankle pain, right 05/10/2016     Priority: Medium     CARDIOVASCULAR SCREENING; LDL GOAL LESS THAN 160 2012     Priority: Medium     Embolism and thrombosis (H) 2005     Priority: Medium     left  Problem list name updated by automated process. Provider to review       Pulmonary embolism and infarction (H) 2005     Priority: Medium     Problem list name updated by automated process. 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  change goal INR 1.3-2.0     Rosacea      Past Surgical History:   Procedure Laterality Date     C/SECTION, LOW TRANSVERSE  2008    , Low Transverse      SECTION  2012    Procedure: SECTION; Surgeon:ELY KRAMER; Location:[facility] L+D     COLONOSCOPY N/A 2022    Procedure: COLONOSCOPY, WITH POLYPECTOMY ;  Surgeon: Leventhal, Thomas Michael, MD;   "Location: [facility] OR     [facility] EVENT MONITOR (CARDIAC - [place])  10/08    Normal/had palpatiations.      [facility] COLPOSCOPY VULVA W BIOPSY  2002     Current Outpatient Medications   Medication Sig Dispense Refill     BIOTIN PO Take by mouth daily       MAGNESIUM GLYCINATE PO        rivaroxaban ANTICOAGULANT (XARELTO ANTICOAGULANT) 10 MG TABS tablet Take 1 tablet (10 mg) by mouth daily (with dinner) TAKE 1 TABLET BY MOUTH EVERY DAY WITH DINNER 90 tablet 3     STATIN NOT PRESCRIBED (INTENTIONAL) Please choose reason not prescribed from choices below.       VITAMIN D, CHOLECALCIFEROL, PO Take 1,000 Units by mouth daily         Allergies   Allergen Reactions     Benadryl Allergy Other (See Comments)     Had paradoxical reaction - made her very hyper        Social History     Tobacco Use     Smoking status: Never     Smokeless tobacco: Never   Substance Use Topics     Alcohol use: Yes     Alcohol/week: 1.0 standard drink     Comment: very occasional. 1 to 2x a month I may have a glass of wine       History   Drug Use No         Objective     /84 (BP Location: Right arm, Patient Position: Sitting, Cuff Size: Adult Regular)   Pulse 90   Temp 98.1  F (36.7  C) (Tympanic)   Resp 16   Ht 1.77 m (5' 9.69\")   Wt 89.9 kg (198 lb 1.6 oz)   LMP 02/18/2023 (Exact Date)   SpO2 97%   BMI 28.68 kg/m      Physical Exam    GENERAL APPEARANCE: healthy, alert and no distress     EYES: EOMI, PERRL     HENT: ear canals and TM's normal and nose and mouth without ulcers or lesions     NECK: no adenopathy, no asymmetry, masses, or scars and thyroid normal to palpation     RESP: lungs clear to auscultation - no rales, rhonchi or wheezes     CV: regular rates and rhythm, normal S1 S2, no S3 or S4 and no murmur, click or rub     ABDOMEN:  soft, nontender, no HSM or masses and bowel sounds normal     MS: extremities normal- no gross deformities noted, no evidence of inflammation in joints, FROM in all extremities.     SKIN: no suspicious lesions " or rashes     NEURO: Normal strength and tone, sensory exam grossly normal, mentation intact and speech normal     PSYCH: mentation appears normal. and affect normal/bright     LYMPHATICS: No cervical adenopathy    Recent Labs   Lab Test 01/02/23  1037 08/04/22  0818 03/25/22  0841   HGB 14.5  --  15.3     --  257     --  137   POTASSIUM 4.3  --  4.9   CR 0.82  --  0.92   A1C 5.2 5.1  --         Diagnostics:  No labs were ordered during this visit.   No EKG required, no history of coronary heart disease, significant arrhythmia, peripheral arterial disease or other structural heart disease.    Revised Cardiac Risk Index (RCRI):  The patient has the following serious cardiovascular risks for perioperative complications:   - No serious cardiac risks = 0 points     RCRI Interpretation: 0 points: Class I (very low risk - 0.4% complication rate)           Signed Electronically by: SWAPNA Merida CNP  Copy of this evaluation report is provided to requesting physician.

## 2023-03-02 NOTE — PATIENT INSTRUCTIONS
For informational purposes only. Not to replace the advice of your health care provider. Copyright   2003,  [place] [facility]. All rights reserved. Clinically reviewed by Zayra Mccoy MD. [employer] [id number] - REV .  Preparing for Your Surgery  Getting started  A nurse will call you to review your health history and instructions. They will give you an arrival time based on your scheduled surgery time. Please be ready to share:    Your doctor's clinic name and phone number    Your medical, surgical, and anesthesia history    A list of allergies and sensitivities    A list of medicines, including herbal treatments and over-the-counter drugs    Whether the patient has a legal guardian (ask how to send us the papers in advance)  Please tell us if you're pregnant--or if there's any chance you might be pregnant. Some surgeries may injure a fetus (unborn baby), so they require a pregnancy test. Surgeries that are safe for a fetus don't always need a test, and you can choose whether to have one.   If you have a child who's having surgery, please ask for a copy of Preparing for Your Child's Surgery.    Preparing for surgery    Within 10 to 30 days of surgery: Have a pre-op exam (sometimes called an H&P, or History and Physical). This can be done at a clinic or pre-operative center.  ? If you're having a , you may not need this exam. Talk to your care team.    At your pre-op exam, talk to your care team about all medicines you take. If you need to stop any medicines before surgery, ask when to start taking them again.  ? We do this for your safety. Many medicines can make you bleed too much during surgery. Some change how well surgery (anesthesia) drugs work.    Call your insurance company to let them know you're having surgery. (If you don't have insurance, call [phone].)    Call your clinic if there's any change in your health. This includes signs of a cold or flu (sore throat, runny nose,  cough, rash, fever). It also includes a scrape or scratch near the surgery site.    If you have questions on the day of surgery, call your hospital or surgery center.  Eating and drinking guidelines  For your safety: Unless your surgeon tells you otherwise, follow the guidelines below.    Eat and drink as usual until 8 hours before you arrive for surgery. After that, no food or milk.    Drink clear liquids until 2 hours before you arrive. These are liquids you can see through, like water, Gatorade, and Propel Water. They also include plain black coffee and tea (no cream or milk), candy, and breath mints. You can spit out gum when you arrive.    If you drink alcohol: Stop drinking it the night before surgery.    If your care team tells you to take medicine on the morning of surgery, it's okay to take it with a sip of water.  Preventing infection    Shower or bathe the night before and morning of your surgery. Follow the instructions your clinic gave you. (If no instructions, use regular soap.)    Don't shave or clip hair near your surgery site. We'll remove the hair if needed.    Don't smoke or vape the morning of surgery. You may chew nicotine gum up to 2 hours before surgery. A nicotine patch is okay.  ? Note: Some surgeries require you to completely quit smoking and nicotine. Check with your surgeon.    Your care team will make every effort to keep you safe from infection. We will:  ? Clean our hands often with soap and water (or an alcohol-based hand rub).  ? Clean the skin at your surgery site with a special soap that kills germs.  ? Give you a special gown to keep you warm. (Cold raises the risk of infection.)  ? Wear special hair covers, masks, gowns and gloves during surgery.  ? Give antibiotic medicine, if prescribed. Not all surgeries need antibiotics.  What to bring on the day of surgery    Photo ID and insurance card    Copy of your health care directive, if you have one    Glasses and hearing aids (bring  cases)  ? You can't wear contacts during surgery    Inhaler and eye drops, if you use them (tell us about these when you arrive)    CPAP machine or breathing device, if you use them    A few personal items, if spending the night    If you have . . .  ? A pacemaker, ICD (cardiac defibrillator) or other implant: Bring the ID card.  ? An implanted stimulator: Bring the remote control.  ? A legal guardian: Bring a copy of the certified (court-stamped) guardianship papers.  Please remove any jewelry, including body piercings. Leave jewelry and other valuables at home.  If you're going home the day of surgery    You must have a responsible adult drive you home. They should stay with you overnight as well.    If you don't have someone to stay with you, and you aren't safe to go home alone, we may keep you overnight. Insurance often won't pay for this.  After surgery  If it's hard to control your pain or you need more pain medicine, please call your surgeon's office.  Questions?   If you have any questions for your care team, list them here: _________________________________________________________________________________________________________________________________________________________________________ ____________________________________ ____________________________________ ____________________________________

## 2023-03-06 ENCOUNTER — TELEPHONE (OUTPATIENT)
Dept: HEMATOLOGY | Facility: CLINIC | Age: 48
End: 2023-03-06
Payer: COMMERCIAL

## 2023-03-06 NOTE — TELEPHONE ENCOUNTER
I scheduled patient for a new patient 60min visit, but patient called (Voicemail) and was wondering if this is incorrect per previous treatment history. Please advise.

## 2023-03-06 NOTE — TELEPHONE ENCOUNTER
[id number]  Zayra Altamirano  47 year old female  CBCD Diagnosis: VTE  CBCD Provider: Lilo Garcia Chan    Diagnosis:  1. History of estrogen provoked pulmonary embolism and lower extremity DVT back in 2005.  2. Homozygous factor V Leiden Mutation.  3. Chronic anticoagulation therapy with rivaroxaban at 10 mg PO Qday.   4. Family history of breast cancer. Contemplating prophylactic double mastectomy before summer 2023.      Plan from FRANK Gutiérrez note 1/5/23  Zayra remains to be a good candidate to stay on indefinite anticoagulation therapy. She is doing well on rivaroxaban at 10 mg PO Qday and thus I will keep her on this.      As mentioned, Zayra is contemplating to proceed with prophylactic double mastectomy before Summer 2023. I discussed with Zayra today that rivaroxaban management for the surgery is rather simple. She can simply stop her rivaroxaban for 24-48 hours prior to her surgery. Then she can restart her rivaroxaban the day after her surgery as long as she has no bleeding complications.     Note update today:  Patient called can stated that she is planning a surgery biltateral mastectomy (nerve-sparing) at [facility] next Tuesday 3/14/23. She was required to get recommendations from Hematology at [facility]. They message her to hold her Xarelto x 72 hours, then to restart after surgery when safe from bleeding. She was wondering what that meant.     I told her that typically we suggest restarting 12-24 hours after surgery with surgeon approval. She is wondering what to do as this is scheduled as outpatient surgery.  I told her she could call the surgeon to see what there follow up process is. She could also message the hematolgist at [facility] to clarify the plan.    She thanked my for my time and will message the [facility] team for further details.    She also stated that she would like to see a female provider going forward as she nears menopause. I gave her the names of the female doctors/PAs and she plans to schedule her  next annual visit with one of them.  Rosa Maria Wen, MSN, RN, PHN -Nurse Clinician, [facility] [phone]

## 2023-03-08 ENCOUNTER — ANCILLARY PROCEDURE (OUTPATIENT)
Dept: MRI IMAGING | Facility: CLINIC | Age: 48
End: 2023-03-08
Attending: CLINICAL NURSE SPECIALIST
Payer: COMMERCIAL

## 2023-03-08 DIAGNOSIS — Z80.3 FAMILY HISTORY OF MALIGNANT NEOPLASM OF BREAST: ICD-10-CM

## 2023-03-08 DIAGNOSIS — Z12.39 BREAST CANCER SCREENING, HIGH RISK PATIENT: ICD-10-CM

## 2023-03-08 PROCEDURE — A9585 GADOBUTROL INJECTION: HCPCS

## 2023-03-08 PROCEDURE — 77049 MRI BREAST C-+ W/CAD BI: CPT

## 2023-03-08 RX ORDER — GADOBUTROL 604.72 MG/ML
10 INJECTION INTRAVENOUS ONCE
Status: COMPLETED | OUTPATIENT
Start: 2023-03-08 | End: 2023-03-08

## 2023-03-08 RX ADMIN — GADOBUTROL 9 ML: 604.72 INJECTION INTRAVENOUS at 12:53

## 2023-03-08 NOTE — DISCHARGE INSTRUCTIONS
MRI Contrast Discharge Instructions    The IV contrast you received today will pass out of your body in your  urine. This will happen in the next 24 hours. You will not feel this process.  Your urine will not change color.    Drink at least 4 extra glasses of water or juice today (unless your doctor  has restricted your fluids). This reduces the stress on your kidneys.  You may take your regular medicines.    If you are on dialysis: It is best to have dialysis today.    If you have a reaction: Most reactions happen right away. If you have  any new symptoms after leaving the hospital (such as hives or swelling),  call your hospital at the correct number below. Or call your family doctor.  If you have breathing distress or wheezing, call 911.    Special instructions: ***    I have read and understand the above information.    Signature:______________________________________ Date:___________    Staff:__________________________________________ Date:___________     Time:__________    [place] [facility] Departments:    ___[place]: [phone]  ___[facility]: [phone]  ___[place]: [phone] ___[facility]: [phone]  ___[facility]: [phone]  ___[facility]: [phone]  ___[phone]  ___[facility]: [phone]  ___[phone]

## 2023-03-14 ENCOUNTER — APPOINTMENT (OUTPATIENT)
Dept: ULTRASOUND IMAGING | Facility: CLINIC | Age: 48
End: 2023-03-14
Payer: COMMERCIAL

## 2023-03-14 ENCOUNTER — APPOINTMENT (OUTPATIENT)
Dept: CT IMAGING | Facility: CLINIC | Age: 48
End: 2023-03-14
Payer: COMMERCIAL

## 2023-03-14 ENCOUNTER — HOSPITAL ENCOUNTER (EMERGENCY)
Facility: CLINIC | Age: 48
Discharge: HOME | End: 2023-03-14
Attending: EMERGENCY MEDICINE | Admitting: EMERGENCY MEDICINE
Payer: COMMERCIAL

## 2023-03-14 VITALS
WEIGHT: 193 LBS | RESPIRATION RATE: 16 BRPM | BODY MASS INDEX: 27.02 KG/M2 | OXYGEN SATURATION: 95 % | SYSTOLIC BLOOD PRESSURE: 121 MMHG | HEIGHT: 71 IN | DIASTOLIC BLOOD PRESSURE: 67 MMHG | HEART RATE: 83 BPM

## 2023-03-14 DIAGNOSIS — R06.02 SHORTNESS OF BREATH: Primary | ICD-10-CM

## 2023-03-14 DIAGNOSIS — M79.662 PAIN OF LEFT LOWER LEG: ICD-10-CM

## 2023-03-14 LAB
ANION GAP SERPL CALCULATED.3IONS-SCNC: 10 MMOL/L (ref 7–15)
ATRIAL RATE - MUSE: 77 BPM
BASOPHILS # BLD AUTO: 0.1 10E3/UL (ref 0–0.2)
BASOPHILS NFR BLD AUTO: 1 %
BUN SERPL-MCNC: 14.9 MG/DL (ref 6–20)
CALCIUM SERPL-MCNC: 8.7 MG/DL (ref 8.6–10)
CHLORIDE SERPL-SCNC: 104 MMOL/L (ref 98–107)
CREAT SERPL-MCNC: 0.83 MG/DL (ref 0.51–0.95)
DEPRECATED HCO3 PLAS-SCNC: 25 MMOL/L (ref 22–29)
DIASTOLIC BLOOD PRESSURE - MUSE: NORMAL MMHG
EOSINOPHIL # BLD AUTO: 0 10E3/UL (ref 0–0.7)
EOSINOPHIL NFR BLD AUTO: 0 %
ERYTHROCYTE [DISTWIDTH] IN BLOOD BY AUTOMATED COUNT: 12.6 % (ref 10–15)
GFR SERPL CREATININE-BSD FRML MDRD: 86 ML/MIN/1.73M2
GLUCOSE SERPL-MCNC: 105 MG/DL (ref 70–99)
HCT VFR BLD AUTO: 44.8 % (ref 35–47)
HGB BLD-MCNC: 14.8 G/DL (ref 11.7–15.7)
IMM GRANULOCYTES # BLD: 0 10E3/UL
IMM GRANULOCYTES NFR BLD: 0 %
INTERPRETATION ECG - MUSE: NORMAL
LYMPHOCYTES # BLD AUTO: 1.1 10E3/UL (ref 0.8–5.3)
LYMPHOCYTES NFR BLD AUTO: 11 %
MCH RBC QN AUTO: 30.1 PG (ref 26.5–33)
MCHC RBC AUTO-ENTMCNC: 33 G/DL (ref 31.5–36.5)
MCV RBC AUTO: 91 FL (ref 78–100)
MONOCYTES # BLD AUTO: 0.3 10E3/UL (ref 0–1.3)
MONOCYTES NFR BLD AUTO: 3 %
NEUTROPHILS # BLD AUTO: 8.8 10E3/UL (ref 1.6–8.3)
NEUTROPHILS NFR BLD AUTO: 85 %
NRBC # BLD AUTO: 0 10E3/UL
NRBC BLD AUTO-RTO: 0 /100
P AXIS - MUSE: 53 DEGREES
PLATELET # BLD AUTO: 260 10E3/UL (ref 150–450)
POTASSIUM SERPL-SCNC: 4.1 MMOL/L (ref 3.4–5.3)
PR INTERVAL - MUSE: 156 MS
QRS DURATION - MUSE: 72 MS
QT - MUSE: 368 MS
QTC - MUSE: 416 MS
R AXIS - MUSE: 67 DEGREES
RBC # BLD AUTO: 4.92 10E6/UL (ref 3.8–5.2)
SODIUM SERPL-SCNC: 139 MMOL/L (ref 136–145)
SYSTOLIC BLOOD PRESSURE - MUSE: NORMAL MMHG
T AXIS - MUSE: 48 DEGREES
TROPONIN T SERPL HS-MCNC: <6 NG/L
VENTRICULAR RATE- MUSE: 77 BPM
WBC # BLD AUTO: 10.4 10E3/UL (ref 4–11)

## 2023-03-14 PROCEDURE — 99284 EMERGENCY DEPT VISIT MOD MDM: CPT | Mod: 25 | Performed by: EMERGENCY MEDICINE

## 2023-03-14 PROCEDURE — 93010 ELECTROCARDIOGRAM REPORT: CPT | Performed by: EMERGENCY MEDICINE

## 2023-03-14 PROCEDURE — 36415 COLL VENOUS BLD VENIPUNCTURE: CPT

## 2023-03-14 PROCEDURE — 82310 ASSAY OF CALCIUM: CPT

## 2023-03-14 PROCEDURE — 96361 HYDRATE IV INFUSION ADD-ON: CPT | Performed by: EMERGENCY MEDICINE

## 2023-03-14 PROCEDURE — 84484 ASSAY OF TROPONIN QUANT: CPT

## 2023-03-14 PROCEDURE — 93971 EXTREMITY STUDY: CPT | Mod: LT

## 2023-03-14 PROCEDURE — 99285 EMERGENCY DEPT VISIT HI MDM: CPT | Mod: 25 | Performed by: EMERGENCY MEDICINE

## 2023-03-14 PROCEDURE — 258N000003 HC RX IP 258 OP 636: Performed by: EMERGENCY MEDICINE

## 2023-03-14 PROCEDURE — 71275 CT ANGIOGRAPHY CHEST: CPT | Mod: 26 | Performed by: RADIOLOGY

## 2023-03-14 PROCEDURE — 93971 EXTREMITY STUDY: CPT | Mod: 26 | Performed by: RADIOLOGY

## 2023-03-14 PROCEDURE — 85025 COMPLETE CBC W/AUTO DIFF WBC: CPT

## 2023-03-14 PROCEDURE — 250N000011 HC RX IP 250 OP 636: Performed by: STUDENT IN AN ORGANIZED HEALTH CARE EDUCATION/TRAINING PROGRAM

## 2023-03-14 PROCEDURE — 96360 HYDRATION IV INFUSION INIT: CPT | Mod: 59 | Performed by: EMERGENCY MEDICINE

## 2023-03-14 PROCEDURE — 93005 ELECTROCARDIOGRAM TRACING: CPT | Performed by: EMERGENCY MEDICINE

## 2023-03-14 PROCEDURE — 71275 CT ANGIOGRAPHY CHEST: CPT

## 2023-03-14 RX ORDER — SODIUM CHLORIDE 9 MG/ML
INJECTION, SOLUTION INTRAVENOUS CONTINUOUS
Status: DISCONTINUED | OUTPATIENT
Start: 2023-03-14 | End: 2023-03-14 | Stop reason: HOSPADM

## 2023-03-14 RX ORDER — IOPAMIDOL 755 MG/ML
70 INJECTION, SOLUTION INTRAVASCULAR ONCE
Status: COMPLETED | OUTPATIENT
Start: 2023-03-14 | End: 2023-03-14

## 2023-03-14 RX ADMIN — IOPAMIDOL 70 ML: 755 INJECTION, SOLUTION INTRAVENOUS at 18:31

## 2023-03-14 RX ADMIN — SODIUM CHLORIDE: 9 INJECTION, SOLUTION INTRAVENOUS at 20:50

## 2023-03-14 RX ADMIN — SODIUM CHLORIDE 1000 ML: 9 INJECTION, SOLUTION INTRAVENOUS at 19:26

## 2023-03-14 ASSESSMENT — ACTIVITIES OF DAILY LIVING (ADL)
ADLS_ACUITY_SCORE: 37

## 2023-03-14 NOTE — ED TRIAGE NOTES
carol from [facility]  Was walking, bent over, and started to feel off right after  +sob  Had to sit down immediately  Hx of PE in 2005, has been on xarelto  Stopped xarelto 3/10 in preparation for double mastectomy  Decided to not have surgery last night, so restarted xarelto  100% RA         Triage Assessment     Row Name 03/14/23 1502       Triage Assessment (Adult)    Airway WDL WDL       Respiratory WDL    Respiratory WDL X;all    Rhythm/Pattern, Respiratory shortness of breath       Skin Circulation/Temperature WDL    Skin Circulation/Temperature WDL WDL       Cardiac WDL    Cardiac WDL WDL       Peripheral/Neurovascular WDL    Peripheral Neurovascular WDL WDL       Cognitive/Neuro/Behavioral WDL    Cognitive/Neuro/Behavioral WDL WDL

## 2023-03-14 NOTE — ED PROVIDER NOTES
ED Provider Note  [facility]      History     Chief Complaint   Patient presents with     Shortness of Breath     Altered Mental Status     HPI  Zayra Altamirano is a 47 year old female with a PMHx of homozygous factor V Leiden and provoked VTE on chronic Xarelto who was BIBA for evaluation of sudden onset SOB and left leg pain. Since 3/10 pt was holding her Xarelto at the request of general surgery in preparation for a prophylactic bilateral mastectomy that was scheduled for 3/14 at [facility]. Yesterday the pt met with the plastic surgery team there and was presented with new information that made her decide against the surgery. She took a dose of her normal 10mg Xarelto last night. She was feeling in her normal state of health until this afternoon while she was walking around [facility] with a friend. They were 45 minutes into their walk when she suddenly developed left posterior leg pain above the knee. She soon after felt very dyspneic and stopped to sit down. She sat for ~5 minutes unable to catch her breath which is when they called EMS. Upon EMS arrival she was still symptomatic, she was given supplemental oxygen (unclear amount), BGL was 124, .    Upon ED arrival the pt was sating 100% on RA not requiring supplemental oxygen. She complains of mild epigastric tenderness and mild left leg pain that is constant and unchanged by movement. She denies ongoing SOB, cough, CP, palpitations, abdominal pain, n/v/d, back pain, urinary symptoms.     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  change goal INR 1.3-2.0     Rosacea      Past Surgical History:   Procedure Laterality Date     C/SECTION, LOW TRANSVERSE  2008    , Low Transverse      SECTION  2012    Procedure: SECTION; Surgeon:ELY KRAMER; Location:[facility] L+D     COLONOSCOPY N/A 2022    Procedure: COLONOSCOPY, WITH POLYPECTOMY ;  Surgeon: Leventhal, Thomas Michael, MD;  Location: [facility] OR     [facility] EVENT MONITOR (CARDIAC - [place])  10/08    Normal/had palpatiations.      [facility] COLPOSCOPY VULVA W BIOPSY       BIOTIN PO  MAGNESIUM GLYCINATE PO  rivaroxaban ANTICOAGULANT (XARELTO ANTICOAGULANT) 10 MG TABS tablet  STATIN NOT PRESCRIBED (INTENTIONAL)  VITAMIN D, CHOLECALCIFEROL, PO      Allergies   Allergen Reactions     Benadryl Allergy Other (See Comments)     Had paradoxical reaction - made her very hyper     Family History  Family History   Problem Relation Age of Onset     Breast Cancer Mother 63        IDC , recurrent ; ER+     Hypertension Mother      Cerebrovascular Disease Mother         Ischemic stroke 3/18 at age 68.     Lymphoma Mother 70        follicular     Lipids Father      Hypertension Father      Circulatory Father         veins stripped      Unknown/Adopted Father         hyperlipidemia      Cataracts Father      Other - See Comments Father         bladder lesions     Blood Disease Sister         FACTOR 5 LEIDEN      Blood Disease Sister         FACTOR 5     Blood Disease Brother         FACTOR 5 LEIDEN HOMOZYGOUS     Breast Cancer Maternal Grandmother 58        contralateral at 74     Diabetes Maternal Grandmother         Type 2     Cerebrovascular Disease Maternal Grandmother      Cardiovascular Maternal Grandmother   "       MI     Hypertension Maternal Grandmother      Cancer Maternal Grandfather 68        esophageal or stomach?     Substance Abuse Paternal Grandfather      Breast Cancer Maternal Aunt 71     Breast Cancer Maternal Aunt 60     Lymphoma Maternal Aunt 70     Cancer Maternal Aunt 71        vulvar     Pulmonary Embolism Maternal Aunt         bilateral     Cancer Paternal Aunt          at 58; unknown type     Throat cancer Paternal Uncle 55     Brain Cancer Paternal Uncle 61         at 64     Lupus Paternal Uncle      Social History   Social History     Tobacco Use     Smoking status: Never     Smokeless tobacco: Never   Vaping Use     Vaping Use: Never used   Substance Use Topics     Alcohol use: Yes     Alcohol/week: 1.0 standard drink     Comment: very occasional. 1 to 2x a month I may have a glass of wine     Drug use: No      Past medical history, past surgical history, medications, allergies, family history, and social history were reviewed with the patient. No additional pertinent items.      A medically appropriate review of systems was performed with pertinent positives and negatives noted in the HPI, and all other systems negative.    Physical Exam   Height: 179 cm (5' 10.47\")  Weight: 87.5 kg (193 lb)  Physical Exam  Constitutional:       General: She is not in acute distress.     Appearance: She is well-developed.   HENT:      Head: Normocephalic and atraumatic.   Eyes:      Extraocular Movements: Extraocular movements intact.      Pupils: Pupils are equal, round, and reactive to light.   Cardiovascular:      Rate and Rhythm: Normal rate and regular rhythm.      Heart sounds: No murmur heard.  Pulmonary:      Effort: Pulmonary effort is normal. No respiratory distress.      Breath sounds: Normal breath sounds.   Abdominal:      General: Bowel sounds are normal.      Palpations: Abdomen is soft.      Comments: Mild epigastric tenderness to deep palpation   Musculoskeletal:         General: Normal " range of motion.      Cervical back: Normal range of motion and neck supple.      Right lower leg: No edema.      Left lower leg: No edema.      Comments: Mild tenderness to palpation above the left knee posteriorly    Skin:     General: Skin is warm and dry.      Capillary Refill: Capillary refill takes less than 2 seconds.   Neurological:      General: No focal deficit present.      Mental Status: She is alert and oriented to person, place, and time.      Cranial Nerves: No cranial nerve deficit.      Motor: No weakness.   Psychiatric:         Mood and Affect: Mood normal.         Behavior: Behavior normal.         ED Course, Procedures, & Data      Procedures       ED Course Selections:        EKG Interpretation:      Interpreted by Thais Woodward MD  Time reviewed: 1541  Symptoms at time of EKG: none   Rhythm: normal sinus   Rate: normal  Axis: normal  Ectopy: none  Conduction: normal  ST Segments/ T Waves: No ST-T wave changes  Q Waves: none  Comparison to prior: Unchanged    Clinical Impression: normal EKG                     Results for orders placed or performed during the hospital encounter of 03/14/23   EKG 12 lead     Status: None (Preliminary result)   Result Value Ref Range    Systolic Blood Pressure  mmHg    Diastolic Blood Pressure  mmHg    Ventricular Rate 77 BPM    Atrial Rate 77 BPM    NV Interval 156 ms    QRS Duration 72 ms     ms    QTc 416 ms    P Axis 53 degrees    R AXIS 67 degrees    T Axis 48 degrees    Interpretation ECG Sinus rhythm  Normal ECG        Medications - No data to display  Labs Ordered and Resulted from Time of ED Arrival to Time of ED Departure - No data to display  CT Chest Pulmonary Embolism w Contrast    (Results Pending)   US Lower Extremity Venous Duplex Left    (Results Pending)          Critical care was not performed.     Medical Decision Making  The patient's presentation was of high complexity (an acute health issue posing potential threat to life or  bodily function).    The patient's evaluation involved:  ordering and/or review of 3+ test(s) in this encounter (see separate area of note for details)    The patient's management necessitated further care after sign-out to Dr. Sun (see their note for further management).      Assessment & Plan    Zayra Altamirano is a 47 year old female with a PMHx of homozygous factor V Leiden and provoked VTE on chronic Xarelto who was BIBA for evaluation of sudden onset SOB and left leg pain. She is hemodynamically stable, no tachycardia, not requiring supplemental oxygen. Her story of being off Xarelto for several days does make me concerned for new VTE given her underlying genetic clotting disorder. Will plan on CBC, BMP, EKG, troponin, LLE Venous Doppler US as well as CT PE. Doubt infectious process or underlying lung disease given history of otherwise being well and no prior lung disease history. Question whether there may be an anxiety component to today's events as she has been under a great deal of stress with her decision to cancel her elective mastectomy procedure.     --    ED Attending Physician Attestation    I Thais Woodward MD, cared for this patient with the Resident. I have performed a history and physical examination of the patient and discussed management with the resident. I reviewed the resident's documentation above and agree with the documented findings and plan of care.    Summary of HPI, PE, ED Course   Patient is a 47 year old female evaluated in the emergency department for shortness of breath and lightheadedness. Exam and ED course notable for no respiratory distress, clear lungs bilaterally, no swelling in the lower extremities and intact peripheral pulses.  ECG shows no acute ischemic changes.  Troponin is less than 6.  Hemoglobin is normal at 14.8.  Duplex ultrasound of the left leg is negative for DVT.  She did go for CT PE study however the preliminary read notes that the contrast bolus was  not appropriately timed and nondiagnostic for PE.  I did discuss this with her and discussed the potential of adding on a D-dimer and if negative with negative lower extremity ultrasound to reinitiate her home anticoagulation versus repeating her CT.  We did discuss risks and benefits of CT including repeat radiation and contrast bolus.  She does feel strongly that she would like to repeat the CT if nondiagnostic.  Will plan to give IV fluids and await final read.  Patient was signed out to evening provider pending final radiology read with plan to repeat CT if nondiagnostic.        Thais Woodward MD  Emergency Medicine       I have reviewed the nursing notes. I have reviewed the findings, diagnosis, plan and need for follow up with the patient.    New Prescriptions    No medications on file         Sabrina Maharaj MD  [facility] EMERGENCY DEPARTMENT  3/14/2023     Thais Woodward MD  03/14/23 1953

## 2023-03-15 ENCOUNTER — TELEPHONE (OUTPATIENT)
Dept: FAMILY MEDICINE | Facility: CLINIC | Age: 48
End: 2023-03-15
Payer: COMMERCIAL

## 2023-03-15 NOTE — ED NOTES
The patient's care was signed out to me from Dr. Mosquera.    Plan was for follow-up on final CT chest read and if contrast bolus is inadequate repeat CT chest per patient's wishes.  CT chest unfortunately did show inadequate bolusing of the contrast and cannot rule out any peripheral pulmonary emboli.  In discussion with the patient, she would like to go ahead and repeat the CT chest to ensure that there is no peripheral pulmonary emboli.    CT chest was repeated with thankfully adequate contrast bolusing.  CT on repeat thankfully did not show any evidence for acute PE.  Also reviewed patient's ED course and ultrasound of the left lower extremity without evidence for DVT.    On re-evaluation, the patient is resting comfortably after the above interventions. I discussed with the patient the results of the above studies. All questions were answered prior to discharge, and the patient was told to follow up with her PCP per discharge instructions. Reasons for return as well as follow up were reviewed with the patient.  The patient expressed understanding and agreement.    Disposition: The patient was discharged to home in stable condition.       Ella Sun MD  03/14/23 4811

## 2023-03-15 NOTE — TELEPHONE ENCOUNTER
Reason for Call:  Appointment Request    Patient requesting this type of appt:  Hospital/ED Follow-Up     Requested provider: Allie Cotter    Reason patient unable to be scheduled: Not within requested timeframe    When does patient want to be seen/preferred time: 1-2 days    Comments: Patient was seen in the ER and was told to F/U within 2 days    [facility] or would you prefer to receive a phone call?:   Patient would prefer a phone call   Okay to leave a detailed message?: Yes at Cell number on file:    Telephone Information:   Mobile [phone]       Call taken on 3/15/2023 at 1:55 PM by Romina Callejas

## 2023-03-15 NOTE — DISCHARGE INSTRUCTIONS
CT of your chest showed: Contrast bolus is adequate and exam is thankfully negative for acute pulmonary embolism.     Ultrasound of your leg thankfully also did not show any blood clot.

## 2023-03-17 ENCOUNTER — OFFICE VISIT (OUTPATIENT)
Dept: FAMILY MEDICINE | Facility: CLINIC | Age: 48
End: 2023-03-17
Payer: COMMERCIAL

## 2023-03-17 VITALS
TEMPERATURE: 97.2 F | RESPIRATION RATE: 16 BRPM | DIASTOLIC BLOOD PRESSURE: 80 MMHG | WEIGHT: 194.9 LBS | SYSTOLIC BLOOD PRESSURE: 126 MMHG | BODY MASS INDEX: 27.29 KG/M2 | HEART RATE: 68 BPM | OXYGEN SATURATION: 96 % | HEIGHT: 71 IN

## 2023-03-17 DIAGNOSIS — F41.9 ANXIETY: Primary | ICD-10-CM

## 2023-03-17 PROCEDURE — 99214 OFFICE O/P EST MOD 30 MIN: CPT | Performed by: NURSE PRACTITIONER

## 2023-03-17 RX ORDER — PROPRANOLOL HYDROCHLORIDE 10 MG/1
10-20 TABLET ORAL 3 TIMES DAILY PRN
Qty: 60 TABLET | Refills: 3 | Status: SHIPPED | OUTPATIENT
Start: 2023-03-17 | End: 2023-08-04

## 2023-03-17 ASSESSMENT — PAIN SCALES - GENERAL: PAINLEVEL: NO PAIN (0)

## 2023-03-17 NOTE — PROGRESS NOTES
"  Assessment & Plan     Anxiety  Following up today from the emergency department.  Suspect many of her symptoms are related to situational stressors and anxiety.  She endorses some possible panic attack as well.  At this time I do not think we need to start a daily medication as much at her symptoms are surrounding surgery that she decided not to proceed with.  We can start propanolol to help with anxiousness and her symptoms of racing heart.  Certainly if symptoms worsen or persist we can consider starting SSRI.  - propranolol (INDERAL) 10 MG tablet; Take 1-2 tablets (10-20 mg) by mouth 3 times daily as needed (Anxiety)      30 minutes spent on the date of the encounter doing chart review, history and exam, documentation and further activities per the note     MED REC REQUIRED  Post Medication Reconciliation Status: discharge medications reconciled and changed, per note/orders  BMI:   Estimated body mass index is 27.28 kg/m  as calculated from the following:    Height as of this encounter: 1.8 m (5' 10.87\").    Weight as of this encounter: 88.4 kg (194 lb 14.4 oz).           No follow-ups on file.    SWAPNA Merida [facility]    Coral Iverson is a 47 year old, presenting for the following health issues:  Hospital F/U      HPI       ED Follow Up:  Seen in the ED on 3/14 for sudden shortness of breath and leg pain.  Was told to follow up with PCP in 1-2 days.  Was off her xarelto for 72 hours in preparation for surgery.     Had surgical consult with her plastic surgeon last week.  Was given a bunch of new information, and was told there was not option for nerve sparing if nipples removed. Keeping her nipples would have been with big risk.  Complication for Goldilock's procedure is 20%.  Surgery was supposed to be on Tuesday.  Did not go through with it.  Her cancer risk is about 23%.      Went back on xarelto on Monday.  Drove back from [place].  Was walking with a " "friend the next day, they walked for several miles.  Initially feeling fine, leaned over to tie her boot, had a cramp in inner left thigh.  Kept walking and felt super light headed.  Had to sit down immediately, could breath, just felt very light headed.  Maybe slightly short of breath, but was different than PE.  Called 911.  When fire department came, sats were 88% and HR was 160's.      Wondering if it was a panic attack.        Review of Systems   Constitutional, HEENT, cardiovascular, pulmonary, gi and gu systems are negative, except as otherwise noted.      Objective    /80 (BP Location: Right arm, Patient Position: Sitting, Cuff Size: Adult Regular)   Pulse 68   Temp 97.2  F (36.2  C) (Tympanic)   Resp 16   Ht 1.8 m (5' 10.87\")   Wt 88.4 kg (194 lb 14.4 oz)   LMP 02/18/2023 (Exact Date)   SpO2 96%   BMI 27.28 kg/m    Body mass index is 27.28 kg/m .  Physical Exam                       "

## 2023-03-27 ENCOUNTER — OFFICE VISIT (OUTPATIENT)
Dept: OTHER | Facility: CLINIC | Age: 48
End: 2023-03-27
Attending: INTERNAL MEDICINE
Payer: COMMERCIAL

## 2023-03-27 VITALS
DIASTOLIC BLOOD PRESSURE: 75 MMHG | BODY MASS INDEX: 28.29 KG/M2 | WEIGHT: 197.6 LBS | OXYGEN SATURATION: 96 % | SYSTOLIC BLOOD PRESSURE: 110 MMHG | HEIGHT: 70 IN | HEART RATE: 96 BPM

## 2023-03-27 DIAGNOSIS — Z86.718 H/O DEEP VENOUS THROMBOSIS: Primary | ICD-10-CM

## 2023-03-27 PROCEDURE — 99205 OFFICE O/P NEW HI 60 MIN: CPT | Performed by: INTERNAL MEDICINE

## 2023-03-27 PROCEDURE — G0463 HOSPITAL OUTPT CLINIC VISIT: HCPCS

## 2023-03-27 NOTE — PROGRESS NOTES
INITIAL VASCULAR MEDICAL ASSESSMENT  REFERRAL SOURCE: self  REASON FOR CONSULT: Leiden homozygote in a patient chronically ACd with Xarelto since 2019 after a fourteen year h/o warfarin AC after first and only lifetime VTE of RLE DVT and PE provoked in association w/ NuvaRing contraception utilization currently planning to undergo prophylactic bilateral mastectomy due to a FHx of multiple BRCA negative female family members who have had breast cancer.    She recently while doing hot yoga c/o sxs of venous engorgement in her legs. These sxs resolved. She is seeking guidance regarding perioperative AC interruption.         Review Of Systems  Skin: negative  Eyes: negative  Ears/Nose/Throat: negative  Respiratory: No shortness of breath, dyspnea on exertion, cough, or hemoptysis  Cardiovascular: negative  Gastrointestinal: negative  Genitourinary: negative  Musculoskeletal: as above  Neurologic: negative  Psychiatric: negative  Hematologic/Lymphatic/Immunologic: negative  Endocrine: negative      PAST MEDICAL HISTORY:                  Past Medical History:   Diagnosis Date     Congenital deficiency of other clotting factors     Factor 5 Leiden homozygous mutation     DVT of leg (deep venous thrombosis) (H) 2005     Embolism and thrombosis of unspecified site     left leg that traveled to become PE     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  change goal INR 1.3-2.0     Rosacea        PAST SURGICAL HISTORY:                  Past Surgical History:   Procedure Laterality Date     C/SECTION, LOW TRANSVERSE  2008    , Low Transverse       SECTION  2012    Procedure: SECTION; Surgeon:ELY KRAMER; Location:[facility] L+D     COLONOSCOPY N/A 2022    Procedure: COLONOSCOPY, WITH POLYPECTOMY ;  Surgeon: Leventhal, Thomas Michael, MD;  [place] [facility] [place]     [facility] EVENT MONITOR (CARDIAC - [place])  10/08    Normal/had palpatiations.      [facility] COLPOSCOPY VULVA W BIOPSY         CURRENT MEDICATIONS:                  Current Outpatient Medications   Medication Sig Dispense Refill     BIOTIN PO Take by mouth daily       MAGNESIUM GLYCINATE PO        propranolol (INDERAL) 10 MG tablet Take 1-2 tablets (10-20 mg) by mouth 3 times daily as needed (Anxiety) 60 tablet 3     rivaroxaban ANTICOAGULANT (XARELTO ANTICOAGULANT) 10 MG TABS tablet Take 1 tablet (10 mg) by mouth daily (with dinner) TAKE 1 TABLET BY MOUTH EVERY DAY WITH DINNER 90 tablet 3     STATIN NOT PRESCRIBED (INTENTIONAL) Please choose reason not prescribed from choices below.       VITAMIN D, CHOLECALCIFEROL, PO Take 1,000 Units by mouth daily         ALLERGIES:                  Allergies   Allergen Reactions     Benadryl Allergy Other (See Comments)     Had paradoxical reaction - made her very hyper       SOCIAL HISTORY:                  Social History     Socioeconomic History     Marital status:      Spouse name: Michael     Number of children: 2     Years of education: Not on file     Highest education level: Not on file   Occupational History     Occupation:       Occupation:     Tobacco Use     Smoking status: Never     Smokeless tobacco: Never   Vaping Use     Vaping Use: Never used   Substance and Sexual Activity     Alcohol use: Yes     Alcohol/week: 1.0 standard drink     Types: 1 Standard drinks or equivalent per week     Comment: rare occ     Drug use: No     Sexual activity: Yes     Partners: Male     Birth control/protection: Male Surgical     Comment:  had vasectomy   Other Topics Concern     Parent/sibling w/ CABG, MI or angioplasty before 65F  55M? No   Social History Narrative    Dairy/d 1-2 servings/d. 3 calcium/mag pills    Caffeine 0-1 servings/d    Exercise 3 week  walks     Sunscreen used - Yes    Seatbelts used - Yes    Working smoke/CO detectors in the home - Yes    Guns stored in the home - No    Self Breast Exams - Yes breastfeeding    Self Testicular Exam - NA    Eye Exam up to date YES    Dental Exam up to date - Yes    Pap Smear up to date - Yes    Mammogram up to date - NA    PSA up to date - NA    Dexa Scan up to date - NA    Flex Sig / Colonoscopy up to date - NA    Immunizations up to date - Yes tetanus vac 2004    Abuse: Current or Past(Physical, Sexual or Emotional)- No    Do you feel safe in your environment - Yes    periods Q 31, last 3-4 days    2/7/06  3/31/09    Emily Hernandez RN     Social Determinants of Health     Financial Resource Strain: Not on file   Food Insecurity: Not on file   Transportation Needs: Not on file   Physical Activity: Not on file   Stress: Not on file   Social Connections: Not on file   Intimate Partner Violence: Not At Risk     Fear of Current or Ex-Partner: No     Emotionally Abused: No     Physically Abused: No     Sexually Abused: No   Housing Stability: Not on file       FAMILY HISTORY:                   Family History   Problem Relation Age of Onset     Breast Cancer Mother 63        IDC 2012, recurrent 2018; ER+     Hypertension Mother      Cerebrovascular Disease Mother         Ischemic stroke 3/18 at age 68.     Lymphoma Mother 70        follicular     Lipids Father      Hypertension Father      Circulatory Father         veins stripped      Unknown/Adopted Father         hyperlipidemia      Cataracts Father      Other - See Comments Father         bladder lesions     Blood Disease Sister         FACTOR 5 LEIDEN      Blood Disease Sister         FACTOR 5     Blood Disease Brother         FACTOR 5 LEIDEN HOMOZYGOUS     Breast Cancer Maternal Grandmother 58        contralateral at 74     Diabetes  Maternal Grandmother         Type 2     Cerebrovascular Disease Maternal Grandmother      Cardiovascular Maternal Grandmother         MI     Hypertension Maternal Grandmother      Cancer Maternal Grandfather 68        esophageal or stomach?     Substance Abuse Paternal Grandfather      Breast Cancer Maternal Aunt 71     Breast Cancer Maternal Aunt 60     Lymphoma Maternal Aunt 70     Cancer Maternal Aunt 71        vulvar     Pulmonary Embolism Maternal Aunt         bilateral     Cancer Paternal Aunt          at 58; unknown type     Throat cancer Paternal Uncle 55     Brain Cancer Paternal Uncle 61         at 64     Lupus Paternal Uncle          Physical exam Reveals:    O/P: WNL  HEENT: WNL  NECK: No JVD, thyromegaly, or lymphadenopathy  HEART: RRR, no murmurs, gallops, or rubs  LUNGS: CTA bilaterally without rales, wheezes, or rhonchi  GI: NABS, nondistended, nontender, soft  EXT:without cyanosis, clubbing, or edema; CEAP C3 venous insufficiency bilaterally.   NEURO: nonfocal  : no flank tenderness        Component      Latest Ref Rng & Units 2005 12/10/2006 2007 2007   D-Dimer      0.0 - 0.50 ug/ml FEU  0.2  0.2   Lupus Result         (Note)    Factor 9 Assay      60 - 140 %   74    Factor 8 Assay      60 - 140 %   118    Factor 11 Assay      60 - 140 %   81    Homocysteine umol/L      4.0 - 12.0 umol/L 5.9      Protein S Free      65 - 125 % 85      Prot C Chromogenic      70 - 130 % 98      Antithrombin III Chromogenic      80 - 120 % 88        Component      Latest Ref Rng & Units 10/2/2007 2015   D-Dimer      0.0 - 0.50 ug/ml FEU 0.3 0.2   Lupus Result           Factor 9 Assay      60 - 140 %     Factor 8 Assay      60 - 140 %     Factor 11 Assay      60 - 140 %     Homocysteine umol/L      4.0 - 12.0 umol/L     Protein S Free      65 - 125 %     Prot C Chromogenic      70 - 130 %     Antithrombin III Chromogenic      80 - 120 %                 BILATERAL LOWER EXTREMITY VENOUS  DOPPLER, 1/2/06        HISTORY: Rapid heart rate.  Suspicion of DVT.  Patient has a history   of a clotting disorder.        FINDINGS: There is no evidence of DVT.  All deep venous structures   from common femoral vein to proximal calf vein are compressible with   normal phasic flow and augmentation.        IMPRESSION: No evidence of DVT bilaterally.  Results were faxed to   the Emergency Department by Dr Carrion at the completion of the   study.  Previously seen calf vein thrombosis on the study from   12/13/05 has resolved.  Patient is on Coumadin.      A/P:    (Z86.718) H/O single lifetime VTE of RLE DVT w/ PE provoked in association with Nuva Ring use, also with Leiden homozygosity, on indefinite AC with xarelto 10 mg dialy.  (primary encounter diagnosis)  Comment: She should remain on Xarelto until the time preceding surgery. She should hod it ideally form a VTE perspective for 24 to 48 hours preoperatively. If periprocedural concerns specific to the surgery would place her at higher postop bleeding risk and wound healing risk, it would not be unreasonable to hold Xarelto for 72 hours preoperatively. She should be reACd as soon as hemostasis is felt to be achieved postoperatively.   Plan: US Venous Competency Bilateral in next 1 to 2 weeks to assess c/o venous engorgement in yoga, RCT one week later, discuss whether the anatomical findings at that study place her a t a higher postop VTE recurrence risk. Discuss compression hosiery based upon those anatomical findings.             63 minutes [facility] on today's date.

## 2023-03-27 NOTE — PROGRESS NOTES
"Patient is here to discuss follow     /75 (BP Location: Left arm, Patient Position: Chair, Cuff Size: Adult Regular)   Pulse 96   Ht 5' 10\" (1.778 m)   Wt 197 lb 9.6 oz (89.6 kg)   LMP 02/18/2023 (Exact Date)   SpO2 96%   BMI 28.35 kg/m      Questions patient would like addressed today are: vain pain in inner thighs. family history of similar symptoms.     Refills are needed: No    Has homecare services and agency name:  [facility]    "

## 2023-04-11 ENCOUNTER — ANCILLARY PROCEDURE (OUTPATIENT)
Dept: ULTRASOUND IMAGING | Facility: CLINIC | Age: 48
End: 2023-04-11
Attending: INTERNAL MEDICINE
Payer: COMMERCIAL

## 2023-04-11 DIAGNOSIS — Z86.718 H/O DEEP VENOUS THROMBOSIS: ICD-10-CM

## 2023-04-11 PROCEDURE — 93970 EXTREMITY STUDY: CPT | Performed by: SURGERY

## 2023-04-18 ENCOUNTER — OFFICE VISIT (OUTPATIENT)
Dept: OTHER | Facility: CLINIC | Age: 48
End: 2023-04-18
Attending: INTERNAL MEDICINE
Payer: COMMERCIAL

## 2023-04-18 VITALS
WEIGHT: 193.4 LBS | HEIGHT: 70 IN | SYSTOLIC BLOOD PRESSURE: 114 MMHG | DIASTOLIC BLOOD PRESSURE: 73 MMHG | OXYGEN SATURATION: 98 % | BODY MASS INDEX: 27.69 KG/M2 | HEART RATE: 74 BPM

## 2023-04-18 DIAGNOSIS — I87.2 VENOUS (PERIPHERAL) INSUFFICIENCY: Primary | ICD-10-CM

## 2023-04-18 PROCEDURE — G0463 HOSPITAL OUTPT CLINIC VISIT: HCPCS

## 2023-04-18 PROCEDURE — 99215 OFFICE O/P EST HI 40 MIN: CPT | Performed by: INTERNAL MEDICINE

## 2023-04-18 PROCEDURE — 99212 OFFICE O/P EST SF 10 MIN: CPT | Performed by: INTERNAL MEDICINE

## 2023-04-18 NOTE — PROGRESS NOTES
"Patient is here to discuss follow up    /73 (BP Location: Right arm, Patient Position: Chair, Cuff Size: Adult Regular)   Pulse 74   Ht 5' 10\" (1.778 m)   Wt 193 lb 6.4 oz (87.7 kg)   LMP 02/18/2023 (Exact Date)   SpO2 98%   BMI 27.75 kg/m      Questions patient would like addressed today are: N/A.    Refills are needed: No    Has homecare services and agency name:  [facility]"

## 2023-04-18 NOTE — PROGRESS NOTES
HPI: Zayra Altamirano is a 47 year old Leiden homozygote who is chronically ACd with Xarelto since 2019 after a fourteen year h/o warfarin AC after first and only lifetime VTE of RLE DVT and PE provoked in association w/ NuvaRing contraception utilization currently planning to undergo prophylactic bilateral mastectomy due to a FHx of multiple BRCA negative female family members who have had breast cancer.     She recently while doing hot yoga c/o sxs of venous engorgement in her legs. These sxs resolved. She is seeking guidance regarding perioperative AC interruption.     When seen on 3/27/23, I stated she should remain on Xarelto until the time preceding surgery. I stated she should hold it ideally form a VTE perspective for 24 to 48 hours preoperatively. If periprocedural concerns specific to the surgery would place her at higher postop bleeding risk and wound healing risk, it would not be unreasonable to hold Xarelto for 72 hours preoperatively. I stated she should be reACd as soon as hemostasis is felt to be achieved postoperatively.     We obtained a US Venous Competency Bilateral on 4/11/2023 revealing no deep or superficial vein thrombosis in either lower extremity. She did have incompetence of the Rt CFV,FV,PV, GSV, SPJ,  int he calf, as well as Lt SFJ,  in the calf, as well as a VV in the calf.                     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  change goal INR 1.3-2.0     Rosacea              PAST SURGICAL HISTORY:                  Past Surgical History         Past Surgical History:   Procedure Laterality Date     C/SECTION, LOW TRANSVERSE   2008     , Low Transverse      SECTION   2012     Procedure: SECTION; Surgeon:ELY KRAMER; Location:[facility]     COLONOSCOPY N/A 2022     Procedure: COLONOSCOPY, WITH POLYPECTOMY ;  Surgeon: Leventhal, Thomas Michael, [place];  [place] [facility] [place]     RUST EVENT MONITOR (CARDIAC - [place])   10/08     Normal/had palpatiations.      [facility] COLPOSCOPY VULVA W BIOPSY               CURRENT MEDICATIONS:                  Current Outpatient Prescriptions          Current Outpatient Medications   Medication Sig Dispense Refill     BIOTIN PO Take by mouth daily         MAGNESIUM GLYCINATE PO           propranolol (INDERAL) 10 MG tablet Take 1-2 tablets (10-20 mg) by mouth 3 times daily as needed (Anxiety) 60 tablet 3     rivaroxaban ANTICOAGULANT (XARELTO ANTICOAGULANT) 10 MG TABS tablet Take 1 tablet (10 mg) by mouth daily (with dinner) TAKE 1 TABLET BY MOUTH EVERY DAY WITH DINNER 90 tablet 3     STATIN NOT PRESCRIBED (INTENTIONAL) Please choose reason not prescribed from choices below.         VITAMIN D, CHOLECALCIFEROL, PO Take 1,000 Units by mouth daily                ALLERGIES:                        Allergies   Allergen Reactions      Benadryl Allergy Other (See Comments)       Had paradoxical reaction - made her very hyper         SOCIAL HISTORY:                  Social History   Social History            Socioeconomic History     Marital status:        Spouse name: Michael     Number of children: 2     Years of education: Not on file     Highest education level: Not on file   Occupational History     Occupation:       Occupation:     Tobacco Use     Smoking status: Never     Smokeless tobacco: Never   Vaping Use     Vaping Use: Never used   Substance and Sexual Activity     Alcohol use: Yes       Alcohol/week: 1.0 standard drink       Types: 1 Standard drinks or equivalent per week       Comment: rare occ     Drug use: No     Sexual activity: Yes       Partners: Male       Birth control/protection: Male Surgical       Comment:  had vasectomy   Other Topics Concern     Parent/sibling w/ CABG, MI or angioplasty before 65F 55M? No   Social History Narrative     Dairy/d 1-2 servings/d. 3 calcium/mag pills     Caffeine 0-1 servings/d     Exercise 3 week  walks      Sunscreen used - Yes     Seatbelts used - Yes     Working smoke/CO detectors in the home - Yes     Guns stored in the home - No     Self Breast Exams - Yes breastfeeding     Self Testicular Exam - NA     Eye Exam up to date YES     Dental Exam up to date - Yes     Pap Smear up to date - Yes     Mammogram up to date - NA     PSA up to date - NA     Dexa Scan up to date - NA     Flex Sig / Colonoscopy up to date - NA     Immunizations up to date - Yes tetanus vac 2004     Abuse: Current or Past(Physical, Sexual or Emotional)- No     Do you feel safe in your environment - Yes     periods Q 31, last 3-4 days     2/7/06  3/31/09     Emily Hernandez RN      Social Determinants of Health          Financial Resource Strain: Not on file   Food Insecurity: Not on file   Transportation Needs: Not on file   Physical Activity: Not on file   Stress: Not on file   Social Connections: Not  on file   Intimate Partner Violence: Not At Risk     Fear of Current or Ex-Partner: No     Emotionally Abused: No     Physically Abused: No     Sexually Abused: No   Housing Stability: Not on file            FAMILY HISTORY:                   Family History         Family History   Problem Relation Age of Onset     Breast Cancer Mother 63         IDC 2012, recurrent 2018; ER+     Hypertension Mother       Cerebrovascular Disease Mother           Ischemic stroke 3/18 at age 68.     Lymphoma Mother 70         follicular     Lipids Father       Hypertension Father       Circulatory Father           veins stripped      Unknown/Adopted Father           hyperlipidemia      Cataracts Father       Other - See Comments Father           bladder lesions     Blood Disease Sister           FACTOR 5 LEIDEN      Blood Disease Sister           FACTOR 5     Blood Disease Brother           FACTOR 5 LEIDEN HOMOZYGOUS     Breast Cancer Maternal Grandmother 58         contralateral at 74     Diabetes Maternal Grandmother           Type 2     Cerebrovascular Disease Maternal Grandmother       Cardiovascular Maternal Grandmother           MI     Hypertension Maternal Grandmother       Cancer Maternal Grandfather 68         esophageal or stomach?     Substance Abuse Paternal Grandfather       Breast Cancer Maternal Aunt 71     Breast Cancer Maternal Aunt 60     Lymphoma Maternal Aunt 70     Cancer Maternal Aunt 71         vulvar     Pulmonary Embolism Maternal Aunt           bilateral     Cancer Paternal Aunt            at 58; unknown type     Throat cancer Paternal Uncle 55     Brain Cancer Paternal Uncle 61          at 64     Lupus Paternal Uncle                 Physical exam Reveals:     O/P: WNL  HEENT: WNL  NECK: No JVD, thyromegaly, or lymphadenopathy  HEART: RRR, no murmurs, gallops, or rubs  LUNGS: CTA bilaterally without rales, wheezes, or rhonchi  GI: NABS, nondistended, nontender, soft  EXT:without cyanosis, clubbing, or  edema; CEAP C3 venous insufficiency bilaterally.   NEURO: nonfocal  : no flank tenderness           Component      Latest Ref Rng & Units 2005 12/10/2006 2007 2007   D-Dimer      0.0 - 0.50 ug/ml FEU   0.2   0.2   Lupus Result           (Note)     Factor 9 Assay      60 - 140 %     74     Factor 8 Assay      60 - 140 %     118     Factor 11 Assay      60 - 140 %     81     Homocysteine umol/L      4.0 - 12.0 umol/L 5.9         Protein S Free      65 - 125 % 85         Prot C Chromogenic      70 - 130 % 98         Antithrombin III Chromogenic      80 - 120 % 88            Component      Latest Ref Rng & Units 10/2/2007 2015   D-Dimer      0.0 - 0.50 ug/ml FEU 0.3 0.2   Lupus Result             Factor 9 Assay      60 - 140 %       Factor 8 Assay      60 - 140 %       Factor 11 Assay      60 - 140 %       Homocysteine umol/L      4.0 - 12.0 umol/L       Protein S Free      65 - 125 %       Prot C Chromogenic      70 - 130 %       Antithrombin III Chromogenic      80 - 120 %              Name:  Zayra Altamirano                                                           Patient ID: [id number]  Date: 2023                                                    : 1975  Sex: female                                                                 Examined by: KASSI Ocampo RVT  Age:  47 year old                                                         Reading MD: CARMITA     INDICATION:  H/O single VTE of RLE w/PE.  Leiden homozygosity. Planning prophylactic double mastectomy; needing guidance regarding perioperative AC interruption.     EXAM TYPE  BILATERAL LOWER EXTREMITY VENOUS DUPLEX FOR VENOUS INSUFFICIENCY  TECHNICAL SUMMARY     A duplex ultrasound study using color flow was performed, to evaluate the bilateral lower extremity veins for valvular incompetence with the patient in a steep reversed trendelenberg.      RIGHT:     The deep veins demonstrate phasic flow, compress, and respond to  augmentations.  There is no evidence of DVT.  The common femoral, mid femoral, and popliteal veins are incompetent and free of thrombus. The remaining deep veins are competent and free of thrombus.      The GSV demonstrates phasic flow, compresses, and responds to augmentations from the saphenofemoral junction to the ankle with no evidence of thrombus. The greater saphenous vein measures 10.9 mm at the saphenofemoral junction, 5.9 mm in the proximal thigh, and 4.6 mm at the knee. The GSV is incompetent from the SFJ to the proximal thigh and again from the distal thigh to the mid calf with the greatest reflux time of 6929 milliseconds.       The AASV is competent ( 3.5 mm) draining into the saphenofemoral junction.      The Giacomini vein is competent ( 0.7 mm) communicating with the small saphenous vein at the knee level.      The SSV demonstrates phasic flow, compresses, and responds to augmentations from the popliteal space to the ankle.  No  thrombus is seen. The saphenopopliteal junction is absent. The SSV is incompetent in the proximal calf with a reflux time of 2194 milliseconds.       Perforators:  There is an incompetent  vein ( 3.4 mm) at 10 cm from medial malleolus that communicates with the GSV.      LEFT:     The deep veins demonstrate phasic flow, compress, and respond to augmentations.  There is no evidence of DVT.  The common femoral and proximal femoral veins are incompetent and free of thrombus. The remaining deep veins are competent and free of thrombus.      The GSV demonstrates phasic flow, compresses, and responds to augmentations from the saphenofemoral junction to the ankle with no evidence of reflux or thrombus. The greater saphenous vein measures 7.4 mm at the saphenofemoral junction, 5.8 mm in the proximal thigh, and 4.2 mm at the knee. The GSV is incompetent from the SFJ to the proximal thigh with the greatest reflux time of 1848 milliseconds.      The AASV is competent ( 2.8 mm)  draining into the saphenofemoral junction.      The Giacomini vein is competent ( 2.7 mm) communicating with the small saphenous vein at the knee level.     The SSV demonstrates phasic flow, compresses, and responds to augmentations from the popliteal space to the ankle.  No reflux or thrombus is seen. The saphenopopliteal junction is absent.      Perforators: There is an incompetent  vein ( 3.3 mm) at 15 cm from medial malleolus that communicates with a varicose vein measuring 3.3 mm.     FINAL SUMMARY:  1.  No deep or superficial vein thrombosis in either lower extremity  2.  Right common femoral, mid femoral and popliteal vein incompetence  3.  Left common femoral and proximal femoral vein incompetence  4.  Right great saphenous vein incompetence  5.  Right saphenopopliteal junction incompetence  6.  Incompetent  right distal medial calf  7.  Incompetent vein branch coursing from right proximal small saphenous vein  8.  Left saphenofemoral junction incompetence  9.  Incompetent  left distal medial calf, the source of a 3.3 mm diameter vein branch  10.  Incompetent varicosity coursing from the distal left small saphenous vein     Incompetence Criteria:  Greater than 500 milliseconds of reflux measured in the superficial and perforating veins and greater than 1000 milliseconds of reflux measured in the deep veins     SHYANN Chaparro MD, FACS.          BILATERAL LOWER EXTREMITY VENOUS DOPPLER, 1/2/06        HISTORY: Rapid heart rate.  Suspicion of DVT.  Patient has a history   of a clotting disorder.        FINDINGS: There is no evidence of DVT.  All deep venous structures   from common femoral vein to proximal calf vein are compressible with   normal phasic flow and augmentation.        IMPRESSION: No evidence of DVT bilaterally.  Results were faxed to   the Emergency Department by Dr Carrion at the completion of the   study.  Previously seen calf vein thrombosis on the study  from   12/13/05 has resolved.  Patient is on Coumadin.        A/P:     (Z86.718) H/O single lifetime VTE of RLE DVT w/ PE provoked in association with Nuva Ring use, also with Leiden homozygosity, on indefinite AC with xarelto 10 mg daily.  (primary encounter diagnosis)    Comment:     She should remain on Xarelto until the time preceding surgery. She should hod it ideally form a VTE perspective for 24 to 48 hours preoperatively. If periprocedural concerns specific to the surgery would place her at higher postop bleeding risk and wound healing risk, it would not be unreasonable to hold Xarelto for 72 hours preoperatively. She should be reACd as soon as hemostasis is felt to be achieved postoperatively.     We obtained a US Venous Competency Bilateral on 4/11/2023 revealing no deep or superficial vein thrombosis in either lower extremity. She did have incompetence of the Rt CFV,FV,PV, GSV, SPJ,  int he calf, as well as Lt SFJ,  in the calf, as well as a VV in the calf.    Plan:     Her venous insufficiency does place her at a higher postop VTE recurrence risk. A prescription for compression hosiery based upon the above anatomical findings would ultimately be either thigh high or pantyhose.             45 minutes total medical care on today's date. Multiple questions answered for the patient.

## 2023-06-12 NOTE — TELEPHONE ENCOUNTER
"Reason for Call:  Other prescription- JANTOVEN      Detailed comments: Pharmacy is requesting a call back. States they received new script in August for 7.5 mng. However, script sent in April for 2.5 mg still has refills also. Their question is: \"Should we cancel one of these or should she be taking it as a combo?\" Please advise, thank you!    [facility] : [phone], Ref# [id number]    Phone Number Patient can be reached at: Home number on file [phone] (home)    Best Time: anytime    Can we leave a detailed message on this number? NO    Call taken on 9/13/2018 at 12:54 PM by Muriel Carson      "
Aryan at [facility] informed.  Evelin Contreras RN    
Please have them cancel the 2.5 dose.    
Received supine denies pain +IV hep. Left as found +RN aware, call bell

## 2023-07-07 ENCOUNTER — TELEPHONE (OUTPATIENT)
Dept: FAMILY MEDICINE | Facility: CLINIC | Age: 48
End: 2023-07-07
Payer: COMMERCIAL

## 2023-07-07 NOTE — TELEPHONE ENCOUNTER
Son with strep, She has sore throat and wants rx    Advised e visit    Ivory Jensen RN, BSN  [facility]

## 2023-07-14 ENCOUNTER — OFFICE VISIT (OUTPATIENT)
Dept: SURGERY | Facility: CLINIC | Age: 48
End: 2023-07-14
Payer: COMMERCIAL

## 2023-07-14 ENCOUNTER — TELEPHONE (OUTPATIENT)
Dept: FAMILY MEDICINE | Facility: CLINIC | Age: 48
End: 2023-07-14

## 2023-07-14 ENCOUNTER — MEDICAL CORRESPONDENCE (OUTPATIENT)
Dept: HEALTH INFORMATION MANAGEMENT | Facility: CLINIC | Age: 48
End: 2023-07-14

## 2023-07-14 ENCOUNTER — TELEPHONE (OUTPATIENT)
Dept: SURGERY | Facility: CLINIC | Age: 48
End: 2023-07-14

## 2023-07-14 VITALS
DIASTOLIC BLOOD PRESSURE: 78 MMHG | SYSTOLIC BLOOD PRESSURE: 110 MMHG | HEIGHT: 70 IN | WEIGHT: 187 LBS | HEART RATE: 74 BPM | BODY MASS INDEX: 26.77 KG/M2

## 2023-07-14 DIAGNOSIS — Z91.89 AT HIGH RISK FOR BREAST CANCER: Primary | ICD-10-CM

## 2023-07-14 PROCEDURE — 99214 OFFICE O/P EST MOD 30 MIN: CPT | Performed by: SURGERY

## 2023-07-14 NOTE — PROGRESS NOTES
[facility] Follow Up Note    CHIEF COMPLAINT:  High risk breast cancer    HISTORY OF PRESENT ILLNESS:  Zayra Altamirano is a 48 year old female who is seen in follow up due to increased lifetime risk for breast cancer.    She also has a history of prior DVT and PE associated with NuvaRing use and has factor V Leiden and is currently on Xarelto.    I had initially met Zayra on 12/6/2022.  From my note then, Her GREGORIO score calculator predicts a 22.5% lifetime risk for breast cancer. She had genetic testing which was negative on 10/25/2022. She has not had prior breast surgeries or biopsies. She does have a history of factor V leiden and is on xarelto. She is interested in possible prophylactic mastectomies for risk reduction and here to discuss her options. Her history of factor V leiden does factor into her decision making and her goal of maximum risk reduction as she saw her mother deal with clotting issues related to hormone blockade.  She has a significant family history of breast cancer including her mother, two maternal aunts and maternal grandmother. Genetic testing for pt and her mother as well as one aunt was negative.  She wears a 34H bra.     She was scheduled for bilateral risk reducing mastectomies and ultimately canceled this procedure and elected to continue with high risk screening.  She had previously met with Prince Esteban and Rosamaria as she was interested in [place] type reconstruction.  She was seen at [place] and discussed nerve sparing mastectomy and ultimately canceled the surgery there and is interested in surgery here again.    She had a benign MRI of the breast on 3/8/2023 and would be due for mammogram on 9/19/2023.     Hormonal history:   menarche 11,  2 children, 1st at age 32, pre menopausal, 5 years OCP use, no HRT, no fertility treatment.      Family history of breast cancer: Yes - as above  Family history of ovarian cancer:  No  Family history of colon cancer:  No  Family history of prostate cancer: No    REVIEW OF SYSTEMS:  Constitutional:  Negative for chills, fatigue, fever and weight change.  Eyes:  Negative for blurred vision, eye pain and photophobia.  ENT:  Negative for hearing problems, ENT pain, congestion, rhinorrhea, epistaxis, hoarseness and dental problems.  Cardiovascular:  Negative for chest pain, palpitations, tachycardia, orthopnea and edema.  Respiratory:  Negative for cough, dyspnea and hemoptysis.  Gastrointestinal:  Negative for abdominal pain, heartburn, constipation, diarrhea and stool changes.  Musculoskeletal:  Negative for arthralgias, back pain and myalgias.  Integumentary/Breast:  See HPI.    Past Medical History:   Diagnosis Date     Congenital deficiency of other clotting factors     Factor 5 Leiden homozygous mutation     DVT of leg (deep venous thrombosis) (H) 2005     Embolism and thrombosis of unspecified site     left leg that traveled to become PE     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  change goal INR 1.3-2.0     Rosacea        Past Surgical History:   Procedure Laterality Date     C/SECTION, LOW TRANSVERSE  2008    , Low Transverse      SECTION  2012    Procedure: SECTION; Surgeon:ELY KRAMER; Location:[facility] L+D     COLONOSCOPY N/A 2022    Procedure: COLONOSCOPY, WITH POLYPECTOMY ;  Surgeon: Leventhal, Thomas Michael, MD;  [place] [facility] [place]     [facility] EVENT MONITOR ([place]ARDIAC - [place])  10/08    Normal/had palpatiations.      [facility] COLPOSCOPY VULVA W BIOPSY         Family History   Problem Relation Age of Onset      Breast Cancer Mother 63        IDC 2012, recurrent 2018; ER+     Hypertension Mother      Cerebrovascular Disease Mother         Ischemic stroke 318 at age 68.     Lymphoma Mother 70        follicular     Lipids Father      Hypertension Father      Circulatory Father         veins stripped      Unknown/Adopted Father         hyperlipidemia      Cataracts Father      Other - See Comments Father         bladder lesions     Blood Disease Sister         FACTOR 5 LEIDEN      Blood Disease Sister         FACTOR 5     Blood Disease Brother         FACTOR 5 LEIDEN HOMOZYGOUS     Breast Cancer Maternal Grandmother 58        contralateral at 74     Diabetes Maternal Grandmother         Type 2     Cerebrovascular Disease Maternal Grandmother      Cardiovascular Maternal Grandmother         MI     Hypertension Maternal Grandmother      Cancer Maternal Grandfather 68        esophageal or stomach?     Substance Abuse Paternal Grandfather      Breast Cancer Maternal Aunt 71     Breast Cancer Maternal Aunt 60     Lymphoma Maternal Aunt 70     Cancer Maternal Aunt 71        vulvar     Pulmonary Embolism Maternal Aunt         bilateral     Cancer Paternal Aunt          at 58; unknown type     Throat cancer Paternal Uncle 55     Brain Cancer Paternal Uncle 61         at 64     Lupus Paternal Uncle        Social History     Tobacco Use     Smoking status: Never     Smokeless tobacco: Never   Substance Use Topics     Alcohol use: Yes     Alcohol/week: 1.0 standard drink of alcohol     Types: 1 Standard drinks or equivalent per week     Comment: rare occ       Patient Active Problem List   Diagnosis     Embolism and thrombosis (H)     Pulmonary embolism and infarction (H)     History of gestational diabetes     CARDIOVASCULAR SCREENING; LDL GOAL LESS THAN 160     Long term current use of anticoagulant therapy     Ankle pain, left     Ankle pain, right     Homozygous Factor V Leiden mutation (H)     Other pulmonary embolism  "without acute cor pulmonale, unspecified chronicity (H)     Anxiety about health     Abnormal maternal glucose tolerance, complicating pregnancy, childbirth, or the puerperium, unspecified as to episode of care     Activated protein C resistance (H)     Allergies   Allergen Reactions     Benadryl Allergy Other (See Comments)     Had paradoxical reaction - made her very hyper     Current Outpatient Medications   Medication Sig Dispense Refill     BIOTIN PO Take by mouth daily       MAGNESIUM GLYCINATE PO        propranolol (INDERAL) 10 MG tablet Take 1-2 tablets (10-20 mg) by mouth 3 times daily as needed (Anxiety) 60 tablet 3     rivaroxaban ANTICOAGULANT (XARELTO ANTICOAGULANT) 10 MG TABS tablet Take 1 tablet (10 mg) by mouth daily (with dinner) TAKE 1 TABLET BY MOUTH EVERY DAY WITH DINNER 90 tablet 3     STATIN NOT PRESCRIBED (INTENTIONAL) Please choose reason not prescribed from choices below.       VITAMIN D, CHOLECALCIFEROL, PO Take 1,000 Units by mouth daily       Vitals: /78   Pulse 74   Ht 1.778 m (5' 10\")   Wt 84.8 kg (187 lb)   BMI 26.83 kg/m    BMI= Body mass index is 26.83 kg/m .    EXAM:  GENERAL: healthy, alert and no distress   BREAST:  The breasts appear symmetric with no overlying skin changes.  Breasts are large and pendulous.  The nipples are normal bilaterally.  There is no dimpling or thickening of the skin.  No mass is appreciated in either breast.  The breast tissue is generally heterogeneously dense.  There is no axillary or supraclavicular lymphadenopathy.  CARDIOVASCULAR:  RRR  RESPIRATORY: nonlabored breathing  NECK: Neck supple. No adenopathy. Thyroid symmetric, normal size  SKIN: No suspicious lesions or rashes  LYMPH: Normal cervical lymph nodes      ASSESSMENT/PLAN:  Zayra Altamirano is a 48-year-old female with increased lifetime risk for breast cancer who is interested in prophylactic mastectomies.  We again reviewed the surgical details including the risks, benefits, " indications and alternatives and she is interested in proceeding with scheduling.  She had previously met with Dr. Clement and would like to proceed with [place] type reconstruction and would also like to see him back in clinic prior to surgery to review any additional questions.  We discussed expectations following surgery including drain placement and postoperative pain control.  We discussed a pec block after induction of general anesthesia as well as likely Coreas catheter placement.  We will have her spend a night postoperatively in the hospital and anticipate she will be ready to discharge the next day.  We did discuss preservation of the lateral intercostal neurovascular bundles which can improve sensation laterally following surgery.          Shayy Kebede MD  [facility]  [phone]        Please route or send letter to:  Primary Care Provider (PCP) and Referring Provider

## 2023-07-14 NOTE — TELEPHONE ENCOUNTER
Type of surgery: bilateral skin sparing mastectomies combined with Dr Cano  Location of surgery: [facility]  Date and time of surgery: 8/16/23 7:30am  Surgeon: Dr Kebede  Pre-Op Appt Date: pt to schedule  Post-Op Appt Date: pt to schedule   Packet sent out: Yes  Pre-cert/Authorization completed:  Not Applicable  Date: 7/14/23

## 2023-07-15 NOTE — TELEPHONE ENCOUNTER
Reason for Call:  Appointment Request    Patient requesting this type of appt: Pre-op    Requested provider: Allie Cotter    Reason patient unable to be scheduled: Not within requested timeframe    When does patient want to be seen/preferred time: 7/31/2023-8/12/2023    Comments: Patient sent in a request through [employer] and central scheduling is unable to work her into Allie's schedule. Her surgery is 08/16    Could we send this information to you in [facility] or would you prefer to receive a phone call?:   Patient would prefer a phone call   Okay to leave a detailed message?: Yes at Cell number on file:    Telephone Information:   Mobile [phone]     7/14/2023 at 11:36 PM by Pily Patel

## 2023-08-04 ENCOUNTER — OFFICE VISIT (OUTPATIENT)
Dept: FAMILY MEDICINE | Facility: CLINIC | Age: 48
End: 2023-08-04
Payer: COMMERCIAL

## 2023-08-04 VITALS
HEART RATE: 87 BPM | BODY MASS INDEX: 27.2 KG/M2 | RESPIRATION RATE: 19 BRPM | OXYGEN SATURATION: 99 % | SYSTOLIC BLOOD PRESSURE: 122 MMHG | HEIGHT: 70 IN | DIASTOLIC BLOOD PRESSURE: 75 MMHG | TEMPERATURE: 97.5 F | WEIGHT: 190 LBS

## 2023-08-04 DIAGNOSIS — D68.51 ACTIVATED PROTEIN C RESISTANCE (H): ICD-10-CM

## 2023-08-04 DIAGNOSIS — I26.99 OTHER PULMONARY EMBOLISM WITHOUT ACUTE COR PULMONALE, UNSPECIFIED CHRONICITY (H): ICD-10-CM

## 2023-08-04 DIAGNOSIS — Z91.89 AT HIGH RISK FOR BREAST CANCER: ICD-10-CM

## 2023-08-04 DIAGNOSIS — Z01.818 PREOP GENERAL PHYSICAL EXAM: Primary | ICD-10-CM

## 2023-08-04 DIAGNOSIS — Z79.01 LONG TERM CURRENT USE OF ANTICOAGULANT THERAPY: ICD-10-CM

## 2023-08-04 DIAGNOSIS — D68.51 HOMOZYGOUS FACTOR V LEIDEN MUTATION (H): ICD-10-CM

## 2023-08-04 LAB
ANION GAP SERPL CALCULATED.3IONS-SCNC: 8 MMOL/L (ref 7–15)
BUN SERPL-MCNC: 11.6 MG/DL (ref 6–20)
CALCIUM SERPL-MCNC: 9.5 MG/DL (ref 8.6–10)
CHLORIDE SERPL-SCNC: 104 MMOL/L (ref 98–107)
CREAT SERPL-MCNC: 0.79 MG/DL (ref 0.51–0.95)
DEPRECATED HCO3 PLAS-SCNC: 28 MMOL/L (ref 22–29)
ERYTHROCYTE [DISTWIDTH] IN BLOOD BY AUTOMATED COUNT: 12.2 % (ref 10–15)
GFR SERPL CREATININE-BSD FRML MDRD: >90 ML/MIN/1.73M2
GLUCOSE SERPL-MCNC: 76 MG/DL (ref 70–99)
HCT VFR BLD AUTO: 41.6 % (ref 35–47)
HGB BLD-MCNC: 14.2 G/DL (ref 11.7–15.7)
MCH RBC QN AUTO: 30.2 PG (ref 26.5–33)
MCHC RBC AUTO-ENTMCNC: 34.1 G/DL (ref 31.5–36.5)
MCV RBC AUTO: 89 FL (ref 78–100)
PLATELET # BLD AUTO: 274 10E3/UL (ref 150–450)
POTASSIUM SERPL-SCNC: 4.6 MMOL/L (ref 3.4–5.3)
RBC # BLD AUTO: 4.7 10E6/UL (ref 3.8–5.2)
SODIUM SERPL-SCNC: 140 MMOL/L (ref 136–145)
WBC # BLD AUTO: 6.7 10E3/UL (ref 4–11)

## 2023-08-04 PROCEDURE — 99214 OFFICE O/P EST MOD 30 MIN: CPT | Performed by: NURSE PRACTITIONER

## 2023-08-04 PROCEDURE — 85027 COMPLETE CBC AUTOMATED: CPT | Performed by: NURSE PRACTITIONER

## 2023-08-04 PROCEDURE — 36415 COLL VENOUS BLD VENIPUNCTURE: CPT | Performed by: NURSE PRACTITIONER

## 2023-08-04 PROCEDURE — 80048 BASIC METABOLIC PNL TOTAL CA: CPT | Performed by: NURSE PRACTITIONER

## 2023-08-04 ASSESSMENT — PAIN SCALES - GENERAL: PAINLEVEL: MILD PAIN (3)

## 2023-08-04 NOTE — PROGRESS NOTES
[address]  [address]  [place] [address]  Phone: [phone]  Fax: [phone]  Primary Provider: Allie Cotter  Pre-op Performing Provider: ALLIE COTTER      PREOPERATIVE EVALUATION:  Today's date: 8/4/2023    Zayra Altamirano is a 48 year old female who presents for a preoperative evaluation.      8/4/2023    10:48 AM   Additional Questions   Roomed by Amanda ERVIN       Surgical Information:  Surgery/Procedure: Bilateral Skin sparing mastectomies & Bilateral breast reconstruction with mastopexy technique  Surgery Location: [facility]  Surgeon: Shayy Kebede &Jerome Daivd  Surgery Date: 08/16/2023  Time of Surgery: 07:30AM  Where patient plans to recover: At home with family  Fax number for surgical facility: Note does not need to be faxed, will be available electronically in Epic.    Assessment & Plan     The proposed surgical procedure is considered INTERMEDIATE risk.    Preop general physical exam  - CBC with platelets; Future  - Basic metabolic panel  (Ca, Cl, CO2, Creat, Gluc, K, Na, BUN); Future  - CBC with platelets  - Basic metabolic panel  (Ca, Cl, CO2, Creat, Gluc, K, Na, BUN)    At high risk for breast cancer      Other pulmonary embolism without acute cor pulmonale, unspecified chronicity (H)  Homozygous Factor V Leiden mutation (H)  Activated protein C resistance (H)  Long term current use of anticoagulant therapy  On DOAC, would recommend Lovenox bridge prior to surgery.  Will defer to surgeon when it is ok to resume, she has an upcoming appointment with plastics and will let me know if she needs the lovenox ordered.              - No identified additional risk factors other than previously addressed    Antiplatelet or Anticoagulation Medication Instructions:   - apixaban (Eliquis), edoxaban (Savaysa), rivaroxaban (Xarelto): Bleeding risk is moderate or high for this procedure AND CrCl  (>=) 50 mL/min. HOLD 2 days  before surgery.     Additional Medication Instructions:  Patient is to take all scheduled medications on the day of surgery    RECOMMENDATION:  APPROVAL GIVEN to proceed with proposed procedure, without further diagnostic evaluation.            Subjective       HPI related to upcoming procedure: Bilateral Skin sparing mastectomies & Bilateral breast reconstruction with mastopexy technique        7/30/2023    10:46 PM   Preop Questions   1. Have you ever had a heart attack or stroke? No   2. Have you ever had surgery on your heart or blood vessels, such as a stent placement, a coronary artery bypass, or surgery on an artery in your head, neck, heart, or legs? No   3. Do you have chest pain with activity? No   4. Do you have a history of  heart failure? No   5. Do you currently have a cold, bronchitis or symptoms of other infection? No   6. Do you have a cough, shortness of breath, or wheezing? No   7. Do you or anyone in your family have previous history of blood clots? YES -  On Xarelto   8. Do you or does anyone in your family have a serious bleeding problem such as prolonged bleeding following surgeries or cuts? No   9. Have you ever had problems with anemia or been told to take iron pills? No   10. Have you had any abnormal blood loss such as black, tarry or bloody stools, or abnormal vaginal bleeding? No   11. Have you ever had a blood transfusion? No   12. Are you willing to have a blood transfusion if it is medically needed before, during, or after your surgery? Yes   13. Have you or any of your relatives ever had problems with anesthesia? UNKNOWN    14. Do you have sleep apnea, excessive snoring or daytime drowsiness? UNKNOWN    15. Do you have any artifical heart valves or other implanted medical devices like a pacemaker, defibrillator, or continuous glucose monitor? No   16. Do you have artificial joints? No   17. Are you allergic to latex? No   18. Is there any chance that you may be pregnant? No        Health Care Directive:  Patient does not have a Health Care Directive or Living Will: Discussed advance care planning with patient; information given to patient to review.    Preoperative Review of :   reviewed - no record of controlled substances prescribed.          Review of Systems  CONSTITUTIONAL: NEGATIVE for fever, chills, change in weight  INTEGUMENTARY/SKIN: NEGATIVE for worrisome rashes, moles or lesions  EYES: NEGATIVE for vision changes or irritation  ENT/MOUTH: NEGATIVE for ear, mouth and throat problems  RESP: NEGATIVE for significant cough or SOB  CV: NEGATIVE for chest pain, palpitations or peripheral edema  GI: NEGATIVE for nausea, abdominal pain, heartburn, or change in bowel habits  : NEGATIVE for frequency, dysuria, or hematuria  MUSCULOSKELETAL: NEGATIVE for significant arthralgias or myalgia  NEURO: NEGATIVE for weakness, dizziness or paresthesias  ENDOCRINE: NEGATIVE for temperature intolerance, skin/hair changes  HEME: NEGATIVE for bleeding problems  PSYCHIATRIC: NEGATIVE for changes in mood or affect    Patient Active Problem List    Diagnosis Date Noted    History of gestational diabetes 10/19/2011     Priority: High     Consider testing at 20w      Abnormal maternal glucose tolerance, complicating pregnancy, childbirth, or the puerperium, unspecified as to episode of care 11/18/2022     Priority: Medium     Formatting of this note might be different from the original.  Created by Conversion      Homozygous Factor V Leiden mutation (H) 01/16/2017     Priority: Medium    Other pulmonary embolism without acute cor pulmonale, unspecified chronicity (H) 01/16/2017     Priority: Medium    Anxiety about health 01/16/2017     Priority: Medium    Activated protein C resistance (H) 01/16/2017     Priority: Medium    Long term current use of anticoagulant therapy 05/10/2016     Priority: Medium    Ankle pain, left 05/10/2016     Priority: Medium    Ankle pain, right 05/10/2016      Priority: Medium    CARDIOVASCULAR SCREENING; LDL GOAL LESS THAN 160 2012     Priority: Medium    Embolism and thrombosis (H) 2005     Priority: Medium     left  Problem list name updated by automated process. Provider to review      Pulmonary embolism and infarction (H) 2005     Priority: Medium     Problem list name updated by automated process. 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  change goal INR 1.3-2.0    Rosacea      Past Surgical History:   Procedure Laterality Date    C/SECTION, LOW TRANSVERSE  2008    , Low Transverse     SECTION  2012    Procedure: SECTION; Surgeon:ELY KRAMER; Location:[facility]    COLONOSCOPY N/A 2022    Procedure: COLONOSCOPY, WITH POLYPECTOMY ;  Surgeon: Leventhal, Thomas Michael, MD;  Location: [facility] [place]    [facility] EVENT MONITOR ([place]AC - [place])  10/08    Normal/had palpatiations.     [facility] COLPOSCOPY VULVA W BIOPSY       Current Outpatient Medications   Medication Sig Dispense Refill    rivaroxaban ANTICOAGULANT (XARELTO ANTICOAGULANT) 10 MG TABS tablet Take 1 tablet (10 mg) by mouth daily (with dinner) TAKE 1 TABLET BY MOUTH EVERY DAY WITH DINNER 90 tablet 3    VITAMIN D, CHOLECALCIFEROL, PO Take 1,000 Units by mouth daily      BIOTIN PO Take by mouth daily  "(Patient not taking: Reported on 7/14/2023)      MAGNESIUM GLYCINATE PO  (Patient not taking: Reported on 7/14/2023)      propranolol (INDERAL) 10 MG tablet Take 1-2 tablets (10-20 mg) by mouth 3 times daily as needed (Anxiety) (Patient not taking: Reported on 8/4/2023) 60 tablet 3    STATIN NOT PRESCRIBED (INTENTIONAL) Please choose reason not prescribed from choices below. (Patient not taking: Reported on 8/4/2023)         Allergies   Allergen Reactions    Benadryl Allergy Other (See Comments)     Had paradoxical reaction - made her very hyper        Social History     Tobacco Use    Smoking status: Never    Smokeless tobacco: Never   Substance Use Topics    Alcohol use: Yes     Alcohol/week: 1.0 standard drink of alcohol     Types: 1 Standard drinks or equivalent per week     Comment: rare occ       History   Drug Use No         Objective     /75 (BP Location: Right arm, Patient Position: Sitting, Cuff Size: Adult Regular)   Pulse 87   Temp 97.5  F (36.4  C) (Temporal)   Resp 19   Ht 1.773 m (5' 9.8\")   Wt 86.2 kg (190 lb)   LMP 07/19/2023 (Exact Date)   SpO2 99%   BMI 27.42 kg/m      Physical Exam    GENERAL APPEARANCE: healthy, alert and no distress     EYES: EOMI, PERRL     HENT: ear canals and TM's normal and nose and mouth without ulcers or lesions     NECK: no adenopathy, no asymmetry, masses, or scars and thyroid normal to palpation     RESP: lungs clear to auscultation - no rales, rhonchi or wheezes     CV: regular rates and rhythm, normal S1 S2, no S3 or S4 and no murmur, click or rub     ABDOMEN:  soft, nontender, no HSM or masses and bowel sounds normal     MS: extremities normal- no gross deformities noted, no evidence of inflammation in joints, FROM in all extremities.     SKIN: no suspicious lesions or rashes     NEURO: Normal strength and tone, sensory exam grossly normal, mentation intact and speech normal     PSYCH: mentation appears normal. and affect normal/bright     LYMPHATICS: " No cervical adenopathy    Recent Labs   Lab Test 03/14/23  1651 01/02/23  1037 08/04/22  0818   HGB 14.8 14.5  --     275  --     140  --    POTASSIUM 4.1 4.3  --    CR 0.83 0.82  --    A1C  --  5.2 5.1        Diagnostics:  Labs pending at this time.  Results will be reviewed when available.   No EKG required, no history of coronary heart disease, significant arrhythmia, peripheral arterial disease or other structural heart disease.    Revised Cardiac Risk Index (RCRI):  The patient has the following serious cardiovascular risks for perioperative complications:   - No serious cardiac risks = 0 points     RCRI Interpretation: 0 points: Class I (very low risk - 0.4% complication rate)         Signed Electronically by: SWAPNA Merida CNP  Copy of this evaluation report is provided to requesting physician.

## 2023-08-04 NOTE — PATIENT INSTRUCTIONS
For informational purposes only. Not to replace the advice of your health care provider. Copyright   2003,  [facility]. All rights reserved. Clinically reviewed by Zayra Mccoy MD. LISNR [id number] - REV .  Preparing for Your Surgery  Getting started  A nurse will call you to review your health history and instructions. They will give you an arrival time based on your scheduled surgery time. Please be ready to share:  Your doctor's clinic name and phone number  Your medical, surgical, and anesthesia history  A list of allergies and sensitivities  A list of medicines, including herbal treatments and over-the-counter drugs  Whether the patient has a legal guardian (ask how to send us the papers in advance)  Please tell us if you're pregnant--or if there's any chance you might be pregnant. Some surgeries may injure a fetus (unborn baby), so they require a pregnancy test. Surgeries that are safe for a fetus don't always need a test, and you can choose whether to have one.   If you have a child who's having surgery, please ask for a copy of Preparing for Your Child's Surgery.    Preparing for surgery  Within 10 to 30 days of surgery: Have a pre-op exam (sometimes called an H&P, or History and Physical). This can be done at a clinic or pre-operative center.  If you're having a , you may not need this exam. Talk to your care team.  At your pre-op exam, talk to your care team about all medicines you take. If you need to stop any medicines before surgery, ask when to start taking them again.  We do this for your safety. Many medicines can make you bleed too much during surgery. Some change how well surgery (anesthesia) drugs work.  Call your insurance company to let them know you're having surgery. (If you don't have insurance, call [phone].)  Call your clinic if there's any change in your health. This includes signs of a cold or flu (sore throat, runny nose, cough, rash, fever). It also  includes a scrape or scratch near the surgery site.  If you have questions on the day of surgery, call your hospital or surgery center.  Eating and drinking guidelines  For your safety: Unless your surgeon tells you otherwise, follow the guidelines below.  Eat and drink as usual until 8 hours before you arrive for surgery. After that, no food or milk.  Drink clear liquids until 2 hours before you arrive. These are liquids you can see through, like water, Gatorade, and Propel Water. They also include plain black coffee and tea (no cream or milk), candy, and breath mints. You can spit out gum when you arrive.  If you drink alcohol: Stop drinking it the night before surgery.  If your care team tells you to take medicine on the morning of surgery, it's okay to take it with a sip of water.  Preventing infection  Shower or bathe the night before and morning of your surgery. Follow the instructions your clinic gave you. (If no instructions, use regular soap.)  Don't shave or clip hair near your surgery site. We'll remove the hair if needed.  Don't smoke or vape the morning of surgery. You may chew nicotine gum up to 2 hours before surgery. A nicotine patch is okay.  Note: Some surgeries require you to completely quit smoking and nicotine. Check with your surgeon.  Your care team will make every effort to keep you safe from infection. We will:  Clean our hands often with soap and water (or an alcohol-based hand rub).  Clean the skin at your surgery site with a special soap that kills germs.  Give you a special gown to keep you warm. (Cold raises the risk of infection.)  Wear special hair covers, masks, gowns and gloves during surgery.  Give antibiotic medicine, if prescribed. Not all surgeries need antibiotics.  What to bring on the day of surgery  Photo ID and insurance card  Copy of your health care directive, if you have one  Glasses and hearing aids (bring cases)  You can't wear contacts during surgery  Inhaler and eye  drops, if you use them (tell us about these when you arrive)  CPAP machine or breathing device, if you use them  A few personal items, if spending the night  If you have . . .  A pacemaker, ICD (cardiac defibrillator) or other implant: Bring the ID card.  An implanted stimulator: Bring the remote control.  A legal guardian: Bring a copy of the certified (court-stamped) guardianship papers.  Please remove any jewelry, including body piercings. Leave jewelry and other valuables at home.  If you're going home the day of surgery  You must have a responsible adult drive you home. They should stay with you overnight as well.  If you don't have someone to stay with you, and you aren't safe to go home alone, we may keep you overnight. Insurance often won't pay for this.  After surgery  If it's hard to control your pain or you need more pain medicine, please call your surgeon's office.  Questions?   If you have any questions for your care team, list them here: _________________________________________________________________________________________________________________________________________________________________________ ____________________________________ ____________________________________ ____________________________________

## 2023-08-04 NOTE — H&P (VIEW-ONLY)
[address]  [address]  [place] [address]  Phone: [phone]  Fax: [phone]  Primary Provider: Allie Cotter  Pre-op Performing Provider: ALLIE COTTER      PREOPERATIVE EVALUATION:  Today's date: 8/4/2023    Zayra Altamirano is a 48 year old female who presents for a preoperative evaluation.      8/4/2023    10:48 AM   Additional Questions   Roomed by Amanda ERVIN       Surgical Information:  Surgery/Procedure: Bilateral Skin sparing mastectomies & Bilateral breast reconstruction with mastopexy technique  Surgery Location: [facility]  Surgeon: Shayy Kebede &Jerome Daivd  Surgery Date: 08/16/2023  Time of Surgery: 07:30AM  Where patient plans to recover: At home with family  Fax number for surgical facility: Note does not need to be faxed, will be available electronically in Epic.    Assessment & Plan     The proposed surgical procedure is considered INTERMEDIATE risk.    Preop general physical exam  - CBC with platelets; Future  - Basic metabolic panel  (Ca, Cl, CO2, Creat, Gluc, K, Na, BUN); Future  - CBC with platelets  - Basic metabolic panel  (Ca, Cl, CO2, Creat, Gluc, K, Na, BUN)    At high risk for breast cancer      Other pulmonary embolism without acute cor pulmonale, unspecified chronicity (H)  Homozygous Factor V Leiden mutation (H)  Activated protein C resistance (H)  Long term current use of anticoagulant therapy  On DOAC, would recommend Lovenox bridge prior to surgery.  Will defer to surgeon when it is ok to resume, she has an upcoming appointment with plastics and will let me know if she needs the lovenox ordered.              - No identified additional risk factors other than previously addressed    Antiplatelet or Anticoagulation Medication Instructions:   - apixaban (Eliquis), edoxaban (Savaysa), rivaroxaban (Xarelto): Bleeding risk is moderate or high for this procedure AND CrCl  (>=) 50 mL/min. HOLD 2 days  before surgery.     Additional Medication Instructions:  Patient is to take all scheduled medications on the day of surgery    RECOMMENDATION:  APPROVAL GIVEN to proceed with proposed procedure, without further diagnostic evaluation.            Subjective       HPI related to upcoming procedure: Bilateral Skin sparing mastectomies & Bilateral breast reconstruction with mastopexy technique        7/30/2023    10:46 PM   Preop Questions   1. Have you ever had a heart attack or stroke? No   2. Have you ever had surgery on your heart or blood vessels, such as a stent placement, a coronary artery bypass, or surgery on an artery in your head, neck, heart, or legs? No   3. Do you have chest pain with activity? No   4. Do you have a history of  heart failure? No   5. Do you currently have a cold, bronchitis or symptoms of other infection? No   6. Do you have a cough, shortness of breath, or wheezing? No   7. Do you or anyone in your family have previous history of blood clots? YES -  On Xarelto   8. Do you or does anyone in your family have a serious bleeding problem such as prolonged bleeding following surgeries or cuts? No   9. Have you ever had problems with anemia or been told to take iron pills? No   10. Have you had any abnormal blood loss such as black, tarry or bloody stools, or abnormal vaginal bleeding? No   11. Have you ever had a blood transfusion? No   12. Are you willing to have a blood transfusion if it is medically needed before, during, or after your surgery? Yes   13. Have you or any of your relatives ever had problems with anesthesia? UNKNOWN    14. Do you have sleep apnea, excessive snoring or daytime drowsiness? UNKNOWN    15. Do you have any artifical heart valves or other implanted medical devices like a pacemaker, defibrillator, or continuous glucose monitor? No   16. Do you have artificial joints? No   17. Are you allergic to latex? No   18. Is there any chance that you may be pregnant? No        Health Care Directive:  Patient does not have a Health Care Directive or Living Will: Discussed advance care planning with patient; information given to patient to review.    Preoperative Review of :   reviewed - no record of controlled substances prescribed.          Review of Systems  CONSTITUTIONAL: NEGATIVE for fever, chills, change in weight  INTEGUMENTARY/SKIN: NEGATIVE for worrisome rashes, moles or lesions  EYES: NEGATIVE for vision changes or irritation  ENT/MOUTH: NEGATIVE for ear, mouth and throat problems  RESP: NEGATIVE for significant cough or SOB  CV: NEGATIVE for chest pain, palpitations or peripheral edema  GI: NEGATIVE for nausea, abdominal pain, heartburn, or change in bowel habits  : NEGATIVE for frequency, dysuria, or hematuria  MUSCULOSKELETAL: NEGATIVE for significant arthralgias or myalgia  NEURO: NEGATIVE for weakness, dizziness or paresthesias  ENDOCRINE: NEGATIVE for temperature intolerance, skin/hair changes  HEME: NEGATIVE for bleeding problems  PSYCHIATRIC: NEGATIVE for changes in mood or affect    Patient Active Problem List    Diagnosis Date Noted    History of gestational diabetes 10/19/2011     Priority: High     Consider testing at 20w      Abnormal maternal glucose tolerance, complicating pregnancy, childbirth, or the puerperium, unspecified as to episode of care 11/18/2022     Priority: Medium     Formatting of this note might be different from the original.  Created by Conversion      Homozygous Factor V Leiden mutation (H) 01/16/2017     Priority: Medium    Other pulmonary embolism without acute cor pulmonale, unspecified chronicity (H) 01/16/2017     Priority: Medium    Anxiety about health 01/16/2017     Priority: Medium    Activated protein C resistance (H) 01/16/2017     Priority: Medium    Long term current use of anticoagulant therapy 05/10/2016     Priority: Medium    Ankle pain, left 05/10/2016     Priority: Medium    Ankle pain, right 05/10/2016      Priority: Medium    CARDIOVASCULAR SCREENING; LDL GOAL LESS THAN 160 2012     Priority: Medium    Embolism and thrombosis (H) 2005     Priority: Medium     left  Problem list name updated by automated process. Provider to review      Pulmonary embolism and infarction (H) 2005     Priority: Medium     Problem list name updated by automated process. 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  change goal INR 1.3-2.0    Rosacea      Past Surgical History:   Procedure Laterality Date    C/SECTION, LOW TRANSVERSE  2008    , Low Transverse     SECTION  2012    Procedure: SECTION; Surgeon:ELY KRAMER; Location:[facility] L+D    COLONOSCOPY N/A 2022    Procedure: COLONOSCOPY, WITH POLYPECTOMY ;  Surgeon: Leventhal, Thomas Michael, MD;  [place] [facility] [place]    [facility] EVENT MONITOR (CARDIAC - [place])  10/08    Normal/had palpatiations.     [facility] COLPOSCOPY VULVA W BIOPSY       Current Outpatient Medications   Medication Sig Dispense Refill    rivaroxaban ANTICOAGULANT (XARELTO ANTICOAGULANT) 10 MG TABS tablet Take 1 tablet (10 mg) by mouth daily (with dinner) TAKE 1 TABLET BY MOUTH EVERY DAY WITH DINNER 90 tablet 3    VITAMIN D, CHOLECALCIFEROL, PO Take 1,000 Units by mouth daily      BIOTIN PO Take by mouth daily  "(Patient not taking: Reported on 7/14/2023)      MAGNESIUM GLYCINATE PO  (Patient not taking: Reported on 7/14/2023)      propranolol (INDERAL) 10 MG tablet Take 1-2 tablets (10-20 mg) by mouth 3 times daily as needed (Anxiety) (Patient not taking: Reported on 8/4/2023) 60 tablet 3    STATIN NOT PRESCRIBED (INTENTIONAL) Please choose reason not prescribed from choices below. (Patient not taking: Reported on 8/4/2023)         Allergies   Allergen Reactions    Benadryl Allergy Other (See Comments)     Had paradoxical reaction - made her very hyper        Social History     Tobacco Use    Smoking status: Never    Smokeless tobacco: Never   Substance Use Topics    Alcohol use: Yes     Alcohol/week: 1.0 standard drink of alcohol     Types: 1 Standard drinks or equivalent per week     Comment: rare occ       History   Drug Use No         Objective     /75 (BP Location: Right arm, Patient Position: Sitting, Cuff Size: Adult Regular)   Pulse 87   Temp 97.5  F (36.4  C) (Temporal)   Resp 19   Ht 1.773 m (5' 9.8\")   Wt 86.2 kg (190 lb)   LMP 07/19/2023 (Exact Date)   SpO2 99%   BMI 27.42 kg/m      Physical Exam    GENERAL APPEARANCE: healthy, alert and no distress     EYES: EOMI, PERRL     HENT: ear canals and TM's normal and nose and mouth without ulcers or lesions     NECK: no adenopathy, no asymmetry, masses, or scars and thyroid normal to palpation     RESP: lungs clear to auscultation - no rales, rhonchi or wheezes     CV: regular rates and rhythm, normal S1 S2, no S3 or S4 and no murmur, click or rub     ABDOMEN:  soft, nontender, no HSM or masses and bowel sounds normal     MS: extremities normal- no gross deformities noted, no evidence of inflammation in joints, FROM in all extremities.     SKIN: no suspicious lesions or rashes     NEURO: Normal strength and tone, sensory exam grossly normal, mentation intact and speech normal     PSYCH: mentation appears normal. and affect normal/bright     LYMPHATICS: " No cervical adenopathy    Recent Labs   Lab Test 03/14/23  1651 01/02/23  1037 08/04/22  0818   HGB 14.8 14.5  --     275  --     140  --    POTASSIUM 4.1 4.3  --    CR 0.83 0.82  --    A1C  --  5.2 5.1        Diagnostics:  Labs pending at this time.  Results will be reviewed when available.   No EKG required, no history of coronary heart disease, significant arrhythmia, peripheral arterial disease or other structural heart disease.    Revised Cardiac Risk Index (RCRI):  The patient has the following serious cardiovascular risks for perioperative complications:   - No serious cardiac risks = 0 points     RCRI Interpretation: 0 points: Class I (very low risk - 0.4% complication rate)         Signed Electronically by: SWAPNA Merida CNP  Copy of this evaluation report is provided to requesting physician.

## 2023-08-15 ENCOUNTER — ANESTHESIA EVENT (OUTPATIENT)
Dept: SURGERY | Facility: CLINIC | Age: 48
End: 2023-08-15
Payer: COMMERCIAL

## 2023-08-15 RX ORDER — LORATADINE 10 MG/1
10 TABLET ORAL DAILY PRN
COMMUNITY

## 2023-08-15 RX ORDER — ACETAMINOPHEN 500 MG
500-1000 TABLET ORAL EVERY 6 HOURS PRN
COMMUNITY

## 2023-08-15 NOTE — ANESTHESIA PREPROCEDURE EVALUATION
Anesthesia Pre-Procedure Evaluation    Patient: Zayra Altamirano   MRN: [id number] : 1975        Procedure : Procedure(s):  bilateral skin sparing mastectomies  bilateral breast reconstruction with mastopexy technique          Past Medical History:   Diagnosis Date    Congenital deficiency of other clotting factors     Factor 5 Leiden homozygous mutation    DVT of leg (deep venous thrombosis) (H) 2005    Embolism and thrombosis of unspecified site     left leg that traveled to become PE    Galactorrhea not associated with childbirth     elevated prolactin, Nl head CT    Hemorrhage of gastrointestinal tract, unspecified 3/09 ER    on coumadin    History of gestational diabetes     Homozygous Factor V Leiden mutation (H)     Human papillomavirus in conditions classified elsewhere and of unspecified site     Other pulmonary embolism and infarction     6 MOS COUMADIN.  change goal INR 1.3-2.0    Rosacea       Past Surgical History:   Procedure Laterality Date    C/SECTION, LOW TRANSVERSE  2008    , Low Transverse     SECTION  2012    Procedure: SECTION; Surgeon:ELY KRAMER; Location:[facility] L[facility]D    COLONOSCOPY N/A 2022    Procedure: COLONOSCOPY, WITH POLYPECTOMY ;  Surgeon: Leventhal, Thomas Michael, MD;  Location: [facility] [place]    [facility] EVENT MONITOR ([place]IAC - [place])  10/08    Normal/had palpatiations.     [facility] COLPOSCOPY VULVA W BIOPSY        Allergies   Allergen Reactions    Benadryl Allergy Other (See Comments)     Had paradoxical reaction - made her very hyper      Social History     Tobacco Use    Smoking status: Never    Smokeless tobacco: Never   Substance Use Topics    Alcohol use: Yes     Alcohol/week: 1.0 standard drink of alcohol     Types: 1 Standard drinks or equivalent per week     Comment: rare occ      Wt Readings from Last 1 Encounters:   23 86.2 kg (190 lb)        Anesthesia Evaluation   Pt has had prior anesthetic.      History of anesthetic complications  - PONV.      ROS/MED HX  ENT/Pulmonary:  - neg pulmonary ROS     Neurologic:  - neg neurologic ROS     Cardiovascular:     (+) Dyslipidemia - -   -  - -   Taking blood thinners                                (-) CAD   METS/Exercise Tolerance: >4 METS    Hematologic: Comments: Factor V Leiden mutation  H/O PE    (+) History of blood clots,               Musculoskeletal:  - neg musculoskeletal ROS     GI/Hepatic:  - neg GI/hepatic ROS     Renal/Genitourinary:  - neg Renal ROS     Endo: Comment: GDM in past   (-) Type I DM, Type II DM and obesity   Psychiatric/Substance Use:  - neg psychiatric ROS   (+)  1       Infectious Disease:       Malignancy:       Other:            Physical Exam    Airway        Mallampati: II   TM distance: > 3 FB   Neck ROM: full   Mouth opening: > 3 cm    Respiratory Devices and Support         Dental     Comment: Some bonding behind front teeth.         Cardiovascular   cardiovascular exam normal       Rhythm and rate: regular     Pulmonary   pulmonary exam normal        breath sounds clear to auscultation           OUTSIDE LABS:  CBC:   Lab Results   Component Value Date    WBC 6.7 08/04/2023    WBC 10.4 03/14/2023    HGB 14.2 08/04/2023    HGB 14.8 03/14/2023    HCT 41.6 08/04/2023    HCT 44.8 03/14/2023     08/04/2023     03/14/2023     BMP:   Lab Results   Component Value Date     08/04/2023     03/14/2023    POTASSIUM 4.6 08/04/2023    POTASSIUM 4.1 03/14/2023    CHLORIDE 104 08/04/2023    CHLORIDE 104 03/14/2023    CO2 28 08/04/2023    CO2 25 03/14/2023    BUN 11.6 08/04/2023    BUN 14.9 03/14/2023    CR 0.79 08/04/2023    CR 0.83 03/14/2023    GLC 76 08/04/2023     (H) 03/14/2023     COAGS:   Lab Results   Component Value Date    PTT 30 07/26/2007    INR 2.4 (A) 01/22/2019     POC:   Lab Results   Component Value Date    HCG Positive (A) 09/20/2007     HEPATIC:   Lab Results   Component Value Date    ALBUMIN 4.3  01/02/2023    PROTTOTAL 6.7 01/02/2023    ALT 12 01/02/2023    AST 24 01/02/2023    ALKPHOS 54 01/02/2023    BILITOTAL 0.3 01/02/2023     OTHER:   Lab Results   Component Value Date    PH 7.45 12/13/2005    A1C 5.2 01/02/2023    BAUTISTA 9.5 08/04/2023    TSH 2.75 01/02/2023    T4 0.75 (L) 03/25/2022       Anesthesia Plan    ASA Status:  2    NPO Status:  NPO Appropriate    Anesthesia Type: General.     - Airway: LMA   Induction: Intravenous, Propofol.   Maintenance: TIVA.        Consents    Anesthesia Plan(s) and associated risks, benefits, and realistic alternatives discussed. Questions answered and patient/representative(s) expressed understanding.     - Discussed:     - Discussed with:  Patient      - Extended Intubation/Ventilatory Support Discussed: No.      - Patient is DNR/DNI Status: No     Use of blood products discussed: No .     Postoperative Care    Pain management: Peripheral nerve block (Single Shot), Multi-modal analgesia, IV analgesics.   PONV prophylaxis: Ondansetron (or other 5HT-3), Dexamethasone or Solumedrol, Aprepitant     Comments:    Other Comments: Patient is counseled on the anesthesia plan and relevant anesthesia procedures including all risks and benefits. All patient questions were answered.               Kunal White MD

## 2023-08-15 NOTE — PROGRESS NOTES
PTA medications updated by Medication Scribe prior to surgery via phone call with patient (last doses completed by Nurse)     Medication history sources: Patient, Surescripts, and H&P  In the past week, patient estimated taking medication this percent of the time: Greater than 90%      Significant changes made to the medication list:  None      Additional medication history information:   None    Medication reconciliation completed by provider prior to medication history? No    Time spent in this activity: 25 minutes    The information provided in this note is only as accurate as the sources available at the time of update(s)      Prior to Admission medications    Medication Sig Last Dose Taking? Auth Provider Long Term End Date   acetaminophen (TYLENOL) 500 MG tablet Take 500-1,000 mg by mouth every 6 hours as needed for mild pain Unknown at PRN Yes Reported, Patient     loratadine (CLARITIN) 10 MG tablet Take 10 mg by mouth daily as needed for allergies Unknown at PRN Yes Reported, Patient     rivaroxaban ANTICOAGULANT (XARELTO ANTICOAGULANT) 10 MG TABS tablet Take 1 tablet (10 mg) by mouth daily (with dinner) TAKE 1 TABLET BY MOUTH EVERY DAY WITH DINNER 8/13/2023 at PM Yes Miky Hanson PA-C Yes    VITAMIN D, CHOLECALCIFEROL, PO Take 1,000 Units by mouth daily 8/13/2023 at PM Yes Reported, Patient     STATIN NOT PRESCRIBED (INTENTIONAL) Please choose reason not prescribed from choices below.  Patient not taking: Reported on 8/4/2023   Allie Cotter, APRN CNP Yes

## 2023-08-16 ENCOUNTER — ANESTHESIA (OUTPATIENT)
Dept: SURGERY | Facility: CLINIC | Age: 48
End: 2023-08-16
Payer: COMMERCIAL

## 2023-08-16 ENCOUNTER — HOSPITAL ENCOUNTER (OUTPATIENT)
Facility: CLINIC | Age: 48
Discharge: HOME | End: 2023-08-17
Attending: SURGERY | Admitting: SURGERY
Payer: COMMERCIAL

## 2023-08-16 ENCOUNTER — SURGERY (OUTPATIENT)
Age: 48
End: 2023-08-16
Payer: COMMERCIAL

## 2023-08-16 ENCOUNTER — APPOINTMENT (OUTPATIENT)
Dept: SURGERY | Facility: PHYSICIAN GROUP | Age: 48
End: 2023-08-16
Payer: COMMERCIAL

## 2023-08-16 DIAGNOSIS — Z91.89 AT HIGH RISK FOR BREAST CANCER: ICD-10-CM

## 2023-08-16 LAB — HCG UR QL: NEGATIVE

## 2023-08-16 PROCEDURE — 250N000012 HC RX MED GY IP 250 OP 636 PS 637: Performed by: ANESTHESIOLOGY

## 2023-08-16 PROCEDURE — 999N000141 HC STATISTIC PRE-PROCEDURE NURSING ASSESSMENT: Performed by: SURGERY

## 2023-08-16 PROCEDURE — 710N000009 HC RECOVERY PHASE 1, LEVEL 1, PER MIN: Performed by: SURGERY

## 2023-08-16 PROCEDURE — 88307 TISSUE EXAM BY PATHOLOGIST: CPT | Mod: TC | Performed by: SURGERY

## 2023-08-16 PROCEDURE — 250N000011 HC RX IP 250 OP 636: Performed by: NURSE ANESTHETIST, CERTIFIED REGISTERED

## 2023-08-16 PROCEDURE — 250N000011 HC RX IP 250 OP 636: Mod: JZ | Performed by: ANESTHESIOLOGY

## 2023-08-16 PROCEDURE — 250N000011 HC RX IP 250 OP 636

## 2023-08-16 PROCEDURE — 258N000003 HC RX IP 258 OP 636: Performed by: NURSE ANESTHETIST, CERTIFIED REGISTERED

## 2023-08-16 PROCEDURE — 250N000009 HC RX 250: Performed by: SURGERY

## 2023-08-16 PROCEDURE — 250N000013 HC RX MED GY IP 250 OP 250 PS 637

## 2023-08-16 PROCEDURE — 360N000076 HC SURGERY LEVEL 3, PER MIN: Performed by: SURGERY

## 2023-08-16 PROCEDURE — 19303 MAST SIMPLE COMPLETE: CPT | Mod: 50 | Performed by: PHYSICIAN ASSISTANT

## 2023-08-16 PROCEDURE — 258N000003 HC RX IP 258 OP 636

## 2023-08-16 PROCEDURE — 19303 MAST SIMPLE COMPLETE: CPT | Mod: 50 | Performed by: SURGERY

## 2023-08-16 PROCEDURE — 250N000011 HC RX IP 250 OP 636: Mod: JZ

## 2023-08-16 PROCEDURE — 88307 TISSUE EXAM BY PATHOLOGIST: CPT | Mod: 26 | Performed by: PATHOLOGY

## 2023-08-16 PROCEDURE — 250N000013 HC RX MED GY IP 250 OP 250 PS 637: Performed by: SURGERY

## 2023-08-16 PROCEDURE — 250N000009 HC RX 250: Performed by: NURSE ANESTHETIST, CERTIFIED REGISTERED

## 2023-08-16 PROCEDURE — 370N000017 HC ANESTHESIA TECHNICAL FEE, PER MIN: Performed by: SURGERY

## 2023-08-16 PROCEDURE — 272N000001 HC OR GENERAL SUPPLY STERILE: Performed by: SURGERY

## 2023-08-16 PROCEDURE — 81025 URINE PREGNANCY TEST: CPT | Performed by: SURGERY

## 2023-08-16 RX ORDER — CEFAZOLIN SODIUM 2 G/100ML
2 INJECTION, SOLUTION INTRAVENOUS EVERY 8 HOURS
Status: COMPLETED | OUTPATIENT
Start: 2023-08-16 | End: 2023-08-17

## 2023-08-16 RX ORDER — LIDOCAINE HYDROCHLORIDE 20 MG/ML
INJECTION, SOLUTION INFILTRATION; PERINEURAL PRN
Status: DISCONTINUED | OUTPATIENT
Start: 2023-08-16 | End: 2023-08-16

## 2023-08-16 RX ORDER — METHOCARBAMOL 750 MG/1
750 TABLET, FILM COATED ORAL EVERY 6 HOURS PRN
Status: DISCONTINUED | OUTPATIENT
Start: 2023-08-16 | End: 2023-08-16

## 2023-08-16 RX ORDER — SODIUM CHLORIDE, SODIUM LACTATE, POTASSIUM CHLORIDE, CALCIUM CHLORIDE 600; 310; 30; 20 MG/100ML; MG/100ML; MG/100ML; MG/100ML
INJECTION, SOLUTION INTRAVENOUS CONTINUOUS PRN
Status: DISCONTINUED | OUTPATIENT
Start: 2023-08-16 | End: 2023-08-16

## 2023-08-16 RX ORDER — CEFAZOLIN SODIUM/WATER 2 G/20 ML
2 SYRINGE (ML) INTRAVENOUS
Status: COMPLETED | OUTPATIENT
Start: 2023-08-16 | End: 2023-08-16

## 2023-08-16 RX ORDER — FENTANYL CITRATE 0.05 MG/ML
25 INJECTION, SOLUTION INTRAMUSCULAR; INTRAVENOUS EVERY 5 MIN PRN
Status: DISCONTINUED | OUTPATIENT
Start: 2023-08-16 | End: 2023-08-16 | Stop reason: HOSPADM

## 2023-08-16 RX ORDER — FENTANYL CITRATE 50 UG/ML
INJECTION, SOLUTION INTRAMUSCULAR; INTRAVENOUS PRN
Status: DISCONTINUED | OUTPATIENT
Start: 2023-08-16 | End: 2023-08-16

## 2023-08-16 RX ORDER — FENTANYL CITRATE 0.05 MG/ML
50 INJECTION, SOLUTION INTRAMUSCULAR; INTRAVENOUS EVERY 5 MIN PRN
Status: DISCONTINUED | OUTPATIENT
Start: 2023-08-16 | End: 2023-08-16 | Stop reason: HOSPADM

## 2023-08-16 RX ORDER — PROPOFOL 10 MG/ML
INJECTION, EMULSION INTRAVENOUS PRN
Status: DISCONTINUED | OUTPATIENT
Start: 2023-08-16 | End: 2023-08-16

## 2023-08-16 RX ORDER — HYDROMORPHONE HCL IN WATER/PF 6 MG/30 ML
0.2 PATIENT CONTROLLED ANALGESIA SYRINGE INTRAVENOUS
Status: DISCONTINUED | OUTPATIENT
Start: 2023-08-16 | End: 2023-08-17 | Stop reason: HOSPADM

## 2023-08-16 RX ORDER — NALOXONE HYDROCHLORIDE 0.4 MG/ML
0.4 INJECTION, SOLUTION INTRAMUSCULAR; INTRAVENOUS; SUBCUTANEOUS
Status: DISCONTINUED | OUTPATIENT
Start: 2023-08-16 | End: 2023-08-17 | Stop reason: HOSPADM

## 2023-08-16 RX ORDER — ONDANSETRON 4 MG/1
4 TABLET, ORALLY DISINTEGRATING ORAL EVERY 30 MIN PRN
Status: DISCONTINUED | OUTPATIENT
Start: 2023-08-16 | End: 2023-08-16 | Stop reason: HOSPADM

## 2023-08-16 RX ORDER — METHOCARBAMOL 750 MG/1
750 TABLET, FILM COATED ORAL 4 TIMES DAILY
Status: DISCONTINUED | OUTPATIENT
Start: 2023-08-16 | End: 2023-08-17 | Stop reason: HOSPADM

## 2023-08-16 RX ORDER — ACETAMINOPHEN 325 MG/1
975 TABLET ORAL EVERY 8 HOURS
Status: DISCONTINUED | OUTPATIENT
Start: 2023-08-16 | End: 2023-08-17 | Stop reason: HOSPADM

## 2023-08-16 RX ORDER — AMOXICILLIN 250 MG
1 CAPSULE ORAL 2 TIMES DAILY
Status: DISCONTINUED | OUTPATIENT
Start: 2023-08-16 | End: 2023-08-17 | Stop reason: HOSPADM

## 2023-08-16 RX ORDER — ONDANSETRON 4 MG/1
4 TABLET, ORALLY DISINTEGRATING ORAL EVERY 6 HOURS PRN
Status: DISCONTINUED | OUTPATIENT
Start: 2023-08-16 | End: 2023-08-17 | Stop reason: HOSPADM

## 2023-08-16 RX ORDER — BISACODYL 10 MG
10 SUPPOSITORY, RECTAL RECTAL DAILY PRN
Status: DISCONTINUED | OUTPATIENT
Start: 2023-08-16 | End: 2023-08-17 | Stop reason: HOSPADM

## 2023-08-16 RX ORDER — DEXAMETHASONE SODIUM PHOSPHATE 4 MG/ML
INJECTION, SOLUTION INTRA-ARTICULAR; INTRALESIONAL; INTRAMUSCULAR; INTRAVENOUS; SOFT TISSUE PRN
Status: DISCONTINUED | OUTPATIENT
Start: 2023-08-16 | End: 2023-08-16

## 2023-08-16 RX ORDER — IBUPROFEN 600 MG/1
600 TABLET, FILM COATED ORAL EVERY 6 HOURS PRN
Status: DISCONTINUED | OUTPATIENT
Start: 2023-08-16 | End: 2023-08-17 | Stop reason: HOSPADM

## 2023-08-16 RX ORDER — ONDANSETRON 2 MG/ML
4 INJECTION INTRAMUSCULAR; INTRAVENOUS EVERY 6 HOURS PRN
Status: DISCONTINUED | OUTPATIENT
Start: 2023-08-16 | End: 2023-08-17 | Stop reason: HOSPADM

## 2023-08-16 RX ORDER — HYDROMORPHONE HCL IN WATER/PF 6 MG/30 ML
0.4 PATIENT CONTROLLED ANALGESIA SYRINGE INTRAVENOUS
Status: DISCONTINUED | OUTPATIENT
Start: 2023-08-16 | End: 2023-08-17 | Stop reason: HOSPADM

## 2023-08-16 RX ORDER — OXYCODONE HYDROCHLORIDE 5 MG/1
10 TABLET ORAL EVERY 4 HOURS PRN
Status: DISCONTINUED | OUTPATIENT
Start: 2023-08-16 | End: 2023-08-17 | Stop reason: HOSPADM

## 2023-08-16 RX ORDER — LIDOCAINE 40 MG/G
CREAM TOPICAL
Status: DISCONTINUED | OUTPATIENT
Start: 2023-08-16 | End: 2023-08-17 | Stop reason: HOSPADM

## 2023-08-16 RX ORDER — CEFAZOLIN SODIUM/WATER 2 G/20 ML
2 SYRINGE (ML) INTRAVENOUS
Status: DISCONTINUED | OUTPATIENT
Start: 2023-08-16 | End: 2023-08-16 | Stop reason: HOSPADM

## 2023-08-16 RX ORDER — PROPOFOL 10 MG/ML
INJECTION, EMULSION INTRAVENOUS CONTINUOUS PRN
Status: DISCONTINUED | OUTPATIENT
Start: 2023-08-16 | End: 2023-08-16

## 2023-08-16 RX ORDER — CEFAZOLIN SODIUM/WATER 2 G/20 ML
2 SYRINGE (ML) INTRAVENOUS SEE ADMIN INSTRUCTIONS
Status: DISCONTINUED | OUTPATIENT
Start: 2023-08-16 | End: 2023-08-16 | Stop reason: HOSPADM

## 2023-08-16 RX ORDER — BUPIVACAINE HYDROCHLORIDE 2.5 MG/ML
INJECTION, SOLUTION EPIDURAL; INFILTRATION; INTRACAUDAL
Status: COMPLETED | OUTPATIENT
Start: 2023-08-16 | End: 2023-08-16

## 2023-08-16 RX ORDER — POLYETHYLENE GLYCOL 3350 17 G/17G
17 POWDER, FOR SOLUTION ORAL DAILY
Status: DISCONTINUED | OUTPATIENT
Start: 2023-08-17 | End: 2023-08-17 | Stop reason: HOSPADM

## 2023-08-16 RX ORDER — MAGNESIUM HYDROXIDE 1200 MG/15ML
LIQUID ORAL PRN
Status: DISCONTINUED | OUTPATIENT
Start: 2023-08-16 | End: 2023-08-16 | Stop reason: HOSPADM

## 2023-08-16 RX ORDER — METHOCARBAMOL 500 MG/1
500 TABLET, FILM COATED ORAL 4 TIMES DAILY
Status: CANCELLED | OUTPATIENT
Start: 2023-08-16

## 2023-08-16 RX ORDER — ONDANSETRON 2 MG/ML
INJECTION INTRAMUSCULAR; INTRAVENOUS PRN
Status: DISCONTINUED | OUTPATIENT
Start: 2023-08-16 | End: 2023-08-16

## 2023-08-16 RX ORDER — DEXTROSE MONOHYDRATE, SODIUM CHLORIDE, AND POTASSIUM CHLORIDE 50; 1.49; 4.5 G/1000ML; G/1000ML; G/1000ML
INJECTION, SOLUTION INTRAVENOUS CONTINUOUS
Status: DISCONTINUED | OUTPATIENT
Start: 2023-08-16 | End: 2023-08-16

## 2023-08-16 RX ORDER — HYDROMORPHONE HCL IN WATER/PF 6 MG/30 ML
0.2 PATIENT CONTROLLED ANALGESIA SYRINGE INTRAVENOUS EVERY 5 MIN PRN
Status: DISCONTINUED | OUTPATIENT
Start: 2023-08-16 | End: 2023-08-16 | Stop reason: HOSPADM

## 2023-08-16 RX ORDER — SODIUM CHLORIDE, SODIUM LACTATE, POTASSIUM CHLORIDE, CALCIUM CHLORIDE 600; 310; 30; 20 MG/100ML; MG/100ML; MG/100ML; MG/100ML
INJECTION, SOLUTION INTRAVENOUS CONTINUOUS
Status: DISCONTINUED | OUTPATIENT
Start: 2023-08-16 | End: 2023-08-16 | Stop reason: HOSPADM

## 2023-08-16 RX ORDER — APREPITANT 40 MG/1
40 CAPSULE ORAL ONCE
Status: COMPLETED | OUTPATIENT
Start: 2023-08-16 | End: 2023-08-16

## 2023-08-16 RX ORDER — NALOXONE HYDROCHLORIDE 0.4 MG/ML
0.2 INJECTION, SOLUTION INTRAMUSCULAR; INTRAVENOUS; SUBCUTANEOUS
Status: DISCONTINUED | OUTPATIENT
Start: 2023-08-16 | End: 2023-08-17 | Stop reason: HOSPADM

## 2023-08-16 RX ORDER — ACETAMINOPHEN 325 MG/1
650 TABLET ORAL EVERY 4 HOURS PRN
Status: DISCONTINUED | OUTPATIENT
Start: 2023-08-19 | End: 2023-08-17 | Stop reason: HOSPADM

## 2023-08-16 RX ORDER — ACETAMINOPHEN 650 MG/1
650 SUPPOSITORY RECTAL EVERY 4 HOURS
Status: CANCELLED | OUTPATIENT
Start: 2023-08-16

## 2023-08-16 RX ORDER — ACETAMINOPHEN 325 MG/1
650 TABLET ORAL EVERY 4 HOURS
Status: CANCELLED | OUTPATIENT
Start: 2023-08-16

## 2023-08-16 RX ORDER — PROCHLORPERAZINE MALEATE 10 MG
10 TABLET ORAL EVERY 6 HOURS PRN
Status: DISCONTINUED | OUTPATIENT
Start: 2023-08-16 | End: 2023-08-17 | Stop reason: HOSPADM

## 2023-08-16 RX ORDER — ONDANSETRON 2 MG/ML
4 INJECTION INTRAMUSCULAR; INTRAVENOUS EVERY 30 MIN PRN
Status: DISCONTINUED | OUTPATIENT
Start: 2023-08-16 | End: 2023-08-16 | Stop reason: HOSPADM

## 2023-08-16 RX ORDER — HYDROMORPHONE HCL IN WATER/PF 6 MG/30 ML
0.4 PATIENT CONTROLLED ANALGESIA SYRINGE INTRAVENOUS EVERY 5 MIN PRN
Status: DISCONTINUED | OUTPATIENT
Start: 2023-08-16 | End: 2023-08-16 | Stop reason: HOSPADM

## 2023-08-16 RX ORDER — OXYCODONE HYDROCHLORIDE 5 MG/1
5 TABLET ORAL EVERY 4 HOURS PRN
Status: DISCONTINUED | OUTPATIENT
Start: 2023-08-16 | End: 2023-08-17 | Stop reason: HOSPADM

## 2023-08-16 RX ORDER — SENNOSIDES 8.6 MG
8.6 TABLET ORAL 2 TIMES DAILY PRN
Status: CANCELLED | OUTPATIENT
Start: 2023-08-16

## 2023-08-16 RX ADMIN — ONDANSETRON 4 MG: 2 INJECTION INTRAMUSCULAR; INTRAVENOUS at 10:19

## 2023-08-16 RX ADMIN — ACETAMINOPHEN 975 MG: 325 TABLET, FILM COATED ORAL at 21:14

## 2023-08-16 RX ADMIN — SENNOSIDES AND DOCUSATE SODIUM 1 TABLET: 50; 8.6 TABLET ORAL at 21:14

## 2023-08-16 RX ADMIN — APREPITANT 40 MG: 40 CAPSULE ORAL at 06:53

## 2023-08-16 RX ADMIN — HYDROMORPHONE HYDROCHLORIDE 0.2 MG: 0.2 INJECTION, SOLUTION INTRAMUSCULAR; INTRAVENOUS; SUBCUTANEOUS at 12:02

## 2023-08-16 RX ADMIN — POTASSIUM CHLORIDE, DEXTROSE MONOHYDRATE AND SODIUM CHLORIDE: 150; 5; 450 INJECTION, SOLUTION INTRAVENOUS at 14:13

## 2023-08-16 RX ADMIN — SODIUM CHLORIDE 3000 ML: 900 IRRIGANT IRRIGATION at 08:47

## 2023-08-16 RX ADMIN — CEFAZOLIN SODIUM 2 G: 2 INJECTION, SOLUTION INTRAVENOUS at 14:30

## 2023-08-16 RX ADMIN — METHOCARBAMOL 750 MG: 750 TABLET ORAL at 21:14

## 2023-08-16 RX ADMIN — SODIUM CHLORIDE, POTASSIUM CHLORIDE, SODIUM LACTATE AND CALCIUM CHLORIDE: 600; 310; 30; 20 INJECTION, SOLUTION INTRAVENOUS at 07:41

## 2023-08-16 RX ADMIN — HYDROMORPHONE HYDROCHLORIDE 0.2 MG: 0.2 INJECTION, SOLUTION INTRAMUSCULAR; INTRAVENOUS; SUBCUTANEOUS at 12:20

## 2023-08-16 RX ADMIN — PROPOFOL 200 MG: 10 INJECTION, EMULSION INTRAVENOUS at 07:45

## 2023-08-16 RX ADMIN — PROPOFOL 50 MG: 10 INJECTION, EMULSION INTRAVENOUS at 08:06

## 2023-08-16 RX ADMIN — PROPOFOL 50 MG: 10 INJECTION, EMULSION INTRAVENOUS at 08:44

## 2023-08-16 RX ADMIN — BUPIVACAINE HYDROCHLORIDE 60 ML: 2.5 INJECTION, SOLUTION EPIDURAL; INFILTRATION; INTRACAUDAL; PERINEURAL at 07:45

## 2023-08-16 RX ADMIN — FENTANYL CITRATE 50 MCG: 50 INJECTION, SOLUTION INTRAMUSCULAR; INTRAVENOUS at 08:04

## 2023-08-16 RX ADMIN — MIDAZOLAM 2 MG: 1 INJECTION INTRAMUSCULAR; INTRAVENOUS at 07:41

## 2023-08-16 RX ADMIN — CEFAZOLIN SODIUM 2 G: 2 INJECTION, SOLUTION INTRAVENOUS at 23:44

## 2023-08-16 RX ADMIN — DEXAMETHASONE SODIUM PHOSPHATE 8 MG: 4 INJECTION, SOLUTION INTRA-ARTICULAR; INTRALESIONAL; INTRAMUSCULAR; INTRAVENOUS; SOFT TISSUE at 07:55

## 2023-08-16 RX ADMIN — FENTANYL CITRATE 50 MCG: 50 INJECTION, SOLUTION INTRAMUSCULAR; INTRAVENOUS at 08:44

## 2023-08-16 RX ADMIN — ACETAMINOPHEN 975 MG: 325 TABLET, FILM COATED ORAL at 14:13

## 2023-08-16 RX ADMIN — METHOCARBAMOL 750 MG: 750 TABLET ORAL at 18:22

## 2023-08-16 RX ADMIN — FENTANYL CITRATE 25 MCG: 50 INJECTION, SOLUTION INTRAMUSCULAR; INTRAVENOUS at 11:37

## 2023-08-16 RX ADMIN — PROPOFOL 175 MCG/KG/MIN: 10 INJECTION, EMULSION INTRAVENOUS at 07:45

## 2023-08-16 RX ADMIN — LIDOCAINE HYDROCHLORIDE 100 MG: 20 INJECTION, SOLUTION INFILTRATION; PERINEURAL at 07:45

## 2023-08-16 RX ADMIN — Medication 2 G: at 07:41

## 2023-08-16 RX ADMIN — ONDANSETRON 4 MG: 2 INJECTION INTRAMUSCULAR; INTRAVENOUS at 11:55

## 2023-08-16 RX ADMIN — OXYCODONE HYDROCHLORIDE 5 MG: 5 TABLET ORAL at 16:00

## 2023-08-16 RX ADMIN — FENTANYL CITRATE 25 MCG: 50 INJECTION, SOLUTION INTRAMUSCULAR; INTRAVENOUS at 11:30

## 2023-08-16 ASSESSMENT — ACTIVITIES OF DAILY LIVING (ADL)
ADLS_ACUITY_SCORE: 22
ADLS_ACUITY_SCORE: 20
ADLS_ACUITY_SCORE: 22
ADLS_ACUITY_SCORE: 22
ADLS_ACUITY_SCORE: 20
ADLS_ACUITY_SCORE: 22
ADLS_ACUITY_SCORE: 20

## 2023-08-16 NOTE — ANESTHESIA CARE TRANSFER NOTE
Patient: Zayra Altamirano    Procedure: Procedure(s):  bilateral skin sparing mastectomies  bilateral breast reconstruction with mastopexy technique       Diagnosis: At high risk for breast cancer [Z91.89]  Diagnosis Additional Information: No value filed.    Anesthesia Type:   General     Note:    Oropharynx: oropharynx clear of all foreign objects and spontaneously breathing  Level of Consciousness: drowsy  Oxygen Supplementation: face mask  Level of Supplemental Oxygen (L/min / FiO2): 6  Independent Airway: airway patency satisfactory and stable    Vital Signs Stable: post-procedure vital signs reviewed and stable  Report to RN Given: handoff report given  Patient transferred to: PACU  Comments: At end of procedure, spontaneous respirations, adequate tidal volumes, followed commands to voice, LMA removed atraumatically, oropharynx suctioned, airway patent after LMA removal. Oxygen via facemask at 6 liters per minute to PACU. Oxygen tubing connected to wall O2 in PACU, SpO2, NiBP, and EKG monitors and alarms on and functioning, Jennifer Hugger warmer connected to patient gown, report on patient's clinical status given to PACU RN, RN questions answered.      Handoff Report: Identifed the Patient, Identified the Reponsible Provider, Reviewed the pertinent medical history, Discussed the surgical course, Reviewed Intra-OP anesthesia mangement and issues during anesthesia, Set expectations for post-procedure period and Allowed opportunity for questions and acknowledgement of understanding      Vitals:  Vitals Value Taken Time   /67 08/16/23 1059   Temp     Pulse 95 08/16/23 1101   Resp 18 08/16/23 1101   SpO2 99 % 08/16/23 1101   Vitals shown include unvalidated device data.    Electronically Signed By: SWAPNA Stallworth CRNA  August 16, 2023  11:02 AM

## 2023-08-16 NOTE — ANESTHESIA PROCEDURE NOTES
Pectoralis Procedure Note    Pre-Procedure   Staff -        Anesthesiologist:  Kunal White MD       Performed By: anesthesiologist       Location: OR       Pre-Anesthestic Checklist: patient identified, IV checked, site marked, risks and benefits discussed, informed consent, monitors and equipment checked, pre-op evaluation, at physician/surgeon's request and post-op pain management  Timeout:       Correct Patient: Yes        Correct Procedure: Yes        Correct Site: Yes        Correct Position: Yes        Correct Laterality: Yes        Site Marked: Yes  Procedure Documentation  Procedure: Pectoralis             Pectoralis II and Pectoralis I       Laterality: bilateral       Patient Position: supine       Skin prep: Chloraprep       Needle Gauge: 21.        Needle Length (Inches): 4        Ultrasound guided       1. Ultrasound was used to identify targeted nerve, plexus, vascular marker, or fascial plane and place a needle adjacent to it in real-time.       2. Ultrasound was used to visualize the spread of anesthetic in close proximity to the above referenced structure.       3. A permanent image is entered into the patient's record.    Assessment/Narrative         The placement was negative for: blood aspirated, painful injection and site bleeding       Paresthesias: No.       Insertion/Infusion Method: Single Shot       Complications: none       Injection made incrementally with aspirations every 5 mL.    Medication(s) Administered   Bupivacaine 0.25% PF (Infiltration) - Infiltration   60 mL - 8/16/2023 7:45:00 AM   Comments:  Ultrasound Interpretation, peripheral nerve block.  Bupivicaine 0.25% 1:400k 20 ml injected in plane above 4th rib, needle pulled back and 10cc injected between pectoral muscles. 30 ml total per side.    1. Ultrasound guidance was used to evaluate potential access sites.  2. Ultrasound was also used to verify the block target site.   3. Ultrasound was used to visualize the  "needle near the nerve(s) or in the correct fascial plain.   4. The visualized structures were anatomically normal.  5. There were no apparent abnormal pathological findings.  6. A permanent ultrasound image was saved in the patient's record.         FOR [facility] ([facility] [place]) ONLY:   Pain Team Contact information: please page the Pain Team Via [employer]. Search \"Pain\". During daytime hours, please page the attending first. At night please page the resident first.      "

## 2023-08-16 NOTE — OP NOTE
[facility] Breast Surgery Operative Note    PREOPERATIVE DIAGNOSIS:  High risk breast cancer    POSTOPERATIVE DIAGNOSIS:  same, pathology pending    PROCEDURE:    1.  Bilateral skin sparing prophylactic mastectomies  2. Reconstruction per Dr. Cano, please see their operative report for details regarding their portion of the procedure.     SURGEON:  Shayy Kebede MD    ASSISTANT:  Alvin Marrero PA-C  The physician s assistant was medically necessary for their expertise in retraction and suctioning.    ANESTHESIA:  General.    BLOOD LOSS: 40ml    FINDINGS: see operative note for details    INDICATIONS:   Zayra is a 48yof who presents with a lifetime risk of 22.5% for breast cancer. She also has history of factor V Leiden and is on Xarelto. She has held this for two days. She elected to proceed with risk reducing mastectomies.     DETAILS OF PROCEDURE:     The patient was taken to the operating room and placed in supine position and general anesthesia by endotracheal tube.   Perioperative antiobiotics were given. SCDs were placed on the lower extremities.  Bilateral PECS blocks were performed by anesthesia.    The patient was also marked by Dr. Herrera with reduction pattern  incisions.     The breasts and axilla were prepped with chloraprep and draped in the usual sterile fashion.  The timeout procedure was performed.      Starting on the right side, a reduction pattern incision was made following the pre operative markings with a #10 scalpel blade and deepened with electrocautery.  The superior flap was raised with electrocautery up to the top edge of the breast tissue just under the clavicle.  The inferior flap was similarly raised using the cautery down to the inframammary fold.  Flaps were raised medially to the lateral aspect of the sternum and laterally to the lateral chest wall. I was able to identify and protect the lateral neurovascular bundles.  The breast tissue was then elevated off of the  pectoralis fascia with cautery.  Once the breast was removed, it was oriented with sutures with a short suture superior and long suture lateral.  The breast was sent to pathology to be placed in formalin and have routine pathology exam.  The right side was irrigated, hemostasis assured and then packed with a saline-wetted lap pad and covered with a dry towel while attention was turned to the left breast.     A similar  incision was made on the left side with a #10 scalpel blade and deepened with electrocautery.  Again, the flaps were raised with electrocautery and the breast was dissected away from the pectoralis fascia.  Again the lateral neurovascular bundles were identified and protected. The tissue was oriented with a short suture superior and long suture lateral.  The breast was then sent to pathology and placed in formalin for permanent section.  The left chest was irrigated and hemostasis assured and then packed with a moist laparotomy pad.       Dr. Cano then performed their portion of the procedure with reconstruction. Please see their operative report for details.     Shayy Kebede MD

## 2023-08-16 NOTE — PROGRESS NOTES
POST OPERATIVE NOTE-IMMEDIATE :    Preoperative Diagnosis:  At high risk for breast cancer [Z91.89]    Postoperative Diagnosis:  Same    Procedures:  Procedure(s):  bilateral skin sparing mastectomies  bilateral breast reconstruction with mastopexy technique    Surgeon(s) and Assistants (if any):  Surgeon(s):  Ovidio Cano MD Wildenberg, Sara, MD Craig, Alvin Curran, DOMINIQUE Leyva, Nikole MCMANUS PA-C    EBL:  50 mL    Anesthesia:  General with Block    Complications: None    Specimen:  ID Type Source Tests Collected by Time Destination   1 : RIGHT BREAST TISSUE, SHORT SUPERIOR LONG LATERAL Tissue Breast, Right SURGICAL PATHOLOGY EXAM Shayy Kebede MD 8/16/2023  8:40 AM    2 : LEFT BREAST TISSUE, SHORT SUPERIOR, LONG LATERAL Tissue Breast, Left SURGICAL PATHOLOGY EXAM Shayy Kebede MD 8/16/2023  9:23 AM        Findings:  Above    Condition on discharge from OR:  Satisfactory    Nikole Leyva PA-C

## 2023-08-16 NOTE — OP NOTE
Procedure Date: 08/16/2023    SURGEON: Ovidio Cano MD.    ASSISTANT:  Nikole Leyva PA-C.    PREOPERATIVE DIAGNOSES:    1.  Genetic risk of breast cancer.  2.  Acquired absence of bilateral breasts.      POSTOPERATIVE DIAGNOSES:    1.  Genetic risk of breast cancer.  2.  Acquired absence of bilateral breasts.      PROCEDURE:  Bilateral breast reconstruction using mastopexy technique.    TECHNIQUE:  The patient was marked preoperatively.  We discussed the expected incision placement, wound care, discomfort, time off work and activity restrictions.  Her questions were answered.  She was comfortable with the plan.  She was supine on the operating table, having undergone bilateral nipple-sparing mastectomies by Dr. Kebede.  A timeout was done.  I began on the left side.  The tissues were healthy and viable and satisfactory for reconstruction.  I deepithelialized the lower pole of the mastectomy flaps then mobilized medial and lateral wings of tissue, based on the inframammary perforators and dermal and subdermal cutaneous flow.  These flaps were then interdigitated and brought superiorly and shaped by affixing them to the chest wall with 2-0 Vicryl in multiple locations to achieve as much upper pole fullness as possible and, also, to allow for lower pole projection.  The wound was irrigated, and final hemostasis was achieved with electrocautery.  A drain was placed and sewn in with 2-0 silk.  The inverted-T incision was closed with 3-0 and 4-0 Vicryl.  I turned my attention to the right side.  The same technique was used to deepithelialize the lower pole skin, mobilize medial and lateral extensions of these flaps then fold these centrally to achieve the same shape as the opposite breast.  The same treatment was done with irrigation, hemostasis and closure of the inverted-T incisions.  There was satisfactory symmetry and tissue viability at the termination of the procedure.  Sterile dressings were applied.   Anesthesia was reversed.  The patient was taken to the recovery room in satisfactory condition.    ESTIMATED BLOOD LOSS:  10 mL.    COUNTS:  Sponge and needle counts were correct.    SPECIMENS:  None.    FINDINGS:  Noted above.    Ovidio Cano MD        D: 2023   T: 2023   MT: [email or web address]    Name:     ADRIANNE BLOCK  MRN:      [id number]        Account:        [id number]   :      1975           Procedure Date: 2023     Document: [id number]

## 2023-08-16 NOTE — PLAN OF CARE
Date & Time: 08/16/23 2725-5878    Surgery/POD#: POD 0 bilateral mastectomies & reconstruction  Behavior & Aggression: Green  Fall Risk: No  Orientation:A&O x4  ABNL VS/O2: VSS on RA  ABNL Labs: N/A  Pain Management:Tylenol, Robaxin, Oxy x1, ice packs  Bowel/Bladder: Continent, voiding adequately. Hypoactive BS  Drains: KAITLIN drain x2 w/ mod bloody red output. PIV SL.  Diet: Regular  Activity Level: SBA  Tests/Procedures: N/A  Anticipated  DC Date: Tomorrow 8/17  Significant Information: N/A

## 2023-08-16 NOTE — ANESTHESIA POSTPROCEDURE EVALUATION
Patient: Zayra Altamirano    Procedure: Procedure(s):  bilateral skin sparing mastectomies  bilateral breast reconstruction with mastopexy technique       Anesthesia Type:  General    Note:  Disposition: Outpatient   Postop Pain Control: Uneventful            Sign Out: Well controlled pain   PONV:    Neuro/Psych: Uneventful            Sign Out: Acceptable/Baseline neuro status   Airway/Respiratory: Uneventful            Sign Out: Acceptable/Baseline resp. status   CV/Hemodynamics: Uneventful            Sign Out: Acceptable CV status   Other NRE: NONE   DID A NON-ROUTINE EVENT OCCUR? No           Last vitals:  Vitals Value Taken Time   /75 08/16/23 1215   Temp 36.1  C (97  F) 08/16/23 1145   Pulse 87 08/16/23 1222   Resp 0 08/16/23 1222   SpO2 100 % 08/16/23 1222   Vitals shown include unvalidated device data.    Electronically Signed By: Kunal White MD  August 16, 2023  12:23 PM

## 2023-08-16 NOTE — INTERVAL H&P NOTE
"I have reviewed the surgical (or preoperative) H&P that is linked to this encounter, and examined the patient. There are no significant changes    Clinical Conditions Present on Arrival:  Clinically Significant Risk Factors Present on Admission                # Drug Induced Coagulation Defect: home medication list includes an anticoagulant medication   # Overweight: Estimated body mass index is 26.9 kg/m  as calculated from the following:    Height as of this encounter: 1.778 m (5' 10\").    Weight as of this encounter: 85 kg (187 lb 8 oz).       "

## 2023-08-16 NOTE — ANESTHESIA PROCEDURE NOTES
Airway       Patient location during procedure: OR ([facility] - Operating Room or Procedural Area)  Staff -        Anesthesiologist:  Kunal White MD       CRNA: Eulalia Razo APRN CRNA       Performed By: CRNA  Consent for Airway        Urgency: elective  Indications and Patient Condition       Indications for airway management: mahesh-procedural       Induction type:intravenous       Mask difficulty assessment: 0 - not attempted    Final Airway Details       Final airway type: supraglottic airway    Supraglottic Airway Details        Type: LMA       Brand: I-Gel       LMA size: 4    Post intubation assessment        Number of attempts at approach: 1       Number of other approaches attempted: 0       Ease of procedure: easy       Dentition: Intact and Unchanged

## 2023-08-17 VITALS
OXYGEN SATURATION: 97 % | WEIGHT: 187.5 LBS | RESPIRATION RATE: 16 BRPM | HEIGHT: 70 IN | SYSTOLIC BLOOD PRESSURE: 121 MMHG | TEMPERATURE: 98.7 F | BODY MASS INDEX: 26.84 KG/M2 | DIASTOLIC BLOOD PRESSURE: 72 MMHG | HEART RATE: 71 BPM

## 2023-08-17 LAB — GLUCOSE BLDC GLUCOMTR-MCNC: 133 MG/DL (ref 70–99)

## 2023-08-17 PROCEDURE — 250N000013 HC RX MED GY IP 250 OP 250 PS 637: Performed by: SURGERY

## 2023-08-17 PROCEDURE — 82962 GLUCOSE BLOOD TEST: CPT

## 2023-08-17 PROCEDURE — 250N000013 HC RX MED GY IP 250 OP 250 PS 637

## 2023-08-17 RX ORDER — METHOCARBAMOL 750 MG/1
750 TABLET, FILM COATED ORAL EVERY 6 HOURS PRN
Qty: 30 TABLET | Refills: 0 | Status: SHIPPED | OUTPATIENT
Start: 2023-08-17 | End: 2023-10-03

## 2023-08-17 RX ORDER — OXYCODONE HYDROCHLORIDE 5 MG/1
5 TABLET ORAL EVERY 6 HOURS PRN
Qty: 20 TABLET | Refills: 0 | Status: SHIPPED | OUTPATIENT
Start: 2023-08-17 | End: 2023-10-03

## 2023-08-17 RX ADMIN — OXYCODONE HYDROCHLORIDE 5 MG: 5 TABLET ORAL at 01:56

## 2023-08-17 RX ADMIN — POLYETHYLENE GLYCOL 3350 17 G: 17 POWDER, FOR SOLUTION ORAL at 08:41

## 2023-08-17 RX ADMIN — ACETAMINOPHEN 975 MG: 325 TABLET, FILM COATED ORAL at 05:28

## 2023-08-17 RX ADMIN — SENNOSIDES AND DOCUSATE SODIUM 1 TABLET: 50; 8.6 TABLET ORAL at 08:41

## 2023-08-17 RX ADMIN — METHOCARBAMOL 750 MG: 750 TABLET ORAL at 08:41

## 2023-08-17 ASSESSMENT — ACTIVITIES OF DAILY LIVING (ADL)
ADLS_ACUITY_SCORE: 22

## 2023-08-17 NOTE — DISCHARGE SUMMARY
DISCHARGE SUMMARY    PRINCIPAL DISCHARGE DIAGNOSIS: At risk for breast cancer     Operative procedures: Bilateral mastectomy/mastopexy reconstruction    REASON FOR ADMISSION: At risk for breast cancer    HOSPITAL COURSE: The patient was taken to the operating room on her day of admission and tolerated the procedure well. Diet and activity were gradually advanced and she was discharged on POD# 1.     DISCHARGE INSTRUCTIONS:   Diet: Regular   Activity: No heavy strenuous physical activity, 15 lb lifting limit  Wound/Drain Care: Instructions given       Review of your medicines        START taking        Dose / Directions   methocarbamol 750 MG tablet  Commonly known as: ROBAXIN      Dose: 750 mg  Take 1 tablet (750 mg) by mouth every 6 hours as needed for muscle spasms  Quantity: 30 tablet  Refills: 0     oxyCODONE 5 MG tablet  Commonly known as: ROXICODONE      Dose: 5 mg  Take 1 tablet (5 mg) by mouth every 6 hours as needed for pain  Quantity: 20 tablet  Refills: 0            CONTINUE these medicines which have NOT CHANGED        Dose / Directions   acetaminophen 500 MG tablet  Commonly known as: TYLENOL      Dose: 500-1,000 mg  Take 500-1,000 mg by mouth every 6 hours as needed for mild pain  Refills: 0     loratadine 10 MG tablet  Commonly known as: CLARITIN      Dose: 10 mg  Take 10 mg by mouth daily as needed for allergies  Refills: 0     rivaroxaban ANTICOAGULANT 10 MG Tabs tablet  Commonly known as: XARELTO ANTICOAGULANT  Used for: History of venous thromboembolism, Chronic anticoagulation      Dose: 10 mg  Take 1 tablet (10 mg) by mouth daily (with dinner) TAKE 1 TABLET BY MOUTH EVERY DAY WITH DINNER  Quantity: 90 tablet  Refills: 3     STATIN NOT PRESCRIBED  Commonly known as: INTENTIONAL      Please choose reason not prescribed from choices below.  Refills: 0     VITAMIN D (CHOLECALCIFEROL) PO  Indication: Fall through Spring      Dose: 1,000 Units  Take 1,000 Units by mouth daily  Refills: 0                Where to get your medicines        These medications were sent to [facility] [place], [place] - [address]  [address], [place] [address]      Phone: [phone]   methocarbamol 750 MG tablet  oxyCODONE 5 MG tablet           Condition at Discharge: Wounds clean and dry, no sign of infection or hematoma. Normal/stable vitals signs. Pt ambulatory and able to take a regular diet well. Tissues viable. Comfortable on oral meds.     Nikole Leyva PA-C on 8/17/2023 at 6:58 AM

## 2023-08-17 NOTE — PROGRESS NOTES
PLASTIC SURGERY PROGRESS NOTE    POD # 1    SUBJECTIVE: Doing well. VSS overnight, no nausea. Pain controlled with PRN medications. Eating, drinking, ambulating, voiding well.     OBJECTIVE:  GENERAL: Awake, alert, oriented.  FLAP: Incisions clean, dry. No hematoma. Flaps are warm, soft, good capillary refill. Minimal ecchymosis at lower poles as is typical. Drain sites healthy, drain output sanguineous on left, serosanguineous on right, not excessive. Appropriately tender.    PLAN: Home today. Fully instructed in post-operative cares.       Nikole Leyva PA-C  Plastic and Reconstructive Surgery  [facility]  Pager: [phone]  On weekends please page on-call surgeon

## 2023-08-17 NOTE — PROGRESS NOTES
General Surgery   Brief Note, full note to follow  Pt seen this am.  Doing very well.  No concerns for discharge today   OK to discharge home from surgical standpoint  F/U next week with Plastics  F/U in ~3 wks with Dr. Mariscal  Plan to start anticoagulation back up tonight.  Discussed monitoring drain carefully.

## 2023-08-17 NOTE — PLAN OF CARE
Date & Time: 1900-0700  Surgery/POD#: POD 1 from bilat mastectomies and reconstruction  Behavior & Aggression: Green  Fall Risk: No  Orientation: A&Ox4  ABNL VS/O2: VSS on RA  Pain Management: Managed w/ scheduled pain meds and oxy x1  Bowel/Bladder: Continent of B/B. Voiding adequately. No BM, passing flatus  Drains: 2 KAITLIN drain with red bloody output.   Diet:Tolerating regular diet  Activity Level: SBA  Anticipated  DC Date: Possibly today

## 2023-08-17 NOTE — PROGRESS NOTES
"General Surgery Progress Note    Admission Date: 8/16/2023  Today's Date: 8/17/2023         Assessment:      Zayra Altamirano is a 48 year old female at High risk for breast cancer   S/P 1.  Bilateral skin sparing prophylactic mastectomies  2. Reconstruction per Dr. Cano, please see their operative report for details regarding their portion of the procedure.   POD #1            Plan:   Plastics have discussed recovery expectations.  No further questions.    We discussed plan was to resume Xarelto tonight.  She will pay close attention to her drain output after this and call if concerns.  Drain care ed prior to discharge.  Give dressing supplies.  Follow up with Plastics team next week likely for drain removal.   Follow up with Dr. Kebede in ~ 3 weeks.  Will call with pathology results.    Dispo: Home today        Interval History:   Doing ok.  Did not sleep much overnight, but not due to pain or bed.  She actually feels the bed is comfortable.  Her pain is well controlled.  Denies numbness or tingling into UEs.  Tolerating diet.  Ambulating well.  Voiding well.  She feels ready for discharge today.          Physical Exam:   /72 (BP Location: Right arm)   Pulse 71   Temp 98.7  F (37.1  C) (Oral)   Resp 16   Ht 1.778 m (5' 10\")   Wt 85 kg (187 lb 8 oz)   LMP 08/16/2023   SpO2 97%   BMI 26.90 kg/m    I/O last 3 completed shifts:  In: 1080 [P.O.:480; I.V.:600]  Out: 155 [Drains:105; Blood:50]  General: NAD, pleasant, alert and oriented x3  Incision: not examined.  Defer to Plastics.  JPs: functioning, serosanguinous.    LABS:  All laboratory data reviewed        Alvin Marrero PA-C  Pager: [phone]  [facility]: [phone]      "

## 2023-08-20 LAB
PATH REPORT.COMMENTS IMP SPEC: NORMAL
PATH REPORT.COMMENTS IMP SPEC: NORMAL
PATH REPORT.FINAL DX SPEC: NORMAL
PATH REPORT.GROSS SPEC: NORMAL
PATH REPORT.MICROSCOPIC SPEC OTHER STN: NORMAL
PATH REPORT.RELEVANT HX SPEC: NORMAL
PHOTO IMAGE: NORMAL

## 2023-08-22 ENCOUNTER — TELEPHONE (OUTPATIENT)
Dept: SURGERY | Facility: CLINIC | Age: 48
End: 2023-08-22
Payer: COMMERCIAL

## 2023-08-22 NOTE — CONFIDENTIAL NOTE
I called and left a voicemail for Zayra letting her know her pathology results look good.     Shayy Kebede MD  [facility]  [phone]

## 2023-09-01 ENCOUNTER — OFFICE VISIT (OUTPATIENT)
Dept: SURGERY | Facility: CLINIC | Age: 48
End: 2023-09-01
Payer: COMMERCIAL

## 2023-09-01 DIAGNOSIS — Z09 SURGERY FOLLOW-UP EXAMINATION: Primary | ICD-10-CM

## 2023-09-01 PROCEDURE — 99024 POSTOP FOLLOW-UP VISIT: CPT | Performed by: SURGERY

## 2023-09-05 NOTE — PROGRESS NOTES
[facility] Breast Surgery Postoperative Note    S: Zayra reports she is doing well. Pain has been improving as expected.     Breasts: bilateral reduction pattern incisions are healing well. No erythema or induration    Pathology: reviewed and benign    A/P  Zayra Altamirano is recovering from prophylactic bilateral mastectomies with [place] reconstruction with Dr. Cano on 8/16/2023 due to increased lifetime risk of breast cancer. She is healing well. Plan for follow up in 6 months, sooner with any concerns.       Thank you for the opportunity to help in her care.    Shayy Kebede MD  [facility]  [phone]    Please route or send letter to:  Primary Care Provider (PCP) and Referring Provider

## 2023-10-02 ENCOUNTER — MYC MEDICAL ADVICE (OUTPATIENT)
Dept: FAMILY MEDICINE | Facility: CLINIC | Age: 48
End: 2023-10-02
Payer: COMMERCIAL

## 2023-10-03 ENCOUNTER — VIRTUAL VISIT (OUTPATIENT)
Dept: FAMILY MEDICINE | Facility: CLINIC | Age: 48
End: 2023-10-03
Payer: COMMERCIAL

## 2023-10-03 DIAGNOSIS — D68.51 ACTIVATED PROTEIN C RESISTANCE (H): ICD-10-CM

## 2023-10-03 DIAGNOSIS — U07.1 INFECTION DUE TO 2019 NOVEL CORONAVIRUS: Primary | ICD-10-CM

## 2023-10-03 DIAGNOSIS — D68.51 HOMOZYGOUS FACTOR V LEIDEN MUTATION (H): ICD-10-CM

## 2023-10-03 DIAGNOSIS — Z79.01 LONG TERM CURRENT USE OF ANTICOAGULANT THERAPY: ICD-10-CM

## 2023-10-03 PROCEDURE — 99214 OFFICE O/P EST MOD 30 MIN: CPT | Mod: 95 | Performed by: PHYSICIAN ASSISTANT

## 2023-10-03 NOTE — PATIENT INSTRUCTIONS
Isolate through Saturday, 10/7/23  If symptoms are improving, OK to go out and about masking 100% through Thursday, 10/12/23

## 2023-10-03 NOTE — PROGRESS NOTES
Zayra is a 48 year old who is being evaluated via a billable video visit.      How would you like to obtain your AVS? MyChart  If the video visit is dropped, the invitation should be resent by: Text to cell phone: [phone]  Will anyone else be joining your video visit? No      Assessment & Plan     Infection due to 2019 novel coronavirus  Long term current use of anticoagulant therapy  Activated protein C resistance (H24)  Homozygous Factor V Leiden mutation (H24) -   Reviewed medication Paxlovid with patient and common side effects as well as overall efficacy at preventing serious illness and hospitalization  Reviewed medication interactions and instructed patient not to take any new over-the-counter medicines or herbal supplements  Reviewed kidney function: normal  Discussed Covid treatment, symptomatic care and when to seek further care/go to emergency room.  Specific medication adjustments for patient include:  hold rivaroxaban x8 days while on paxlovid and following treatment  Pt at this time thinking she will not take paxlovid, but discussed that OK to start medication within 5 days of symptom onset and reviewed medication interactions  - nirmatrelvir and ritonavir (PAXLOVID) 300 mg/100 mg therapy pack  Dispense: 30 tablet; Refill: 0    Rosa Roldan PA-C  [facility]   Zayra is a 48 year old, presenting for the following health issues:  Covid Concern (COVID Treatment)        10/3/2023    11:05 AM   Additional Questions   Roomed by Rosa GARCÍA     COVID-19 Symptom Review  How many days ago did these symptoms start? 10/2/23 - woke up with tickley sensation in her throat  First covid test right away in AM was negative, repeated about 12 hours later after developing fever and second was positive    Are any of the following symptoms significant for you?  New or worsening difficulty breathing? Yes  Please describe what kind of difficulty you are having  breathing:No dyspnea, or dyspnea at patients normal baseline  Worsening cough? Yes, I am coughing up mucus.  Fever or chills? Yes, the highest temperature was 101?   Headache: YES  Sore throat: YES  Chest pain: No  Diarrhea: No  Body aches? YES    What treatments has patient tried? Acetaminophen   Does patient live in a nursing home, group home, or shelter? No  Does patient have a way to get food/medications during quarantined? Yes, I have a friend or family member who can help me.            Objective    Vitals - Patient Reported  SpO2 (Patient Reported): 97  Temperature (Patient Reported): 98.6  F (37  C)  Pulse (Patient Reported): 101    Physical Exam   GENERAL: alert and no distress  EYES: Eyes grossly normal to inspection.  No discharge or erythema, or obvious scleral/conjunctival abnormalities.  RESP: normal respiratory effort  SKIN: Visible skin clear. No significant rash, abnormal pigmentation or lesions.  NEURO: Cranial nerves grossly intact.  Mentation and speech appropriate for age.  PSYCH: Mentation appears normal, affect normal/bright, judgement and insight intact, normal speech and appearance well-groomed.      Video-Visit Details    Type of service:  Video Visit     Originating Location (pt. Location): Home    Distant Location (provider location):  On-site  Platform used for Video Visit: Rocio

## 2023-10-03 NOTE — TELEPHONE ENCOUNTER
Patient on Xarelto-provider visit needed.    Writer responded via [employer].    PETER KellerN, RN-BC  [facility]

## 2023-10-03 NOTE — TELEPHONE ENCOUNTER
Noted.    No further action needed from triage    PETER KellerN, RN-BC  [facility]

## 2023-11-05 DIAGNOSIS — Z86.718 HISTORY OF VENOUS THROMBOEMBOLISM: ICD-10-CM

## 2023-11-05 DIAGNOSIS — Z79.01 CHRONIC ANTICOAGULATION: ICD-10-CM

## 2023-11-06 RX ORDER — RIVAROXABAN 10 MG/1
TABLET, FILM COATED ORAL
Qty: 90 TABLET | Refills: 0 | Status: SHIPPED | OUTPATIENT
Start: 2023-11-06 | End: 2024-01-10

## 2023-11-06 NOTE — TELEPHONE ENCOUNTER
Patient last seen 1/2023 with instruction to remain on Xarelto 10mg daily and return in one year. Will route to  to schedule. 3 month refill granted.    Nancy GREENN, RN, PHN   [facility]   Office: [phone]  Fax: [phone]

## 2023-11-29 ENCOUNTER — OFFICE VISIT (OUTPATIENT)
Dept: FAMILY MEDICINE | Facility: CLINIC | Age: 48
End: 2023-11-29
Payer: COMMERCIAL

## 2023-11-29 VITALS
OXYGEN SATURATION: 98 % | WEIGHT: 187.6 LBS | SYSTOLIC BLOOD PRESSURE: 137 MMHG | TEMPERATURE: 98.8 F | RESPIRATION RATE: 16 BRPM | DIASTOLIC BLOOD PRESSURE: 76 MMHG | HEART RATE: 80 BPM | BODY MASS INDEX: 26.86 KG/M2 | HEIGHT: 70 IN

## 2023-11-29 DIAGNOSIS — Z01.818 PREOP GENERAL PHYSICAL EXAM: Primary | ICD-10-CM

## 2023-11-29 DIAGNOSIS — Z90.13 STATUS POST BILATERAL MASTECTOMY: ICD-10-CM

## 2023-11-29 DIAGNOSIS — Z91.89 AT HIGH RISK FOR BREAST CANCER: ICD-10-CM

## 2023-11-29 DIAGNOSIS — Z86.711 HISTORY OF PULMONARY EMBOLISM: ICD-10-CM

## 2023-11-29 DIAGNOSIS — D68.51 HOMOZYGOUS FACTOR V LEIDEN MUTATION (H): ICD-10-CM

## 2023-11-29 DIAGNOSIS — D68.51 ACTIVATED PROTEIN C RESISTANCE (H): ICD-10-CM

## 2023-11-29 LAB
ERYTHROCYTE [DISTWIDTH] IN BLOOD BY AUTOMATED COUNT: 12.8 % (ref 10–15)
HCT VFR BLD AUTO: 44.4 % (ref 35–47)
HGB BLD-MCNC: 14.8 G/DL (ref 11.7–15.7)
MCH RBC QN AUTO: 30.1 PG (ref 26.5–33)
MCHC RBC AUTO-ENTMCNC: 33.3 G/DL (ref 31.5–36.5)
MCV RBC AUTO: 90 FL (ref 78–100)
PLATELET # BLD AUTO: 314 10E3/UL (ref 150–450)
RBC # BLD AUTO: 4.91 10E6/UL (ref 3.8–5.2)
WBC # BLD AUTO: 8.9 10E3/UL (ref 4–11)

## 2023-11-29 PROCEDURE — 36415 COLL VENOUS BLD VENIPUNCTURE: CPT | Performed by: PHYSICIAN ASSISTANT

## 2023-11-29 PROCEDURE — 99214 OFFICE O/P EST MOD 30 MIN: CPT | Performed by: PHYSICIAN ASSISTANT

## 2023-11-29 PROCEDURE — 85027 COMPLETE CBC AUTOMATED: CPT | Performed by: PHYSICIAN ASSISTANT

## 2023-11-29 RX ORDER — BIOTIN 10000 MCG
CAPSULE ORAL
COMMUNITY

## 2023-11-29 RX ORDER — MULTIVIT-MIN/IRON/FOLIC ACID/K 18-600-40
CAPSULE ORAL
COMMUNITY

## 2023-11-29 ASSESSMENT — PAIN SCALES - GENERAL: PAINLEVEL: NO PAIN (0)

## 2023-11-29 NOTE — RESULT ENCOUNTER NOTE
Zayra,    Labs are perfect. Blood counts are all normal - normal hemoglobin/red blood cells (no anemia), normal white blood cells (infection fighting cells), normal platelets (affect ability to clot normally)      Please let me know if you have any further questions.    Take care,  Zayra Ragland PA-C on 11/29/2023 at 10:46 AM

## 2023-11-29 NOTE — PATIENT INSTRUCTIONS
Preparing for Your Surgery  Getting started  A nurse will call you to review your health history and instructions. They will give you an arrival time based on your scheduled surgery time. Please be ready to share:  Your doctor's clinic name and phone number  Your medical, surgical, and anesthesia history  A list of allergies and sensitivities  A list of medicines, including herbal treatments and over-the-counter drugs  Whether the patient has a legal guardian (ask how to send us the papers in advance)  Please tell us if you're pregnant--or if there's any chance you might be pregnant. Some surgeries may injure a fetus (unborn baby), so they require a pregnancy test. Surgeries that are safe for a fetus don't always need a test, and you can choose whether to have one.   If you have a child who's having surgery, please ask for a copy of Preparing for Your Child's Surgery.    Preparing for surgery  Within 10 to 30 days of surgery: Have a pre-op exam (sometimes called an H&P, or History and Physical). This can be done at a clinic or pre-operative center.  If you're having a , you may not need this exam. Talk to your care team.  At your pre-op exam, talk to your care team about all medicines you take. If you need to stop any medicines before surgery, ask when to start taking them again.  We do this for your safety. Many medicines can make you bleed too much during surgery. Some change how well surgery (anesthesia) drugs work.  Call your insurance company to let them know you're having surgery. (If you don't have insurance, call [phone].)  Call your clinic if there's any change in your health. This includes signs of a cold or flu (sore throat, runny nose, cough, rash, fever). It also includes a scrape or scratch near the surgery site.  If you have questions on the day of surgery, call your hospital or surgery center.  Eating and drinking guidelines  For your safety: Unless your surgeon tells you otherwise,  follow the guidelines below.  Eat and drink as usual until 8 hours before you arrive for surgery. After that, no food or milk.  Drink clear liquids until 2 hours before you arrive. These are liquids you can see through, like water, Gatorade, and Propel Water. They also include plain black coffee and tea (no cream or milk), candy, and breath mints. You can spit out gum when you arrive.  If you drink alcohol: Stop drinking it the night before surgery.  If your care team tells you to take medicine on the morning of surgery, it's okay to take it with a sip of water.  Preventing infection  Shower or bathe the night before and morning of your surgery. Follow the instructions your clinic gave you. (If no instructions, use regular soap.)  Don't shave or clip hair near your surgery site. We'll remove the hair if needed.  Don't smoke or vape the morning of surgery. You may chew nicotine gum up to 2 hours before surgery. A nicotine patch is okay.  Note: Some surgeries require you to completely quit smoking and nicotine. Check with your surgeon.  Your care team will make every effort to keep you safe from infection. We will:  Clean our hands often with soap and water (or an alcohol-based hand rub).  Clean the skin at your surgery site with a special soap that kills germs.  Give you a special gown to keep you warm. (Cold raises the risk of infection.)  Wear special hair covers, masks, gowns and gloves during surgery.  Give antibiotic medicine, if prescribed. Not all surgeries need antibiotics.  What to bring on the day of surgery  Photo ID and insurance card  Copy of your health care directive, if you have one  Glasses and hearing aids (bring cases)  You can't wear contacts during surgery  Inhaler and eye drops, if you use them (tell us about these when you arrive)  CPAP machine or breathing device, if you use them  A few personal items, if spending the night  If you have . . .  A pacemaker, ICD (cardiac defibrillator) or other  implant: Bring the ID card.  An implanted stimulator: Bring the remote control.  A legal guardian: Bring a copy of the certified (court-stamped) guardianship papers.  Please remove any jewelry, including body piercings. Leave jewelry and other valuables at home.  If you're going home the day of surgery  You must have a responsible adult drive you home. They should stay with you overnight as well.  If you don't have someone to stay with you, and you aren't safe to go home alone, we may keep you overnight. Insurance often won't pay for this.  After surgery  If it's hard to control your pain or you need more pain medicine, please call your surgeon's office.  Questions?   If you have any questions for your care team, list them here: _________________________________________________________________________________________________________________________________________________________________________ ____________________________________ ____________________________________ ____________________________________  For informational purposes only. Not to replace the advice of your health care provider. Copyright   2003, 2019 [facility]. All rights reserved. Clinically reviewed by Zayra Mccoy MD. SMARTworks [id number] - REV 12/22.

## 2023-11-29 NOTE — PROGRESS NOTES
[address] [address]  Phone: [phone]  Primary Provider: Allie Cotter  Pre-op Performing Provider: ZAYRA ROB      PREOPERATIVE EVALUATION:  Today's date: 11/29/2023    Zayra is a 48 year old, presenting for the following:  Pre-Op Exam        11/29/2023     9:51 AM   Additional Questions   Roomed by Shefali SOLITARIO     Surgical Information:  Surgery/Procedure: Bilateral Breast reconstruction Fat Grafting and Liposuction, C section scar revision  Surgery Location: [facility]   Surgeon: Ovidio Cano MD  Surgery Date: 12/05/2023  Time of Surgery: 7:30 AM   Where patient plans to recover: At home with family  Fax number for surgical facility: [phone]    Assessment & Plan     The proposed surgical procedure is considered INTERMEDIATE risk.    Preop general physical exam  - CBC with platelets    Status post bilateral mastectomy  At high risk for breast cancer  Reason for surgery     Homozygous Factor V Leiden mutation (H24)  Activated protein C resistance (H24)  History of pulmonary embolism  On xarelto - will hold 2 days prior to surgery, restart at discretion of surgery team but ideally day after surgery as long as no bleeding complications.        - No identified additional risk factors other than previously addressed    Antiplatelet or Anticoagulation Medication Instructions:   - apixaban (Eliquis), edoxaban (Savaysa), rivaroxaban (Xarelto): Bleeding risk is moderate or high for this procedure AND CrCl  (>=) 50 mL/min. HOLD 2 days before surgery.  Restart at discretion of surgeon, ideally restart the day after her surgery as long as she has no bleeding complications    Additional Medication Instructions:  Patient is to take all scheduled medications on the day of surgery EXCEPT for modifications listed below:   - Herbal medications and vitamins: HOLD 7 days prior to  "surgery.    RECOMMENDATION:  APPROVAL GIVEN to proceed with proposed procedure, without further diagnostic evaluation.    Zayra Ragland PA-C on 11/29/2023 at 10:01 AM      Subjective     HPI related to upcoming procedure:     Zayra Altamirano is a 48 year old female who presents for preop exam undergoing Bilateral Breast Fat Graft and Liposuction.    S/P bilateral prophylactic mastectomy due to high risk for breast cancer     COVID 10/3 - mild symptoms, recovered well     On xarelto for history of DVT & PE in 2005, homozygous factor V and protein C resistance     Per heme recommendations 1/2023 - \"She can simply stop her rivaroxaban for 24-48 hours prior to her surgery. Then she can restart her rivaroxaban the day after her surgery as long as she has no bleeding complications.\"        11/29/2023     9:34 AM   Preop Questions   1. Have you ever had a heart attack or stroke? No   2. Have you ever had surgery on your heart or blood vessels, such as a stent placement, a coronary artery bypass, or surgery on an artery in your head, neck, heart, or legs? No   3. Do you have chest pain with activity? No   4. Do you have a history of  heart failure? No   5. Do you currently have a cold, bronchitis or symptoms of other infection? No   6. Do you have a cough, shortness of breath, or wheezing? No   7. Do you or anyone in your family have previous history of blood clots? YES - personal history of PE, factor V, on xarelto    8. Do you or does anyone in your family have a serious bleeding problem such as prolonged bleeding following surgeries or cuts? No   9. Have you ever had problems with anemia or been told to take iron pills? No   10. Have you had any abnormal blood loss such as black, tarry or bloody stools, or abnormal vaginal bleeding? No   11. Have you ever had a blood transfusion? No   12. Are you willing to have a blood transfusion if it is medically needed before, during, or after your surgery? Yes   13. Have " you or any of your relatives ever had problems with anesthesia? No   14. Do you have sleep apnea, excessive snoring or daytime drowsiness? No   15. Do you have any artifical heart valves or other implanted medical devices like a pacemaker, defibrillator, or continuous glucose monitor? No   16. Do you have artificial joints? No   17. Are you allergic to latex? No   18. Is there any chance that you may be pregnant? No       Health Care Directive:  Patient does not have a Health Care Directive or Living Will: Discussed advance care planning with patient; information given to patient to review.    Preoperative Review of :   reviewed - no record of controlled substances prescribed. Since August 2023 (post op oxycodone)    Status of Chronic Conditions:  See problem list for active medical problems.  Problems all longstanding and stable, except as noted/documented.  See ROS for pertinent symptoms related to these conditions.    Review of Systems  CONSTITUTIONAL: NEGATIVE for fever, chills, change in weight  INTEGUMENTARY/SKIN: NEGATIVE for worrisome rashes, moles or lesions  EYES: NEGATIVE for vision changes or irritation  ENT/MOUTH: NEGATIVE for ear, mouth and throat problems  RESP: NEGATIVE for significant cough or SOB  CV: NEGATIVE for chest pain, palpitations or peripheral edema  GI: NEGATIVE for nausea, abdominal pain, heartburn, or change in bowel habits  : NEGATIVE for frequency, dysuria, or hematuria  MUSCULOSKELETAL: NEGATIVE for significant arthralgias or myalgia  NEURO: NEGATIVE for weakness, dizziness or paresthesias  ENDOCRINE: NEGATIVE for temperature intolerance, skin/hair changes  HEME: NEGATIVE for bleeding problems  PSYCHIATRIC: NEGATIVE for changes in mood or affect    Patient Active Problem List    Diagnosis Date Noted    History of gestational diabetes 10/19/2011     Priority: High     Consider testing at 20w      At high risk for breast cancer 08/16/2023     Priority: Medium    Abnormal  maternal glucose tolerance, complicating pregnancy, childbirth, or the puerperium, unspecified as to episode of care 2022     Priority: Medium     Formatting of this note might be different from the original.  Created by Conversion      Homozygous Factor V Leiden mutation (H24) 2017     Priority: Medium    Other pulmonary embolism without acute cor pulmonale, unspecified chronicity (H) 2017     Priority: Medium    Anxiety about health 2017     Priority: Medium    Activated protein C resistance (H24) 2017     Priority: Medium    Long term current use of anticoagulant therapy 05/10/2016     Priority: Medium    Ankle pain, left 05/10/2016     Priority: Medium    Ankle pain, right 05/10/2016     Priority: Medium    CARDIOVASCULAR SCREENING; LDL GOAL LESS THAN 160 2012     Priority: Medium    Embolism and thrombosis (H) 2005     Priority: Medium     left  Problem list name updated by automated process. Provider to review      Pulmonary embolism and infarction (H) 2005     Priority: Medium     Problem list name updated by automated process. 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  change goal INR 1.3-2.0    Rosacea      Past Surgical History:   Procedure Laterality Date    C/SECTION, LOW TRANSVERSE  2008    ,  Low Transverse     SECTION  2012    Procedure: SECTION; Surgeon:ELY KRAMER; Location:[facility] L[facility]    COLONOSCOPY N/A 2022    Procedure: COLONOSCOPY, WITH POLYPECTOMY ;  Surgeon: Leventhal, Thomas Michael, MD;  Location: [facility] OR    MASTECTOMY SIMPLE BILATERAL Bilateral 2023    Procedure: bilateral skin sparing mastectomies;  Surgeon: Shayy Kebede MD;  Location: [facility] OR    MASTOPEXY BILATERAL Bilateral 2023    Procedure: bilateral breast reconstruction with mastopexy technique;  Surgeon: Ovidio Cano MD;  Location:  OR    [facility] EVENT MONITOR (CARDIAC - [place])  10/08    Normal/had palpatiations.     [facility] COLPOSCOPY VULVA W BIOPSY       Current Outpatient Medications   Medication Sig Dispense Refill    acetaminophen (TYLENOL) 500 MG tablet Take 500-1,000 mg by mouth every 6 hours as needed for mild pain      Ascorbic Acid (VITAMIN C) 500 MG CAPS       Biotin 10 MG CAPS       loratadine (CLARITIN) 10 MG tablet Take 10 mg by mouth daily as needed for allergies      rivaroxaban ANTICOAGULANT (XARELTO ANTICOAGULANT) 10 MG TABS tablet TAKE 1 TAB BY MOUTH DAILY WITH DINNER 90 tablet 0    VITAMIN D, CHOLECALCIFEROL, PO Take 1,000 Units by mouth daily      STATIN NOT PRESCRIBED (INTENTIONAL) Please choose reason not prescribed from choices below. (Patient not taking: Reported on 2023)         Allergies   Allergen Reactions    Benadryl Allergy Other (See Comments)     Had paradoxical reaction - made her very hyper        Social History     Tobacco Use    Smoking status: Never    Smokeless tobacco: Never   Substance Use Topics    Alcohol use: Yes     Alcohol/week: 1.0 standard drink of alcohol     Types: 1 Standard drinks or equivalent per week     Comment: rare occ     Family History   Problem Relation Age of Onset    Breast Cancer Mother 63        IDC 2012, recurrent 2018; ER+    Hypertension Mother     Cerebrovascular Disease Mother         Ischemic stroke  "3/18 at age 68.    Lymphoma Mother 70        follicular    Lipids Father     Hypertension Father     Circulatory Father         veins stripped     Unknown/Adopted Father         hyperlipidemia     Cataracts Father     Other - See Comments Father         bladder lesions    Blood Disease Sister         FACTOR 5 LEIDEN     Blood Disease Sister         FACTOR 5    Blood Disease Brother         FACTOR 5 LEIDEN HOMOZYGOUS    Breast Cancer Maternal Grandmother 58        contralateral at 74    Diabetes Maternal Grandmother         Type 2    Cerebrovascular Disease Maternal Grandmother     Cardiovascular Maternal Grandmother         MI    Hypertension Maternal Grandmother     Cancer Maternal Grandfather 68        esophageal or stomach?    Substance Abuse Paternal Grandfather     Breast Cancer Maternal Aunt 71    Breast Cancer Maternal Aunt 60    Lymphoma Maternal Aunt 70    Cancer Maternal Aunt 71        vulvar    Pulmonary Embolism Maternal Aunt         bilateral    Cancer Paternal Aunt          at 58; unknown type    Throat cancer Paternal Uncle 55    Brain Cancer Paternal Uncle 61         at 64    Lupus Paternal Uncle      History   Drug Use No         Objective     /76   Pulse 80   Temp 98.8  F (37.1  C) (Tympanic)   Resp 16   Ht 1.768 m (5' 9.61\")   Wt 85.1 kg (187 lb 9.6 oz)   LMP 2023 (Exact Date)   SpO2 98%   BMI 27.22 kg/m      Physical Exam    GENERAL APPEARANCE: healthy, alert and no distress     EYES: EOMI, PERRL     HENT: ear canals and TM's normal and nose and mouth without ulcers or lesions     NECK: no adenopathy, no asymmetry, masses, or scars and thyroid normal to palpation     RESP: lungs clear to auscultation - no rales, rhonchi or wheezes     CV: regular rates and rhythm, normal S1 S2, no S3 or S4 and no murmur, click or rub     ABDOMEN:  soft, nontender, no HSM or masses and bowel sounds normal     MS: extremities normal- no gross deformities noted, no evidence of " inflammation in joints, FROM in all extremities.     SKIN: no suspicious lesions or rashes     NEURO: Normal strength and tone, sensory exam grossly normal, mentation intact and speech normal     PSYCH: mentation appears normal. and affect normal/bright     LYMPHATICS: No cervical adenopathy    Recent Labs   Lab Test 08/04/23  1130 03/14/23  1651 01/02/23  1037 08/04/22  0818 03/25/22  0841   HGB 14.2 14.8 14.5  --   --     260 275  --   --     139 140  --   --    POTASSIUM 4.6 4.1 4.3  --    < >   CR 0.79 0.83 0.82  --   --    A1C  --   --  5.2 5.1  --     < > = values in this interval not displayed.        Diagnostics:  Recent Results (from the past 24 hour(s))   CBC with platelets    Collection Time: 11/29/23 10:40 AM   Result Value Ref Range    WBC Count 8.9 4.0 - 11.0 10e3/uL    RBC Count 4.91 3.80 - 5.20 10e6/uL    Hemoglobin 14.8 11.7 - 15.7 g/dL    Hematocrit 44.4 35.0 - 47.0 %    MCV 90 78 - 100 fL    MCH 30.1 26.5 - 33.0 pg    MCHC 33.3 31.5 - 36.5 g/dL    RDW 12.8 10.0 - 15.0 %    Platelet Count 314 150 - 450 10e3/uL      No EKG required, no history of coronary heart disease, significant arrhythmia, peripheral arterial disease or other structural heart disease.    Revised Cardiac Risk Index (RCRI):  The patient has the following serious cardiovascular risks for perioperative complications:   - No serious cardiac risks = 0 points     RCRI Interpretation: 0 points: Class I (very low risk - 0.4% complication rate)       Signed Electronically by: Zayra Gordon PA-C  Copy of this evaluation report is provided to requesting physician.

## 2023-12-04 ENCOUNTER — PATIENT OUTREACH (OUTPATIENT)
Dept: CARE COORDINATION | Facility: CLINIC | Age: 48
End: 2023-12-04
Payer: COMMERCIAL

## 2024-01-08 NOTE — PROGRESS NOTES
[facility]  [address] [place] [id number]  Main: [phone], Fax: [phone]    Patient seen at: [facility] at [address] [place] [address]    Outpatient Visit Note:    Patient: Zayra Altamirano  MRN: [id number]  : 1975  TIMBO: January 10, 2024    Reason for visit:  Follow up, history of venous thromboembolism on long term anticoagulation     Clinical History Summary:  Zayra Altamirano is a 48 year old female with past medical history significant for estrogen provoked venous thromboembolism in  on NuvaRing contraceptive. She was subsequently diagnosed with HOMOZYGOUS factor V Leiden mutation. After completion of initial therapeutic anticoagulation period on warfarin she was changed to Xarelto at prophylactic intensity. She is currently on Xarelto 10mg once daily. She has historically followed with Dr. Nagy and then FRANK Hanson.     Thrombosis Events:   - Dec 2005 - NuvaRing x 1 year, then presented with left leg DVT and bilateral PE. Homozygous FVL diagnosis.     She is  and was on Lovenox antepartum and postpartum.      Interim History:  Today, Zayra notes that she is doing quite well. In 2023, she underwent prophylactic double mastectomy due to family history of breast cancer.  She underwent breast reconstruction fat grafting, liposuction and  scar revision on 2023. She tolerated the procedures well without bleeding or clotting complications. She notes that she does not experience bleeding issues on the Xarelto. She denies heavy menses.     She did have questions regarding Eliquis vs Xarelto as well as statin interactions with DOACs. I reviewed her last labs and her calculated ASCVD risk score is 1.2%. She plans on having repeat fasting lipids drawn tomorrow and revisiting the conversation with her primary care provider. She does have a family history of cardiovascular disease and strokes. We also  discussed dietary and exercise changes as well.       ROS:  Denies any bleeding complications. Specifically, no frequent epistaxis. No issues with oral mucosal bleeding. Denies any hematuria or blood in stools. Denies any shortness of breath. No chest pain. No cough. No fever.      Medications:   Current Outpatient Medications   Medication Sig Dispense Refill    rivaroxaban ANTICOAGULANT (XARELTO ANTICOAGULANT) 10 MG TABS tablet Take 1 tablet (10 mg) by mouth daily with food 90 tablet 3    acetaminophen (TYLENOL) 500 MG tablet Take 500-1,000 mg by mouth every 6 hours as needed for mild pain      Ascorbic Acid (VITAMIN C) 500 MG CAPS       Biotin 10 MG CAPS       loratadine (CLARITIN) 10 MG tablet Take 10 mg by mouth daily as needed for allergies      STATIN NOT PRESCRIBED (INTENTIONAL) Please choose reason not prescribed from choices below. 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  change goal INR 1.3-2.0    Rosacea         Social History:   Social History     Tobacco Use    Smoking status: Never    Smokeless tobacco: Never    Vaping Use    Vaping Use: Never used   Substance Use Topics    Alcohol use: Yes     Alcohol/week: 1.0 standard drink of alcohol     Types: 1 Standard drinks or equivalent per week     Comment: rare occ    Drug use: No       Family History:  Deferred.    Objective:  Vitals: /71 (BP Location: Right arm, Patient Position: Sitting, Cuff Size: Adult Regular)   Pulse 81   Temp (!) 96.2  F (35.7  C) (Tympanic)   Wt 86 kg (189 lb 8 oz)   LMP 11/08/2023 (Exact Date)   SpO2 99%   BMI 27.50 kg/m       Exam:   Constitutional: Appears well, no distress  HEENT: Pupils equal and round. No scleral icterus or hemorrhage.   Respiratory: no increased work of breathing.   Mus/Skele: no edema of lower extremities  Skin: no petechiae, no ecchymosis on exposed dermis.  Neuro: CN II-XII intact. Normal gait. AOx3      Labs:  CBC RESULTS:   Recent Labs   Lab Test 11/29/23  1040   WBC 8.9   RBC 4.91   HGB 14.8   HCT 44.4   MCV 90   MCH 30.1   MCHC 33.3   RDW 12.8        Last Comprehensive Metabolic Panel:  Sodium   Date Value Ref Range Status   08/04/2023 140 136 - 145 mmol/L Final   10/12/2020 137 133 - 144 mmol/L Final     Potassium   Date Value Ref Range Status   08/04/2023 4.6 3.4 - 5.3 mmol/L Final   03/25/2022 4.9 3.4 - 5.3 mmol/L Final   10/12/2020 4.2 3.4 - 5.3 mmol/L Final     Chloride   Date Value Ref Range Status   08/04/2023 104 98 - 107 mmol/L Final   03/25/2022 105 94 - 109 mmol/L Final   10/12/2020 103 94 - 109 mmol/L Final     Carbon Dioxide   Date Value Ref Range Status   10/12/2020 28 20 - 32 mmol/L Final     Carbon Dioxide (CO2)   Date Value Ref Range Status   08/04/2023 28 22 - 29 mmol/L Final   03/25/2022 26 20 - 32 mmol/L Final     Anion Gap   Date Value Ref Range Status   08/04/2023 8 7 - 15 mmol/L Final   03/25/2022 6 3 - 14 mmol/L Final   10/12/2020 6 3 - 14 mmol/L Final     Glucose   Date Value Ref Range Status   03/25/2022 112 (H) 70 - 99 mg/dL Final   10/12/2020 101 (H) 70 - 99 mg/dL Final      Comment:     Fasting specimen     GLUCOSE BY METER POCT   Date Value Ref Range Status   08/17/2023 133 (H) 70 - 99 mg/dL Final     Comment:     /RN Notified     Urea Nitrogen   Date Value Ref Range Status   08/04/2023 11.6 6.0 - 20.0 mg/dL Final   03/25/2022 18 7 - 30 mg/dL Final   10/12/2020 13 7 - 30 mg/dL Final     Creatinine   Date Value Ref Range Status   08/04/2023 0.79 0.51 - 0.95 mg/dL Final   10/12/2020 0.84 0.52 - 1.04 mg/dL Final     GFR Estimate   Date Value Ref Range Status   08/04/2023 >90 >60 mL/min/1.73m2 Final   10/12/2020 84 >60 mL/min/[1.73_m2] Final     Comment:     Non  GFR Calc  Starting 12/18/2018, serum creatinine based estimated GFR (eGFR) will be   calculated using the Chronic Kidney Disease Epidemiology Collaboration   (CKD-EPI) equation.       Calcium   Date Value Ref Range Status   08/04/2023 9.5 8.6 - 10.0 mg/dL Final   10/12/2020 9.3 8.5 - 10.1 mg/dL Final     Liver Function Studies -   Recent Labs   Lab Test 01/02/23  1037   PROTTOTAL 6.7   ALBUMIN 4.3   BILITOTAL 0.3   ALKPHOS 54   AST 24   ALT 12        Imaging:  No results found for this or any previous visit (from the past 744 hour(s)).     Assessment:  History of estrogen provoked venous thromboembolism   Homozygous FVL   On long term AC with Xarelto at 20mg once daily  S/P Bilateral Mastectomy and reconstruction, prophylactically due to strong family history of BRCA negative breast cancer     Plan:  Zayra remains an appropriate candidate to stay on anticoagulation for secondary prophylaxis of venous thromboembolism. She was counseled on the similarities and differences between Eliquis and Xarelto. She elects to stay on Xarelto at 10mg once daily dose. Prescription renewed.     We discussed having repeat FLP completed at PCP office visit tomorrow and then reassessing her ASCVD 10 year risk score to help determine statin initiation. There is no interaction between statin and DOAC medications. She does report  family history of statin intolerance so if initiation is recommended, would trial low intensity rosuvastatin first. Diet/exercise recommendations discussed as well.     Follow up in 1 year, sooner if she plans to have any additional procedures.         Donna Mcginnis, MPH, PA-C  [facility]    30 minutes spent by me on the date of the encounter doing chart review, review of outside records, review of test results, interpretation of tests, patient visit, and documentation

## 2024-01-10 ENCOUNTER — OFFICE VISIT (OUTPATIENT)
Dept: HEMATOLOGY | Facility: CLINIC | Age: 49
End: 2024-01-10
Attending: PHYSICIAN ASSISTANT
Payer: COMMERCIAL

## 2024-01-10 VITALS
BODY MASS INDEX: 27.5 KG/M2 | WEIGHT: 189.5 LBS | DIASTOLIC BLOOD PRESSURE: 71 MMHG | SYSTOLIC BLOOD PRESSURE: 121 MMHG | TEMPERATURE: 96.2 F | HEART RATE: 81 BPM | OXYGEN SATURATION: 99 %

## 2024-01-10 DIAGNOSIS — Z79.01 CHRONIC ANTICOAGULATION: ICD-10-CM

## 2024-01-10 DIAGNOSIS — Z86.718 HISTORY OF VENOUS THROMBOEMBOLISM: Primary | ICD-10-CM

## 2024-01-10 DIAGNOSIS — D68.51 HOMOZYGOUS FACTOR V LEIDEN MUTATION (H): ICD-10-CM

## 2024-01-10 DIAGNOSIS — E78.5 DYSLIPIDEMIA: ICD-10-CM

## 2024-01-10 PROCEDURE — 99214 OFFICE O/P EST MOD 30 MIN: CPT | Performed by: PHYSICIAN ASSISTANT

## 2024-01-10 PROCEDURE — 99213 OFFICE O/P EST LOW 20 MIN: CPT | Performed by: PHYSICIAN ASSISTANT

## 2024-01-10 NOTE — PATIENT INSTRUCTIONS
[facility]  [facility]  [facility] [address], [address], [place] [address]  Main: [phone], Fax: [phone]    Zayra,   It was a pleasure seeing you today.  Thank you for allowing us to be involved in your care.  Please let us know if there is anything else we can do for you, so that we can be sure you are leaving completely satisfied with your care experience. Below is the plan that we discussed.     Continue Xarelto.   No interaction with statin. If you do start one, try low dose rosuvastatin.   Call if you plan to have another surgery       We would like a provider on our team to see you at least annually for optimal care and to allow us to continue to prescribe for you.      Return to clinic in 1 year    If you have questions or concerns, please don't hesitate to send a [employer] Message for non urgent matters or contact my nurse clinician, Mayra Moreno at [phone]. If they are unavailable and you have immediate concerns, please call [phone] and ask for a nurse.     Donna Mcginnis, MPH, PA-C  [facility]

## 2024-01-11 ENCOUNTER — OFFICE VISIT (OUTPATIENT)
Dept: FAMILY MEDICINE | Facility: CLINIC | Age: 49
End: 2024-01-11
Payer: COMMERCIAL

## 2024-01-11 VITALS
HEART RATE: 75 BPM | TEMPERATURE: 97.9 F | WEIGHT: 191 LBS | RESPIRATION RATE: 16 BRPM | HEIGHT: 70 IN | DIASTOLIC BLOOD PRESSURE: 85 MMHG | OXYGEN SATURATION: 98 % | BODY MASS INDEX: 27.35 KG/M2 | SYSTOLIC BLOOD PRESSURE: 118 MMHG

## 2024-01-11 DIAGNOSIS — Z13.0 SCREENING FOR IRON DEFICIENCY ANEMIA: ICD-10-CM

## 2024-01-11 DIAGNOSIS — I74.9 EMBOLISM AND THROMBOSIS (H): ICD-10-CM

## 2024-01-11 DIAGNOSIS — Z13.1 SCREENING FOR DIABETES MELLITUS: ICD-10-CM

## 2024-01-11 DIAGNOSIS — Z00.00 ROUTINE GENERAL MEDICAL EXAMINATION AT A HEALTH CARE FACILITY: Primary | ICD-10-CM

## 2024-01-11 DIAGNOSIS — E78.5 HYPERLIPIDEMIA LDL GOAL <100: ICD-10-CM

## 2024-01-11 DIAGNOSIS — Z13.29 SCREENING FOR THYROID DISORDER: ICD-10-CM

## 2024-01-11 PROBLEM — I26.99 OTHER PULMONARY EMBOLISM WITHOUT ACUTE COR PULMONALE, UNSPECIFIED CHRONICITY (H): Status: RESOLVED | Noted: 2017-01-16 | Resolved: 2024-01-11

## 2024-01-11 LAB
ALBUMIN SERPL BCG-MCNC: 4.4 G/DL (ref 3.5–5.2)
ALP SERPL-CCNC: 53 U/L (ref 40–150)
ALT SERPL W P-5'-P-CCNC: 12 U/L (ref 0–50)
ANION GAP SERPL CALCULATED.3IONS-SCNC: 8 MMOL/L (ref 7–15)
AST SERPL W P-5'-P-CCNC: 17 U/L (ref 0–45)
BILIRUB SERPL-MCNC: 0.6 MG/DL
BUN SERPL-MCNC: 11.1 MG/DL (ref 6–20)
CALCIUM SERPL-MCNC: 9.3 MG/DL (ref 8.6–10)
CHLORIDE SERPL-SCNC: 102 MMOL/L (ref 98–107)
CHOLEST SERPL-MCNC: 210 MG/DL
CREAT SERPL-MCNC: 0.73 MG/DL (ref 0.51–0.95)
DEPRECATED HCO3 PLAS-SCNC: 30 MMOL/L (ref 22–29)
EGFRCR SERPLBLD CKD-EPI 2021: >90 ML/MIN/1.73M2
ERYTHROCYTE [DISTWIDTH] IN BLOOD BY AUTOMATED COUNT: 12.6 % (ref 10–15)
FASTING STATUS PATIENT QL REPORTED: YES
GLUCOSE SERPL-MCNC: 90 MG/DL (ref 70–99)
HBA1C MFR BLD: 5.2 % (ref 0–5.6)
HCT VFR BLD AUTO: 43.3 % (ref 35–47)
HDLC SERPL-MCNC: 52 MG/DL
HGB BLD-MCNC: 14.4 G/DL (ref 11.7–15.7)
LDLC SERPL CALC-MCNC: 133 MG/DL
MCH RBC QN AUTO: 30 PG (ref 26.5–33)
MCHC RBC AUTO-ENTMCNC: 33.3 G/DL (ref 31.5–36.5)
MCV RBC AUTO: 90 FL (ref 78–100)
NONHDLC SERPL-MCNC: 158 MG/DL
PLATELET # BLD AUTO: 284 10E3/UL (ref 150–450)
POTASSIUM SERPL-SCNC: 4.4 MMOL/L (ref 3.4–5.3)
PROT SERPL-MCNC: 7 G/DL (ref 6.4–8.3)
RBC # BLD AUTO: 4.8 10E6/UL (ref 3.8–5.2)
SODIUM SERPL-SCNC: 140 MMOL/L (ref 135–145)
TRIGL SERPL-MCNC: 125 MG/DL
TSH SERPL DL<=0.005 MIU/L-ACNC: 2.07 UIU/ML (ref 0.3–4.2)
WBC # BLD AUTO: 8.4 10E3/UL (ref 4–11)

## 2024-01-11 PROCEDURE — 99213 OFFICE O/P EST LOW 20 MIN: CPT | Mod: 25 | Performed by: NURSE PRACTITIONER

## 2024-01-11 PROCEDURE — 83036 HEMOGLOBIN GLYCOSYLATED A1C: CPT | Performed by: NURSE PRACTITIONER

## 2024-01-11 PROCEDURE — 84443 ASSAY THYROID STIM HORMONE: CPT | Performed by: NURSE PRACTITIONER

## 2024-01-11 PROCEDURE — 36415 COLL VENOUS BLD VENIPUNCTURE: CPT | Performed by: NURSE PRACTITIONER

## 2024-01-11 PROCEDURE — 80061 LIPID PANEL: CPT | Performed by: NURSE PRACTITIONER

## 2024-01-11 PROCEDURE — 85027 COMPLETE CBC AUTOMATED: CPT | Performed by: NURSE PRACTITIONER

## 2024-01-11 PROCEDURE — 80053 COMPREHEN METABOLIC PANEL: CPT | Performed by: NURSE PRACTITIONER

## 2024-01-11 PROCEDURE — 99396 PREV VISIT EST AGE 40-64: CPT | Performed by: NURSE PRACTITIONER

## 2024-01-11 ASSESSMENT — ENCOUNTER SYMPTOMS
EYE PAIN: 0
MYALGIAS: 0
BREAST MASS: 0
PARESTHESIAS: 0
JOINT SWELLING: 0
FEVER: 0
CONSTIPATION: 0
HEARTBURN: 0
PALPITATIONS: 0
NERVOUS/ANXIOUS: 0
COUGH: 0
HEMATURIA: 0
CHILLS: 0
ABDOMINAL PAIN: 0
FREQUENCY: 0
DIARRHEA: 0
DYSURIA: 0
HEADACHES: 0
SHORTNESS OF BREATH: 0
SORE THROAT: 0
HEMATOCHEZIA: 0
WEAKNESS: 0
ARTHRALGIAS: 0
DIZZINESS: 0
NAUSEA: 0

## 2024-01-11 ASSESSMENT — PAIN SCALES - GENERAL: PAINLEVEL: NO PAIN (0)

## 2024-01-11 NOTE — PROGRESS NOTES
SUBJECTIVE:   Zayra is a 48 year old, presenting for the following:  Physical        1/11/2024    10:20 AM   Additional Questions   Roomed by Amanda RYAN         1/11/2024    10:20 AM   Patient Reported Additional Medications   Patient reports taking the following new medications Magnesium       Healthy Habits:     Getting at least 3 servings of Calcium per day:  Yes    Bi-annual eye exam:  Yes    Dental care twice a year:  Yes    Sleep apnea or symptoms of sleep apnea:  None    Diet:  Regular (no restrictions)    Frequency of exercise:  6-7 days/week    Duration of exercise:  30-45 minutes    Taking medications regularly:  Yes    Medication side effects:  None    Additional concerns today:  No      Today's PHQ-2 Score:       1/11/2024    10:14 AM   PHQ-2 ( 1999 [employer])   Q1: Little interest or pleasure in doing things 0   Q2: Feeling down, depressed or hopeless 0   PHQ-2 Score 0   Q1: Little interest or pleasure in doing things Not at all   Q2: Feeling down, depressed or hopeless Not at all   PHQ-2 Score 0       PROBLEMS TO ADD ON...  -------------------------------------  The 10-year ASCVD risk score (Margarita LABOY, et al., 2019) is: 1.1%    Values used to calculate the score:      Age: 48 years      Sex: Female      Is Non- : No      Diabetic: No      Tobacco smoker: No      Systolic Blood Pressure: 118 mmHg      Is BP treated: No      HDL Cholesterol: 56 mg/dL      Total Cholesterol: 227 mg/dL       Social History     Tobacco Use    Smoking status: Never    Smokeless tobacco: Never   Substance Use Topics    Alcohol use: Yes     Alcohol/week: 1.0 standard drink of alcohol     Types: 1 Standard drinks or equivalent per week     Comment: rare occ             1/11/2024    10:14 AM   Alcohol Use   Prescreen: >3 drinks/day or >7 drinks/week? No          No data to display              Reviewed orders with patient.  Reviewed health maintenance and updated orders accordingly - Yes  Lab work is in  process    Breast Cancer Screening:    FHS-7:       9/20/2021    11:26 AM 12/4/2021     8:10 AM 9/19/2022     9:57 AM   Breast CA Risk Assessment (FHS-7)   Did any of your first-degree relatives have breast or ovarian cancer? Yes Yes Yes   Did any of your relatives have bilateral breast cancer? No No No   Did any man in your family have breast cancer? No No No   Did any woman in your family have breast and ovarian cancer? No Yes No   Did any woman in your family have breast cancer before age 50 y? No No No   Do you have 2 or more relatives with breast and/or ovarian cancer? Yes Yes Yes   Do you have 2 or more relatives with breast and/or bowel cancer? No No Yes     click delete button to remove this line now  Mammogram Screening - Mammography discussed, not appropriate due to bilateral prophylactic mastectomies.    Pertinent mammograms are reviewed under the imaging tab.    History of abnormal Pap smear: NO - age 30-65 PAP every 5 years with negative HPV co-testing recommended      Latest Ref Rng & Units 10/12/2020     8:22 AM 4/19/2017    11:14 AM 4/19/2017    11:10 AM   PAP / HPV   PAP (Historical)  NIL  NIL     HPV 16 DNA NEG^Negative Negative   Negative    HPV 18 DNA NEG^Negative Negative   Negative    Other HR HPV NEG^Negative Negative   Negative      Reviewed and updated as needed this visit by clinical staff   Tobacco  Allergies  Meds              Reviewed and updated as needed this visit by Provider                     Review of Systems   Constitutional:  Negative for chills and fever.   HENT:  Negative for congestion, ear pain, hearing loss and sore throat.    Eyes:  Negative for pain and visual disturbance.   Respiratory:  Negative for cough and shortness of breath.    Cardiovascular:  Negative for chest pain, palpitations and peripheral edema.   Gastrointestinal:  Negative for abdominal pain, constipation, diarrhea, heartburn, hematochezia and nausea.   Breasts:  Negative for tenderness, breast mass  "and discharge.   Genitourinary:  Negative for dysuria, frequency, genital sores, hematuria, pelvic pain, urgency, vaginal bleeding and vaginal discharge.   Musculoskeletal:  Negative for arthralgias, joint swelling and myalgias.   Skin:  Negative for rash.   Neurological:  Negative for dizziness, weakness, headaches and paresthesias.   Psychiatric/Behavioral:  Negative for mood changes. The patient is not nervous/anxious.           OBJECTIVE:   /85 (BP Location: Right arm, Patient Position: Sitting, Cuff Size: Adult Regular)   Pulse 75   Temp 97.9  F (36.6  C) (Temporal)   Resp 16   Ht 1.773 m (5' 9.8\")   Wt 86.6 kg (191 lb)   LMP 01/01/2024 (Exact Date)   SpO2 98%   BMI 27.56 kg/m    Physical Exam  GENERAL: healthy, alert and no distress  EYES: Eyes grossly normal to inspection, PERRL and conjunctivae and sclerae normal  HENT: ear canals and TM's normal, nose and mouth without ulcers or lesions  NECK: no adenopathy, no asymmetry, masses, or scars and thyroid normal to palpation  RESP: lungs clear to auscultation - no rales, rhonchi or wheezes  BREAST: bilateral mastectomies, well healing incisions.  CV: regular rate and rhythm, normal S1 S2, no S3 or S4, no murmur, click or rub, no peripheral edema and peripheral pulses strong  ABDOMEN: soft, nontender, no hepatosplenomegaly, no masses and bowel sounds normal  MS: no gross musculoskeletal defects noted, no edema  SKIN: no suspicious lesions or rashes  NEURO: Normal strength and tone, mentation intact and speech normal  PSYCH: mentation appears normal, affect normal/bright        ASSESSMENT/PLAN:   (Z00.00) Routine general medical examination at a health care facility  (primary encounter diagnosis)  Comment: Reviewed medical/social/family history and health maintenance   Plan:     (I74.9) Embolism and thrombosis (H)  Comment: Continue on Xarelto.  Plan:     (E78.5) Hyperlipidemia LDL goal <100  Comment: Low ASCVD risk, but we could make the argument " "for a statin based on previous clotting history.  Will consider statin pending lipid panel.  Plan: Lipid panel reflex to direct LDL Fasting            (Z13.29) Screening for thyroid disorder  Comment:   Plan: TSH with free T4 reflex            (Z13.1) Screening for diabetes mellitus  Comment:   Plan: Comprehensive metabolic panel (BMP + Alb, Alk         Phos, ALT, AST, Total. Bili, TP), Hemoglobin         A1c            (Z13.0) Screening for iron deficiency anemia  Comment:   Plan: CBC with platelets            Patient has been advised of split billing requirements and indicates understanding: Yes      COUNSELING:  Reviewed preventive health counseling, as reflected in patient instructions      BMI:   Estimated body mass index is 27.56 kg/m  as calculated from the following:    Height as of this encounter: 1.773 m (5' 9.8\").    Weight as of this encounter: 86.6 kg (191 lb).   Weight management plan: Discussed healthy diet and exercise guidelines      She reports that she has never smoked. She has never used smokeless tobacco.          SWAPNA [facility]  "

## 2024-01-14 RX ORDER — ROSUVASTATIN CALCIUM 20 MG/1
20 TABLET, COATED ORAL DAILY
Qty: 90 TABLET | Refills: 1 | Status: SHIPPED | OUTPATIENT
Start: 2024-01-14

## 2024-03-05 ENCOUNTER — OFFICE VISIT (OUTPATIENT)
Dept: SURGERY | Facility: CLINIC | Age: 49
End: 2024-03-05
Payer: COMMERCIAL

## 2024-03-05 VITALS
HEART RATE: 78 BPM | BODY MASS INDEX: 27.11 KG/M2 | SYSTOLIC BLOOD PRESSURE: 120 MMHG | OXYGEN SATURATION: 98 % | RESPIRATION RATE: 16 BRPM | WEIGHT: 183 LBS | HEIGHT: 69 IN | DIASTOLIC BLOOD PRESSURE: 80 MMHG

## 2024-03-05 DIAGNOSIS — Z91.89 AT HIGH RISK FOR BREAST CANCER: ICD-10-CM

## 2024-03-05 DIAGNOSIS — Z90.13 S/P BILATERAL MASTECTOMY: Primary | ICD-10-CM

## 2024-03-05 PROCEDURE — 99212 OFFICE O/P EST SF 10 MIN: CPT | Performed by: SURGERY

## 2024-03-05 NOTE — PROGRESS NOTES
[facility] Follow Up Note    CHIEF COMPLAINT:  History of bilateral mastectomies    HISTORY OF PRESENT ILLNESS:  Zayra Altamirano is a 48 year old female who is seen in follow up for history of bilateral prophylactic mastectomies.    She is status post prophylactic bilateral mastectomies with goldilocks reconstruction with Dr. Cano on 2023 due to increased lifetime risk of breast cancer.  She underwent revision of her reconstruction with fat grafting and scar revision with Dr. Clement on 2023.    Zayra reports she is doing well. She is going to see a massage therapist to help with her scarring and fibrosis on the right upper outer chest. She has excellent ROM. She denies any significant pain on her chest. Has improving sensation bilaterally.       Past Medical History:   Diagnosis Date    Congenital deficiency of other clotting factors     Factor 5 Leiden homozygous mutation    DVT of leg (deep venous thrombosis) (H) 2005    Embolism and thrombosis of unspecified site     left leg that traveled to become PE    Galactorrhea not associated with childbirth     elevated prolactin, Nl head CT    Hemorrhage of gastrointestinal tract, unspecified 3/09 ER    on coumadin    History of gestational diabetes     Homozygous Factor V Leiden mutation (H24)     Human papillomavirus in conditions classified elsewhere and of unspecified site     Other pulmonary embolism and infarction     6 [facility] COUMADIN.  change goal INR 1.3-2.0    Rosacea        Past Surgical History:   Procedure Laterality Date    C/SECTION, LOW TRANSVERSE  2008    , Low Transverse     SECTION  2012    Procedure: SECTION; Surgeon:ELY KRAMER; Location:[facility] L[facility]D    COLONOSCOPY N/A 2022    Procedure: COLONOSCOPY, WITH POLYPECTOMY ;  Surgeon: Leventhal, Thomas Michael, MD;  Location: [facility] OR    MASTECTOMY SIMPLE BILATERAL Bilateral 2023    Procedure:  bilateral skin sparing mastectomies;  Surgeon: Shayy Kebede MD;  Location: [facility] OR    MASTOPEXY BILATERAL Bilateral 2023    Procedure: bilateral breast reconstruction with mastopexy technique;  Surgeon: Ovidio Cano MD;  Location:  OR    ZZC EVENT MONITOR (CARDIAC - FL)  10/08    Normal/had palpatiations.     ZZHC COLPOSCOPY VULVA W BIOPSY         Family History   Problem Relation Age of Onset    Breast Cancer Mother 63        IDC , recurrent ; ER+    Hypertension Mother     Cerebrovascular Disease Mother         Ischemic stroke 3/18 at age 68.    Lymphoma Mother 70        follicular    Lipids Father     Hypertension Father     Circulatory Father         veins stripped     Unknown/Adopted Father         hyperlipidemia     Cataracts Father     Other - See Comments Father         bladder lesions    Blood Disease Sister         FACTOR 5 LEIDEN     Blood Disease Sister         FACTOR 5    Blood Disease Brother         FACTOR 5 LEIDEN HOMOZYGOUS    Breast Cancer Maternal Grandmother 58        contralateral at 74    Diabetes Maternal Grandmother         Type 2    Cerebrovascular Disease Maternal Grandmother     Cardiovascular Maternal Grandmother         MI    Hypertension Maternal Grandmother     Cancer Maternal Grandfather 68        esophageal or stomach?    Substance Abuse Paternal Grandfather     Breast Cancer Maternal Aunt 71    Breast Cancer Maternal Aunt 60    Lymphoma Maternal Aunt 70    Cancer Maternal Aunt 71        vulvar    Pulmonary Embolism Maternal Aunt         bilateral    Cancer Paternal Aunt          at 58; unknown type    Throat cancer Paternal Uncle 55    Brain Cancer Paternal Uncle 61         at 64    Lupus Paternal Uncle        Social History     Tobacco Use    Smoking status: Never    Smokeless tobacco: Never   Substance Use Topics    Alcohol use: Yes     Alcohol/week: 1.0 standard drink of alcohol     Types: 1 Standard drinks or equivalent per week      Comment: rare occ       Patient Active Problem List   Diagnosis    History of gestational diabetes    CARDIOVASCULAR SCREENING; LDL GOAL LESS THAN 160    Long term current use of anticoagulant therapy    Ankle pain, left    Ankle pain, right    Homozygous Factor V Leiden mutation (H24)    Anxiety about health    Abnormal maternal glucose tolerance, complicating pregnancy, childbirth, or the puerperium, unspecified as to episode of care    Activated protein C resistance (H24)    At high risk for breast cancer     Allergies   Allergen Reactions    Benadryl Allergy Other (See Comments)     Had paradoxical reaction - made her very hyper     Current Outpatient Medications   Medication Sig Dispense Refill    acetaminophen (TYLENOL) 500 MG tablet Take 500-1,000 mg by mouth every 6 hours as needed for mild pain      Ascorbic Acid (VITAMIN C) 500 MG CAPS       Biotin 10 MG CAPS       loratadine (CLARITIN) 10 MG tablet Take 10 mg by mouth daily as needed for allergies      rivaroxaban ANTICOAGULANT (XARELTO ANTICOAGULANT) 10 MG TABS tablet Take 1 tablet (10 mg) by mouth daily with food 90 tablet 3    rosuvastatin (CRESTOR) 20 MG tablet Take 1 tablet (20 mg) by mouth daily 90 tablet 1    VITAMIN D, CHOLECALCIFEROL, PO Take 1,000 Units by mouth daily       Vitals: LMP 01/01/2024 (Exact Date)   BMI= There is no height or weight on file to calculate BMI.    EXAM:  GENERAL: healthy, alert and no distress   BREAST: Breasts are surgically absent.  Goldilocks type reconstruction in place.  Scars are well-healed. Mild fibrosis palpable on the right lateral reconstructed chest compared to left side.   There is no axillary or supraclavicular lymphadenopathy.  CARDIOVASCULAR:  RRR  RESPIRATORY: nonlabored breathing  NECK: Neck supple. No adenopathy. Thyroid symmetric, normal size  SKIN: No suspicious lesions or rashes  LYMPH: Normal cervical lymph nodes      ASSESSMENT/PLAN:  Zayra ARIADNE Altamirano is 6 months status post bilateral prophylactic  mastectomies.  She has healed well.  There are no areas of concern on today's chest exam.  I will plan to see her back in 6 months for follow up 1 year post operative exam.       Shayy Kebede MD  [facility]  [phone]        Please route or send letter to:  Primary Care Provider (PCP) and Referring Provider

## 2024-04-22 ENCOUNTER — TRANSFERRED RECORDS (OUTPATIENT)
Dept: HEALTH INFORMATION MANAGEMENT | Facility: CLINIC | Age: 49
End: 2024-04-22
Payer: COMMERCIAL

## 2024-07-01 ENCOUNTER — MYC MEDICAL ADVICE (OUTPATIENT)
Dept: FAMILY MEDICINE | Facility: CLINIC | Age: 49
End: 2024-07-01
Payer: COMMERCIAL

## 2024-07-01 DIAGNOSIS — R10.2 PELVIC PAIN: Primary | ICD-10-CM

## 2024-07-10 ENCOUNTER — TELEPHONE (OUTPATIENT)
Dept: FAMILY MEDICINE | Facility: CLINIC | Age: 49
End: 2024-07-10
Payer: COMMERCIAL

## 2024-07-10 ENCOUNTER — ANCILLARY PROCEDURE (OUTPATIENT)
Dept: ULTRASOUND IMAGING | Facility: CLINIC | Age: 49
End: 2024-07-10
Attending: NURSE PRACTITIONER
Payer: COMMERCIAL

## 2024-07-10 DIAGNOSIS — R10.2 PELVIC PAIN: ICD-10-CM

## 2024-07-10 DIAGNOSIS — R10.2 PELVIC PAIN IN FEMALE: Primary | ICD-10-CM

## 2024-07-10 PROCEDURE — 76830 TRANSVAGINAL US NON-OB: CPT

## 2024-07-10 PROCEDURE — 76856 US EXAM PELVIC COMPLETE: CPT

## 2024-07-10 NOTE — TELEPHONE ENCOUNTER
RN - please call Zayra regarding her pelvic ultrasound results. The cause of her pain is unclear from the ultrasound. There is evidence of a possible fibroid and the lining of the uterus is 9mm which can be normal for a menstruating person, but abnormal if post menopausal. I recommend follow up with gynecology. I placed a gyn referral. Alejandra Chacko M.D.          US Pelvic Complete with Transvaginal  Narrative: ULTRASOUND PELVIS WITH TRANSVAGINAL IMAGING  7/10/2024 12:31 PM     HISTORY: Pelvic pain.    COMPARISON: CT abdomen and pelvis 2/6/2019.    FINDINGS:  Endovaginal sonography was added to the transabdominal  scans.    Partially exophytic heterogeneous mass of the left lower uterine  segment measuring 4.5 x 3.6 x 4.0 cm. The uterus measures 7.6 x 5.8 x  5.1 cm. Endometrial stripe measures 9 mm. The right ovary measures 2.9  x 2.3 x 1.8 cm. The left ovary measures 3 x 2.1 x 1.6 cm.  No adnexal  masses are present. Small volume free pelvic fluid is present.  Impression: IMPRESSION:   1. Left lower uterine segment heterogeneous mass most likely  reflecting a subserosal fibroid (4.5 cm).    2. Unremarkable appearance of the ovaries.    3. Endometrial thickness measures 9 mm. If the patient is  postmenopausal, this is considered abnormal and consider tissue  sampling.    4. Nonspecific small volume pelvic free fluid.    JOY HI MD         SYSTEM ID:  [id number]

## 2024-07-11 NOTE — TELEPHONE ENCOUNTER
Left message to call back and ask to speak with an available triage nurse.    PETER KellerN, RN-BC  [facility]

## 2024-07-12 NOTE — TELEPHONE ENCOUNTER
Writer called and spoke with patient regarding ultrasound. Patient agreed with referral for Ob/gyn.     DUY Montoya RN  [facility]

## 2024-08-08 ENCOUNTER — OFFICE VISIT (OUTPATIENT)
Dept: FAMILY MEDICINE | Facility: CLINIC | Age: 49
End: 2024-08-08
Payer: COMMERCIAL

## 2024-08-08 VITALS
TEMPERATURE: 98.1 F | HEART RATE: 80 BPM | RESPIRATION RATE: 16 BRPM | OXYGEN SATURATION: 99 % | WEIGHT: 196.4 LBS | BODY MASS INDEX: 28.12 KG/M2 | HEIGHT: 70 IN | DIASTOLIC BLOOD PRESSURE: 77 MMHG | SYSTOLIC BLOOD PRESSURE: 124 MMHG

## 2024-08-08 DIAGNOSIS — R10.2 PELVIC PAIN IN FEMALE: Primary | ICD-10-CM

## 2024-08-08 PROCEDURE — 99213 OFFICE O/P EST LOW 20 MIN: CPT | Performed by: NURSE PRACTITIONER

## 2024-08-08 ASSESSMENT — PAIN SCALES - GENERAL: PAINLEVEL: NO PAIN (0)

## 2024-09-04 ENCOUNTER — APPOINTMENT (OUTPATIENT)
Dept: GENERAL RADIOLOGY | Facility: CLINIC | Age: 49
End: 2024-09-04
Attending: EMERGENCY MEDICINE
Payer: COMMERCIAL

## 2024-09-04 ENCOUNTER — HOSPITAL ENCOUNTER (EMERGENCY)
Facility: CLINIC | Age: 49
Discharge: HOME | End: 2024-09-04
Attending: EMERGENCY MEDICINE | Admitting: EMERGENCY MEDICINE
Payer: COMMERCIAL

## 2024-09-04 VITALS
OXYGEN SATURATION: 99 % | TEMPERATURE: 99.4 F | RESPIRATION RATE: 18 BRPM | DIASTOLIC BLOOD PRESSURE: 57 MMHG | HEART RATE: 75 BPM | HEIGHT: 70 IN | BODY MASS INDEX: 27.92 KG/M2 | WEIGHT: 195 LBS | SYSTOLIC BLOOD PRESSURE: 124 MMHG

## 2024-09-04 DIAGNOSIS — R07.89 ATYPICAL CHEST PAIN: ICD-10-CM

## 2024-09-04 LAB
ANION GAP SERPL CALCULATED.3IONS-SCNC: 9 MMOL/L (ref 7–15)
ATRIAL RATE - MUSE: 99 BPM
BASOPHILS # BLD AUTO: 0.1 10E3/UL (ref 0–0.2)
BASOPHILS NFR BLD AUTO: 1 %
BUN SERPL-MCNC: 11.4 MG/DL (ref 6–20)
CALCIUM SERPL-MCNC: 9.3 MG/DL (ref 8.8–10.4)
CHLORIDE SERPL-SCNC: 107 MMOL/L (ref 98–107)
CREAT SERPL-MCNC: 0.84 MG/DL (ref 0.51–0.95)
D DIMER PPP FEU-MCNC: 0.48 UG/ML FEU (ref 0–0.5)
DIASTOLIC BLOOD PRESSURE - MUSE: NORMAL MMHG
EGFRCR SERPLBLD CKD-EPI 2021: 84 ML/MIN/1.73M2
EOSINOPHIL # BLD AUTO: 0.1 10E3/UL (ref 0–0.7)
EOSINOPHIL NFR BLD AUTO: 1 %
ERYTHROCYTE [DISTWIDTH] IN BLOOD BY AUTOMATED COUNT: 12.7 % (ref 10–15)
GLUCOSE SERPL-MCNC: 105 MG/DL (ref 70–99)
HCO3 SERPL-SCNC: 23 MMOL/L (ref 22–29)
HCT VFR BLD AUTO: 45.4 % (ref 35–47)
HGB BLD-MCNC: 15.6 G/DL (ref 11.7–15.7)
IMM GRANULOCYTES # BLD: 0 10E3/UL
IMM GRANULOCYTES NFR BLD: 0 %
INTERPRETATION ECG - MUSE: NORMAL
LYMPHOCYTES # BLD AUTO: 1.9 10E3/UL (ref 0.8–5.3)
LYMPHOCYTES NFR BLD AUTO: 16 %
MCH RBC QN AUTO: 31 PG (ref 26.5–33)
MCHC RBC AUTO-ENTMCNC: 34.4 G/DL (ref 31.5–36.5)
MCV RBC AUTO: 90 FL (ref 78–100)
MONOCYTES # BLD AUTO: 0.4 10E3/UL (ref 0–1.3)
MONOCYTES NFR BLD AUTO: 4 %
NEUTROPHILS # BLD AUTO: 8.9 10E3/UL (ref 1.6–8.3)
NEUTROPHILS NFR BLD AUTO: 78 %
NRBC # BLD AUTO: 0 10E3/UL
NRBC BLD AUTO-RTO: 0 /100
P AXIS - MUSE: 78 DEGREES
PLATELET # BLD AUTO: 318 10E3/UL (ref 150–450)
POTASSIUM SERPL-SCNC: 4.4 MMOL/L (ref 3.4–5.3)
PR INTERVAL - MUSE: 152 MS
QRS DURATION - MUSE: 80 MS
QT - MUSE: 336 MS
QTC - MUSE: 431 MS
R AXIS - MUSE: 93 DEGREES
RBC # BLD AUTO: 5.03 10E6/UL (ref 3.8–5.2)
SODIUM SERPL-SCNC: 139 MMOL/L (ref 135–145)
SYSTOLIC BLOOD PRESSURE - MUSE: NORMAL MMHG
T AXIS - MUSE: 48 DEGREES
TROPONIN T SERPL HS-MCNC: <6 NG/L
VENTRICULAR RATE- MUSE: 99 BPM
WBC # BLD AUTO: 11.3 10E3/UL (ref 4–11)

## 2024-09-04 PROCEDURE — 80048 BASIC METABOLIC PNL TOTAL CA: CPT | Performed by: EMERGENCY MEDICINE

## 2024-09-04 PROCEDURE — 85379 FIBRIN DEGRADATION QUANT: CPT | Performed by: EMERGENCY MEDICINE

## 2024-09-04 PROCEDURE — 93005 ELECTROCARDIOGRAM TRACING: CPT

## 2024-09-04 PROCEDURE — 84484 ASSAY OF TROPONIN QUANT: CPT | Performed by: EMERGENCY MEDICINE

## 2024-09-04 PROCEDURE — 71046 X-RAY EXAM CHEST 2 VIEWS: CPT

## 2024-09-04 PROCEDURE — 36415 COLL VENOUS BLD VENIPUNCTURE: CPT | Performed by: EMERGENCY MEDICINE

## 2024-09-04 PROCEDURE — 99285 EMERGENCY DEPT VISIT HI MDM: CPT | Mod: 25

## 2024-09-04 PROCEDURE — 85025 COMPLETE CBC W/AUTO DIFF WBC: CPT | Performed by: EMERGENCY MEDICINE

## 2024-09-04 ASSESSMENT — ACTIVITIES OF DAILY LIVING (ADL)
ADLS_ACUITY_SCORE: 37

## 2024-09-04 NOTE — ED TRIAGE NOTES
Mid sternal chest pain wrapping around to left side, woke pt up from sleep at 0300. Denies cardiac hx. Pt endorses diaphoresis and nausea during episode, pt reports pain has improved since episode.      Triage Assessment (Adult)       Row Name 09/04/24 1022          Triage Assessment    Airway WDL WDL        Cognitive/Neuro/Behavioral WDL    Cognitive/Neuro/Behavioral WDL WDL

## 2024-09-04 NOTE — ED PROVIDER NOTES
Emergency Department Note      History of Present Illness     Chief Complaint   Chest Pain    HPI   Zayra Altamirano is a 49 year old female on Xarelto with a history of breast cancer, PE, and diabetes mellitus presenting with chest pain that started at 0330 this morning. Her pain woke her up from sleep. She initially attributed her symptoms to musculoskeletal soreness. She sat up in bed and noticed left sided pain that she felt in the back as well. She notes the pain was more sharp with inspiratory breathing, but denies burning or stabbing pain. Movement made the pain slightly worse. The patient tried to use the bathroom with no relief. She then woke up her  and felt nauseous at the time. Zayra was breathing normally at the time, but notes her heart rate was elevated. She reports mild shortness of breath and nausea. The patient denies fever, chills, recent illness, abdominal pain, jaw pain, leg pain or leg swelling. The patient is active with no history of smoking. Zayra notes history of a double mastectomy last summer. No recent missed doses of Xarelto.    Independent Historian    as detailed above.    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  Hemorrhage of gastrointestinal tract, unspecified  History of gestational diabetes  Homozygous Factor V Leiden mutation   Human papillomavirus in conditions classified elsewhere and of unspecified site  Pulmonary embolism and infarction  PONV  Rosacea    Medications   Rivaroxaban   Rosuvastatin     Surgical History   C section   Colonoscopy   Mastectomy   Mastopexy, bilateral   Colpscopy     Physical Exam     Patient Vitals for the past 24 hrs:   BP Temp Temp src Pulse Resp SpO2 Height Weight  "  09/04/24 1709 124/57 -- -- 75 18 99 % -- --   09/04/24 1022 129/72 99.4  F (37.4  C) Temporal 88 16 99 % 1.778 m (5' 10\") 88.5 kg (195 lb)     Physical Exam  General: Well appearing, nontoxic. Resting comfortably  Head:  Scalp, face, and head appear normal  Eyes:  Pupils are equal, round    Conjunctivae non-injected and sclerae white  ENT:    The external nose is normal    Pinnae are normal  Neck:  Normal range of motion    There is no rigidity noted    Trachea is in the midline  CV:  Regular rate and rhythm     Normal S1/S2, no S3/S4    No murmur or rub. Radial pulses 2+ bilaterally.  Resp:  Lungs are clear and equal bilaterally  There is no tachypnea    No increased work of breathing    No rales, wheezing, or rhonchi  GI:  Abdomen is soft, no rigidity or guarding    No distension, or mass    No tenderness or rebound tenderness   MS:  Normal muscular tone. Chest wall non tender to palpation.    Symmetric motor strength    No lower extremity edema. No calf swelling or tenderness.  Skin:  No rash or acute skin lesions noted  Neuro:  Awake and alert  Speech is normal and fluent  Moves all extremities spontaneously  Psych: Normal affect. Appropriate interactions.    Diagnostics     Lab Results   Labs Ordered and Resulted from Time of ED Arrival to Time of ED Departure   BASIC METABOLIC PANEL - Abnormal       Result Value    Sodium 139      Potassium 4.4      Chloride 107      Carbon Dioxide (CO2) 23      Anion Gap 9      Urea Nitrogen 11.4      Creatinine 0.84      GFR Estimate 84      Calcium 9.3      Glucose 105 (*)    CBC WITH PLATELETS AND DIFFERENTIAL - Abnormal    WBC Count 11.3 (*)     RBC Count 5.03      Hemoglobin 15.6      Hematocrit 45.4      MCV 90      MCH 31.0      MCHC 34.4      RDW 12.7      Platelet Count 318      % Neutrophils 78      % Lymphocytes 16      % Monocytes 4      % Eosinophils 1      % Basophils 1      % Immature Granulocytes 0      NRBCs per 100 WBC 0      Absolute Neutrophils 8.9 (*)  "    Absolute Lymphocytes 1.9      Absolute Monocytes 0.4      Absolute Eosinophils 0.1      Absolute Basophils 0.1      Absolute Immature Granulocytes 0.0      Absolute NRBCs 0.0     TROPONIN T, HIGH SENSITIVITY - Normal    Troponin T, High Sensitivity <6     D DIMER QUANTITATIVE - Normal    D-Dimer Quantitative 0.48       Imaging   XR Chest 2 Views   Final Result   IMPRESSION:  There are no acute infiltrates. The cardiac silhouette is   not enlarged. Pulmonary vasculature is unremarkable.       LEANNA MONCADA MD            SYSTEM ID:  [id number]        EKG   ECG results from 09/04/24   EKG 12 lead     Value    Systolic Blood Pressure     Diastolic Blood Pressure     Ventricular Rate 99    Atrial Rate 99    CT Interval 152    QRS Duration 80        QTc 431    P Axis 78    R AXIS 93    T Axis 48    Interpretation ECG      Sinus rhythm with sinus arrhythmia  Rightward axis  Borderline ECG  When compared with ECG of 3/14/23,  New rightward axis  EKG interpreted by me at 1210       Independent Interpretation   On my independent interpretation of the patient's chest radiograph(s) there is no pneumothorax or large pleural effusions.     ED Course      Medications Administered   Medications - No data to display    Procedures   None    Discussion of Management   None    ED Course   ED Course as of 09/04/24 1959   Wed Sep 04, 2024   1210 I obtained the history and examined the patient as noted above.      1717 I rechecked and updated the patient.        Additional Documentation  None    Medical Decision Making / Diagnosis     CMS Diagnoses: None    MIPS       None    MDM   Zayra Altamirano is a 49 year old female who presents for evaluation of atypical chest pain.  Pain is not exertional and mild.  On my evaluation she is well-appearing, hemodynamically stable.  A broad differential diagnosis is considered including acute coronary syndrome, pulmonary embolism, pleurisy, pericarditis, myocarditis, pneumonia, pneumothorax,  pleural effusion, pulmonary edema, thoracic mass, among others.  Patient denies any trauma.  Patient is low risk for ACS. Symptoms have been present for >12 hours.  Work-up in the ED is thankfully reassuring.  EKG does not show any acute ischemic changes and is without any evidence for dysrhythmia.  Clinical signs and symptoms are not consistent with pulmonary embolism she has no tachycardia, hypoxia. DDimer negative. Chest x-ray was obtained and was unremarkable without any evidence of any of the above pathologies.  Troponin is undetectable.  The patient's symptoms are not consistent with a serious underlying primary cardiopulmonary process or other serious etiology. No evidence to suggest acute aortic emergency. It is felt most likely to be musculoskeletal. Findings and plan of care were discussed with the patient.  Patient is agreeable to plan of care.  I feel that based on the ED evaluation and reassuring findings the patient is safe for discharge and close outpatient follow-up.  Close return precautions were provided to the patient.  she should return immediately or present to the closest ED or call 911 for any worsening. The patient was agreeable to plan of care and she was discharged in stable condition.      Disposition   The patient was discharged.     Diagnosis     ICD-10-CM    1. Atypical chest pain  R07.89          Discharge Medications   Discharge Medication List as of 9/4/2024  5:18 PM        Scribe Disclosure:  I, Rachel Beck, am serving as a scribe at 12:09 PM on 9/4/2024 to document services personally performed by Miguel Ybarra MD based on my observations and the provider's statements to me.        Miguel Ybarra MD  09/04/24 2002

## 2024-11-18 ENCOUNTER — MYC MEDICAL ADVICE (OUTPATIENT)
Dept: FAMILY MEDICINE | Facility: CLINIC | Age: 49
End: 2024-11-18
Payer: COMMERCIAL

## 2024-11-22 ENCOUNTER — OFFICE VISIT (OUTPATIENT)
Dept: ORTHOPEDICS | Facility: CLINIC | Age: 49
End: 2024-11-22
Payer: COMMERCIAL

## 2024-11-22 ENCOUNTER — ANCILLARY PROCEDURE (OUTPATIENT)
Dept: GENERAL RADIOLOGY | Facility: CLINIC | Age: 49
End: 2024-11-22
Attending: FAMILY MEDICINE
Payer: COMMERCIAL

## 2024-11-22 VITALS — HEIGHT: 70 IN | BODY MASS INDEX: 28.35 KG/M2 | WEIGHT: 198 LBS

## 2024-11-22 DIAGNOSIS — G89.29 CHRONIC PAIN OF LEFT KNEE: ICD-10-CM

## 2024-11-22 DIAGNOSIS — M22.2X2 PATELLOFEMORAL PAIN SYNDROME OF LEFT KNEE: ICD-10-CM

## 2024-11-22 DIAGNOSIS — M25.562 CHRONIC PAIN OF LEFT KNEE: ICD-10-CM

## 2024-11-22 DIAGNOSIS — G89.29 CHRONIC PAIN OF LEFT KNEE: Primary | ICD-10-CM

## 2024-11-22 DIAGNOSIS — M25.562 CHRONIC PAIN OF LEFT KNEE: Primary | ICD-10-CM

## 2024-11-22 PROCEDURE — 73562 X-RAY EXAM OF KNEE 3: CPT | Mod: TC | Performed by: INTERNAL MEDICINE

## 2024-11-22 PROCEDURE — 99204 OFFICE O/P NEW MOD 45 MIN: CPT | Performed by: FAMILY MEDICINE

## 2024-11-22 NOTE — PROGRESS NOTES
Zayra Altamirano  :  1975  DOS: 2024  MRN: [id number]    Sports Medicine Clinic Visit    PCP: Allie Cotter    Zayra Altamirano is a 49 year old female who is seen as a self referral presenting with left knee pain.    Injury: Gradual onset of pain over the past 6 months that initially started after sitting for prolonged period.  Pain located over left anterior medial knee, nonradiating.  Additional Features:  Positive: swelling and joint stiffness.  Symptoms are better with activity modification.  Symptoms are worse with: prolonged walking, sitting with knees bent.  Other evaluation and/or treatments so far consists of: Ice, Tylenol, and Rest.  Recent imaging completed: No recent imaging completed.  Prior History of related problems: none    Social History: currently employed as  , walks 3 miles, 3 - 4 days/week    Review of Systems  Musculoskeletal: as above  Remainder of review of systems is negative including constitutional, CV, pulmonary, GI, Skin and Neurologic except as noted in HPI or medical history.    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  change goal INR 1.3-2.0    Rosacea      Past Surgical History:   Procedure Laterality Date    C/SECTION, LOW TRANSVERSE  2008    , Low Transverse     SECTION  2012    Procedure:  SECTION; Surgeon:ELY KRAMER; [facility]    COLONOSCOPY N/A 2022    Procedure: COLONOSCOPY, WITH POLYPECTOMY ;  Surgeon: Leventhal, Thomas Michael, MD;  Location: [facility] OR    MASTECTOMY SIMPLE BILATERAL Bilateral 2023    Procedure: bilateral skin sparing mastectomies;  Surgeon: Shayy Kebede MD;  Location: [facility]    MASTOPEXY BILATERAL Bilateral 2023    Procedure: bilateral breast reconstruction with mastopexy technique;  Surgeon: Ovidio Cano MD;  Location:  OR    [facility] EVENT MONITOR (CARDIAC - [place])  10/08    Normal/had palpatiations.     ZZHC COLPOSCOPY VULVA W BIOPSY       Family History   Problem Relation Age of Onset    Breast Cancer Mother 63        IDC , recurrent ; ER+    Hypertension Mother     Cerebrovascular Disease Mother         Ischemic stroke 3/18 at age 68.    Lymphoma Mother 70        follicular    Lipids Father     Hypertension Father     Circulatory Father         veins stripped     Unknown/Adopted Father         hyperlipidemia     Cataracts Father     Other - See Comments Father         bladder lesions    Blood Disease Sister         FACTOR 5 LEIDEN     Blood Disease Sister         FACTOR 5    Blood Disease Brother         FACTOR 5 LEIDEN HOMOZYGOUS    Breast Cancer Maternal Grandmother 58        contralateral at 74    Diabetes Maternal Grandmother         Type 2    Cerebrovascular Disease Maternal Grandmother     Cardiovascular Maternal Grandmother         MI    Hypertension Maternal Grandmother     Cancer Maternal Grandfather 68        esophageal or stomach?    Substance Abuse Paternal Grandfather     Breast Cancer Maternal Aunt 71    Breast Cancer Maternal Aunt 60    Lymphoma Maternal Aunt 70    Cancer Maternal Aunt 71        vulvar    Pulmonary Embolism Maternal Aunt         bilateral    Cancer Paternal Aunt          at 58; unknown type    Throat cancer Paternal Uncle 55    Brain Cancer Paternal Uncle 61         at 64     "Lupus Paternal Uncle        Objective  Ht 1.778 m (5' 10\")   Wt 89.8 kg (198 lb)   BMI 28.41 kg/m      General: healthy, alert and in no distress    HEENT: no scleral icterus or conjunctival erythema   Skin: no suspicious lesions or rash. No jaundice.   CV: regular rhythm by palpation, 2+ distal pulses, no pedal edema    Resp: normal respiratory effort without conversational dyspnea   Psych: normal mood and affect    Gait: ***antalgic, appropriate coordination and balance   Neuro: normal light touch sensory exam of the extremities. Motor strength as noted below     {RIGHT/LEFT:704786::\"Bilateral\"} Knee exam    ROM:   {FSOC ROM:017533::\"     Full active and passive ROM with flexion and extension\"}    Inspection:  {knee:561114::\"     no visible ecchymosis\",\"     no visible edema or effusion\"}    Skin:  {skin:060045}    Patellar Motion:   {Patella motion:271277::\"     Normal patellar tracking noted through range of motion\"}    Tender:   {Tenderness:101380}    Non Tender:   {non tender:084515::\"      remainder of knee area\"}    Special Tests:   {Special tests:720023}    Evaluation of ipsilateral kinetic chain  {ipsilateral kinetic:310803::\"     normal strength with hip extension and abduction\",\"     normal strength with single leg squat\",\"     normal medial longitudinal arch in both feet\"}      Radiology  ***    Assessment:  No diagnosis found.    Plan:  Discussed the assessment with the patient.  {[facility] Plan:158320::\"Follow up: ***\"}          Disclaimer: This note consists of symbols derived from keyboarding, dictation and/or voice recognition software. As a result, there may be errors in the script that have gone undetected. Please consider this when interpreting information found in this chart.  " Joint spaces are preserved. No left  knee joint effusion.    EDMUNDO LOCKE MD         SYSTEM ID:  [id number]       Assessment:  1. Chronic pain of left knee    2. Patellofemoral pain syndrome of left knee        Plan:  Discussed the assessment with the patient.  Follow up: 1-2 mo prn based on progress  Start PT, signs of PFS  Apply HEP to both legs, work on overall core stability  Need to work on hip and knee stabilization as well reviewed, low impact exercise principles reviewed  Use of short 1-2 wk course of NSAIDs reviewed, dosing and precautions reviewed if utilized  Oral Tylenol and topical Voltaren gel reviewed as safe OTC options, reviewed safe dosing strategies  RICE reviewed, as well as potential brace usage and principles of appropriate brace usage for comfort   Painfree activity ok, limit squatting, lunging, jumping, stairs  We discussed modified progressive pain-free activity as tolerated  XR images independently visualized and reviewed with patient today in clinic  Very mild early degenerative changes, no concerning acute pathology  Home handouts provided and supportive care reviewed  All questions were answered today  Contact us with additional questions or concerns  Signs and sx of concern reviewed      Michael Damon DO, LEATHA  Sports Medicine Physician  [facility]          Disclaimer: This note consists of symbols derived from keyboarding, dictation and/or voice recognition software. As a result, there may be errors in the script that have gone undetected. Please consider this when interpreting information found in this chart.

## 2024-11-22 NOTE — LETTER
2024      Zayra Altamirano  [address] [facility] [address]      Dear Colleague,    Thank you for referring your patient, Zayra Altamirano, to the [facility]. Please see a copy of my visit note below.    Zayra Altamirano  :  1975  DOS: 2024  MRN: [id number]    Sports Medicine Clinic Visit    PCP: Allie Cotter    Zayra Altamirano is a 49 year old female who is seen as a self referral presenting with left knee pain.    Injury: Gradual onset of pain over the past 6 months that initially started after sitting for prolonged period.  Pain located over left anterior medial knee, nonradiating.  Additional Features:  Positive: swelling and joint stiffness.  Symptoms are better with activity modification.  Symptoms are worse with: prolonged walking, sitting with knees bent.  Other evaluation and/or treatments so far consists of: Ice, Tylenol, and Rest.  Recent imaging completed: No recent imaging completed.  Prior History of related problems: none    Social History: currently employed as  , walks 3 miles, 3 - 4 days/week    Review of Systems  Musculoskeletal: as above  Remainder of review of systems is negative including constitutional, CV, pulmonary, GI, Skin and Neurologic except as noted in HPI or medical history.    Past Medical History:   Diagnosis Date     Congenital deficiency of other clotting factors     Factor 5 Leiden homozygous mutation     DVT of leg (deep venous thrombosis) (H) 2005     Embolism and thrombosis of unspecified site     left leg that traveled to become PE     Galactorrhea not associated with childbirth     elevated prolactin, Nl head CT     Hemorrhage of gastrointestinal tract, unspecified 3/09 ER    on coumadin     History of gestational diabetes      Homozygous Factor V Leiden mutation (H)      Human papillomavirus in conditions classified elsewhere and of unspecified site      Other pulmonary embolism  and infarction     6 [facility] COUMADIN.  change goal INR 1.3-2.0     Rosacea      Past Surgical History:   Procedure Laterality Date     C/SECTION, LOW TRANSVERSE  2008    , Low Transverse      SECTION  2012    Procedure: SECTION; Surgeon:ELY KRAMER; [facility]     COLONOSCOPY N/A 2022    Procedure: COLONOSCOPY, WITH POLYPECTOMY ;  Surgeon: Leventhal, Thomas Michael, MD;  Location: [facility] OR     MASTECTOMY SIMPLE BILATERAL Bilateral 2023    Procedure: bilateral skin sparing mastectomies;  Surgeon: Shayy Kebede MD;  Location: [facility]     MASTOPEXY BILATERAL Bilateral 2023    Procedure: bilateral breast reconstruction with mastopexy technique;  Surgeon: Ovidio Cano MD;  Location:  OR     [facility] EVENT MONITOR ([place] - [place]  10/08    Normal/had palpatiations.      ZZHC COLPOSCOPY VULVA W BIOPSY       Family History   Problem Relation Age of Onset     Breast Cancer Mother 63        IDC , recurrent ; ER+     Hypertension Mother      Cerebrovascular Disease Mother         Ischemic stroke 3/18 at age 68.     Lymphoma Mother 70        follicular     Lipids Father      Hypertension Father      Circulatory Father         veins stripped      Unknown/Adopted Father         hyperlipidemia      Cataracts Father      Other - See Comments Father         bladder lesions     Blood Disease Sister         FACTOR 5 LEIDEN      Blood Disease Sister         FACTOR 5     Blood Disease Brother         FACTOR 5 LEIDEN HOMOZYGOUS     Breast Cancer Maternal Grandmother 58        contralateral at 74     Diabetes Maternal Grandmother         Type 2     Cerebrovascular Disease Maternal Grandmother      Cardiovascular Maternal Grandmother         MI     Hypertension Maternal Grandmother      Cancer Maternal Grandfather 68        esophageal or stomach?     Substance Abuse Paternal Grandfather      Breast Cancer Maternal Aunt 71     Breast Cancer  "Maternal Aunt 60     Lymphoma Maternal Aunt 70     Cancer Maternal Aunt 71        vulvar     Pulmonary Embolism Maternal Aunt         bilateral     Cancer Paternal Aunt          at 58; unknown type     Throat cancer Paternal Uncle 55     Brain Cancer Paternal Uncle 61         at 64     Lupus Paternal Uncle        Objective  Ht 1.778 m (5' 10\")   Wt 89.8 kg (198 lb)   BMI 28.41 kg/m      General: healthy, alert and in no distress    HEENT: no scleral icterus or conjunctival erythema   Skin: no suspicious lesions or rash. No jaundice.   CV: regular rhythm by palpation, 2+ distal pulses, no pedal edema    Resp: normal respiratory effort without conversational dyspnea   Psych: normal mood and affect    Gait: nonantalgic, appropriate coordination and balance   Neuro: normal light touch sensory exam of the extremities. Motor strength as noted below     Left Knee exam    ROM:        Flexion 140 degrees       Extension 0 degrees       Range of motion limited by mild pain in terminal flexion    Inspection:       no visible ecchymosis        effusion noted trace    Skin:       no visible deformities       well perfused       capillary refill brisk    Patellar Motion:        Lateral tilt noted in patella       Crepitus noted in the patellofemoral joint       + J Sign    Tender:        lateral patellar border       medial joint line mild    Non Tender:         remainder of knee area        medial patellar border        lateral joint line        along MCL        distal IT Band        infrapatellar tendon        tibial tubercle       pes anserine bursa    Special Tests:        neg (-) Demetrice       neg (-) Lachmans       neg (-) anterior drawer       neg (-) posterior drawer      Mildly painful PF grind       neg (-) varus at 0 deg and 30 deg       neg (-) valgus at 0 deg and 30 deg    Evaluation of ipsilateral kinetic chain       Modest core stability, no focal strength deficits    Radiology  Recent Results (from the " past 744 hours)   XR Knee Standing AP [employer] Bilat Lat Left    Narrative    XR KNEE STANDING AP [place] BILAT LAT LEFT  11/22/2024 10:50 AM     HISTORY: Chronic pain of left knee; Chronic pain of left knee  COMPARISON: None      Impression    IMPRESSION:  No acute fracture or subluxation. Joint spaces are preserved. No left  knee joint effusion.    EDMUNDO LOCKE MD         SYSTEM ID:  [id number]       Assessment:  1. Chronic pain of left knee    2. Patellofemoral pain syndrome of left knee        Plan:  Discussed the assessment with the patient.  Follow up: 1-2 mo prn based on progress  Start PT, signs of PFS  Apply HEP to both legs, work on overall core stability  Need to work on hip and knee stabilization as well reviewed, low impact exercise principles reviewed  Use of short 1-2 wk course of NSAIDs reviewed, dosing and precautions reviewed if utilized  Oral Tylenol and topical Voltaren gel reviewed as safe OTC options, reviewed safe dosing strategies  RICE reviewed, as well as potential brace usage and principles of appropriate brace usage for comfort   Painfree activity ok, limit squatting, lunging, jumping, stairs  We discussed modified progressive pain-free activity as tolerated  XR images independently visualized and reviewed with patient today in clinic  Very mild early degenerative changes, no concerning acute pathology  Home handouts provided and supportive care reviewed  All questions were answered today  Contact us with additional questions or concerns  Signs and sx of concern reviewed      Michael Damon DO, LEATHA  Sports Medicine Physician  [facility]          Disclaimer: This note consists of symbols derived from keyboarding, dictation and/or voice recognition software. As a result, there may be errors in the script that have gone undetected. Please consider this when interpreting information found in this chart.      Again, thank you for allowing me to  participate in the care of your patient.        Sincerely,        Michael Damon, DO

## 2024-12-02 ENCOUNTER — PATIENT OUTREACH (OUTPATIENT)
Dept: CARE COORDINATION | Facility: CLINIC | Age: 49
End: 2024-12-02
Payer: COMMERCIAL

## 2024-12-09 ENCOUNTER — OFFICE VISIT (OUTPATIENT)
Dept: OBGYN | Facility: CLINIC | Age: 49
End: 2024-12-09
Attending: OBSTETRICS & GYNECOLOGY
Payer: COMMERCIAL

## 2024-12-09 VITALS
BODY MASS INDEX: 28.41 KG/M2 | WEIGHT: 198 LBS | DIASTOLIC BLOOD PRESSURE: 82 MMHG | SYSTOLIC BLOOD PRESSURE: 118 MMHG | HEART RATE: 86 BPM

## 2024-12-09 DIAGNOSIS — R10.2 PELVIC PAIN IN FEMALE: Primary | ICD-10-CM

## 2024-12-09 RX ORDER — ESTRADIOL 0.1 MG/G
2 CREAM VAGINAL
Qty: 42.5 G | Refills: 11 | Status: SHIPPED | OUTPATIENT
Start: 2024-12-09

## 2024-12-09 NOTE — Clinical Note
12/9/2024       RE: Zayra Altamirano  [address] [facility] [address]     Dear Colleague,    Thank you for referring your patient, Zayra Altamirano, to the [facility] [place] at [facility]. Please see a copy of my visit note below.    Chief Complaint   Patient presents with    Follow Up     Possible cyst    Karla Bustillos LPN     Right sided pain every other month-day 12-19 of cycle   Fibroid on the left     Xarelto                          Again, thank you for allowing me to participate in the care of your patient.      Sincerely,    Tatum Bartlett MD

## 2024-12-09 NOTE — PROGRESS NOTES
Right sided pain every other month-day 12-19 of cycle   Fibroid on the left     Xarelto

## 2024-12-09 NOTE — PATIENT INSTRUCTIONS
Thank you for trusting us with your care!   Please be aware, if you are on Mychart, you may see your results prior to your providers review. If labs are abnormal, we will call or message you on cinvolvet with a follow up plan.    If you need to contact us for questions about:  Symptoms, Scheduling & Medical Questions; Non-urgent (2-3 day response) Yoomly message, Urgent (needing response today) [phone] (if after 3:30pm next day response)   Prescriptions: Please call your Pharmacy   Billing: Joey [phone] or [facility]:[phone]

## 2024-12-11 ENCOUNTER — ANCILLARY PROCEDURE (OUTPATIENT)
Dept: ULTRASOUND IMAGING | Facility: CLINIC | Age: 49
End: 2024-12-11
Attending: OBSTETRICS & GYNECOLOGY
Payer: COMMERCIAL

## 2024-12-11 DIAGNOSIS — R10.2 PELVIC PAIN IN FEMALE: ICD-10-CM

## 2024-12-11 PROCEDURE — 76856 US EXAM PELVIC COMPLETE: CPT | Mod: 26 | Performed by: OBSTETRICS & GYNECOLOGY

## 2024-12-11 PROCEDURE — 76830 TRANSVAGINAL US NON-OB: CPT

## 2024-12-11 PROCEDURE — 76856 US EXAM PELVIC COMPLETE: CPT

## 2024-12-11 PROCEDURE — 76830 TRANSVAGINAL US NON-OB: CPT | Mod: 26 | Performed by: OBSTETRICS & GYNECOLOGY

## 2024-12-16 ENCOUNTER — TELEPHONE (OUTPATIENT)
Dept: HEMATOLOGY | Facility: CLINIC | Age: 49
End: 2024-12-16
Payer: COMMERCIAL

## 2024-12-18 ENCOUNTER — THERAPY VISIT (OUTPATIENT)
Dept: PHYSICAL THERAPY | Facility: CLINIC | Age: 49
End: 2024-12-18
Payer: COMMERCIAL

## 2024-12-18 DIAGNOSIS — G89.29 CHRONIC PAIN OF LEFT KNEE: Primary | ICD-10-CM

## 2024-12-18 DIAGNOSIS — M22.2X2 PATELLOFEMORAL PAIN SYNDROME OF LEFT KNEE: ICD-10-CM

## 2024-12-18 DIAGNOSIS — M25.562 CHRONIC PAIN OF LEFT KNEE: Primary | ICD-10-CM

## 2024-12-18 PROCEDURE — 97110 THERAPEUTIC EXERCISES: CPT | Mod: GP | Performed by: PHYSICAL THERAPIST

## 2024-12-18 PROCEDURE — 97112 NEUROMUSCULAR REEDUCATION: CPT | Mod: GP | Performed by: PHYSICAL THERAPIST

## 2025-01-09 ENCOUNTER — OFFICE VISIT (OUTPATIENT)
Dept: OBGYN | Facility: CLINIC | Age: 50
End: 2025-01-09
Attending: OBSTETRICS & GYNECOLOGY
Payer: COMMERCIAL

## 2025-01-09 VITALS
HEART RATE: 76 BPM | WEIGHT: 202 LBS | SYSTOLIC BLOOD PRESSURE: 121 MMHG | DIASTOLIC BLOOD PRESSURE: 83 MMHG | BODY MASS INDEX: 28.98 KG/M2

## 2025-01-09 DIAGNOSIS — R10.2 PELVIC PAIN IN FEMALE: Primary | ICD-10-CM

## 2025-01-09 DIAGNOSIS — Z12.4 SCREENING FOR CERVICAL CANCER: ICD-10-CM

## 2025-01-09 PROCEDURE — G0145 SCR C/V CYTO,THINLAYER,RESCR: HCPCS | Performed by: OBSTETRICS & GYNECOLOGY

## 2025-01-09 PROCEDURE — 87624 HPV HI-RISK TYP POOLED RSLT: CPT | Performed by: OBSTETRICS & GYNECOLOGY

## 2025-01-09 PROCEDURE — 99213 OFFICE O/P EST LOW 20 MIN: CPT | Performed by: OBSTETRICS & GYNECOLOGY

## 2025-01-09 NOTE — PATIENT INSTRUCTIONS
Thank you for trusting us with your care!   Please be aware, if you are on Mychart, you may see your results prior to your providers review. If labs are abnormal, we will call or message you on GluMetricst with a follow up plan.    If you need to contact us for questions about:  Symptoms, Scheduling & Medical Questions; Non-urgent (2-3 day response) Sc[employer] message, Urgent (needing response today) [phone] (if after 3:30pm next day response)   Prescriptions: Please call your Pharmacy   Billing: Joey [phone] or [facility]:[phone]

## 2025-01-09 NOTE — LETTER
1/9/2025       RE: Zayra Altamirano  [address] [facility] [address]     Dear Colleague,    Thank you for referring your patient, Zayra Altamirano, to the [facility] [place] at [facility]. Please see a copy of my visit note below.    Chief Complaint   Patient presents with     RECHECK     ULS results    Karla Bustillos LPN     S:  Pt is a 50 y/o  who presents today to follow up intermittent right sided pelvic pain.  The pain has been generally occurring every other month, always on the right  and around cycle days 12-19.  She is doing well today otherwise with no new concerns.  She is due for pap screening and wondering if she could do that today.     O: /83   Pulse 76   Wt 91.6 kg (202 lb)   LMP 12/19/2024   BMI 28.98 kg/m      Gen-pleasant, NAD    Pelvic Exam:  Vulva: No external lesions, normal hair distribution, no adenopathy  Vagina: normal, no lesions  Cervix: normal, no lesions, pap collected       Gynecological Ultrasound Report  Pelvic U/S - Transabdominal and Transvaginal  [facility]      Referring Provider: Tatum Bartlett MD  Sonographer:  Sol Bronson RDMS  Indication: Intermittent left pelvic pain  LMP: Patient's last menstrual period was 11/24/2024.  Comparison: TVUS 7/2024     Gynecological Ultrasonography:   Uterus: retroverted. Contour is irregular w/ myomata: 1 Left Subserosal LAUREN 4.0 x 4.1 x 3.5 cm.  Size: 7.9 x 5.1 x 4.4 cm  Endometrium: Thickness Total 6.3 mm     Findings:   Right Ovary: 3.7 x 2.2 x 1.4 cm. Wnl  Left Ovary: 2.8 x 2.1 x 2.4 cm. Corpus luteum  Cul de Sac Free Fluid: Mild  Technique: Transvaginal Imaging performed  Transabdominal Imaging performed     Impression: Left LAUREN likely myoma, unchanged from 7/2024.     Val Lind MD MPH    A:  50 y/o  with right sided pelvic pain, suspect related to ovulation.  Known, stable left sided fibroid.      P:  Reviewed  options for progestin only cycle suppression that she could try-POPs, Slynd (likely will have issues getting covered by insurance), daily Agystin up to 5 mg a day.  She will discuss this with her hematologist at her next visit and send a message back if she would like to try anything.   Call/return as needed or for next annual exam.     Tatum Bartlett MD, FACOG      Again, thank you for allowing me to participate in the care of your patient.      Sincerely,    Tatum Bartlett MD

## 2025-01-13 LAB
HPV HR 12 DNA CVX QL NAA+PROBE: NEGATIVE
HPV16 DNA CVX QL NAA+PROBE: NEGATIVE
HPV18 DNA CVX QL NAA+PROBE: NEGATIVE
HUMAN PAPILLOMA VIRUS FINAL DIAGNOSIS: NORMAL

## 2025-01-13 NOTE — PROGRESS NOTES
[facility]  [address] [place] [address], [place] [address]  Main: [phone], Fax: [phone]    [facility] at [address] [place] [address]    Outpatient Visit Note:    Patient: Zayra Altamirano  MRN: [id number]  : 1975  TIMBO: January 15, 2025    Reason for visit:  Annual follow up, history of venous thromboembolism on long term anticoagulation    Clinical History Summary:  Zayra Altamirano is a 49 year old female with past medical history significant for estrogen provoked venous thromboembolism in  on NuvaRing contraceptive. She was subsequently diagnosed with HOMOZYGOUS factor V Leiden mutation. After completion of initial therapeutic anticoagulation period on warfarin she was changed to Xarelto at prophylactic intensity. She is currently on Xarelto 10mg once daily. She has historically followed with Dr. Nagy and then FRANK Hanson.      Thrombosis Events:   - Dec 2005 - NuvaRing x 1 year, then presented with left leg DVT and bilateral PE. Homozygous FVL diagnosis.      She is  and was on Lovenox antepartum and postpartum.        Other notable events include she had double mastectomy with reconstruction in .       Interim History:  Today, Zayra notes that she is doing fair. She notes that she has been dealing with left knee pain this last year and was diagnosed with patellofemoral syndrome after delay in seeking evaluation. She has been participating in physical therapy and has increased her activity level now. She notes when she was less active she had about 15-20lb weight gain. She is working on lifestyle changes. She notes that her blood pressure has been higher at recent clinic visits although she notes anxiety may be contributing. She has been dealing with cyclical every 6 week abdominal pain. 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 It is unclear if this is what is causing  symptoms. She was offered trial of progesterone only pill for ovulation suppression but wanted to discuss with me further. She was also offered vaginal estrogen trial for thinning and dryness.  She notes she is considering evaluation for ADHD as she may be changing careers. She asks about ADHD medication interactions.       ROS:  Denies any bleeding complications. Specifically, no frequent epistaxis. No issues with oral mucosal bleeding. Denies any hematuria or blood in stools. Denies any shortness of breath. No chest pain. No cough. No fever. No leg pain or swelling. NO chest pain or dyspnea.       Medications:   Current Outpatient Medications   Medication Sig Dispense Refill    estradiol (ESTRACE) 0.1 MG/GM vaginal cream Place 2 g vaginally twice a week. 42.5 g 11    loratadine (CLARITIN) 10 MG tablet Take 10 mg by mouth daily as needed for allergies.      rivaroxaban ANTICOAGULANT (XARELTO ANTICOAGULANT) 10 MG TABS tablet Take 1 tablet (10 mg) by mouth daily with food. 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  change goal INR 1.3-2.0    Rosacea         Social  "History:   Social History     Tobacco Use    Smoking status: Never    Smokeless tobacco: Never   Vaping Use    Vaping status: Never Used   Substance Use Topics    Alcohol use: Yes     Alcohol/week: 1.0 standard drink of alcohol     Types: 1 Standard drinks or equivalent per week     Comment: rare occ    Drug use: No       Family History:  Deferred.    Objective:  Vitals: Pulse 88   Temp 97.9  F (36.6  C) (Oral)   Ht 1.782 m (5' 10.16\")   Wt 91.1 kg (200 lb 12.8 oz)   LMP 12/19/2024 (Exact Date)   SpO2 99%   BMI 28.68 kg/m       Exam:   Constitutional: Appears well, no distress  HEENT: Pupils equal and round. No scleral icterus or hemorrhage.   Respiratory: no increased work of breathing.   Mus/Skele: no edema of lower extremities  Skin: no petechiae, no ecchymosis on exposed dermis.  Neuro: CN II-XII intact. Normal gait. AOx3      Labs:  CBC RESULTS:   Recent Labs   Lab Test 09/04/24  1221   WBC 11.3*   RBC 5.03   HGB 15.6   HCT 45.4   MCV 90   MCH 31.0   MCHC 34.4   RDW 12.7        Last Comprehensive Metabolic Panel:  Sodium   Date Value Ref Range Status   01/14/2025 139 135 - 145 mmol/L Final   10/12/2020 137 133 - 144 mmol/L Final     Potassium   Date Value Ref Range Status   01/14/2025 4.2 3.4 - 5.3 mmol/L Final   03/25/2022 4.9 3.4 - 5.3 mmol/L Final   10/12/2020 4.2 3.4 - 5.3 mmol/L Final     Chloride   Date Value Ref Range Status   01/14/2025 102 98 - 107 mmol/L Final   03/25/2022 105 94 - 109 mmol/L Final   10/12/2020 103 94 - 109 mmol/L Final     Carbon Dioxide   Date Value Ref Range Status   10/12/2020 28 20 - 32 mmol/L Final     Carbon Dioxide (CO2)   Date Value Ref Range Status   01/14/2025 27 22 - 29 mmol/L Final   03/25/2022 26 20 - 32 mmol/L Final     Anion Gap   Date Value Ref Range Status   01/14/2025 10 7 - 15 mmol/L Final   03/25/2022 6 3 - 14 mmol/L Final   10/12/2020 6 3 - 14 mmol/L Final     Glucose   Date Value Ref Range Status   01/14/2025 101 (H) 70 - 99 mg/dL Final   03/25/2022 " 112 (H) 70 - 99 mg/dL Final   10/12/2020 101 (H) 70 - 99 mg/dL Final     Comment:     Fasting specimen     GLUCOSE BY METER POCT   Date Value Ref Range Status   08/17/2023 133 (H) 70 - 99 mg/dL Final     Comment:     /RN Notified     Urea Nitrogen   Date Value Ref Range Status   01/14/2025 11.3 6.0 - 20.0 mg/dL Final   03/25/2022 18 7 - 30 mg/dL Final   10/12/2020 13 7 - 30 mg/dL Final     Creatinine   Date Value Ref Range Status   01/14/2025 0.75 0.51 - 0.95 mg/dL Final   10/12/2020 0.84 0.52 - 1.04 mg/dL Final     GFR Estimate   Date Value Ref Range Status   01/14/2025 >90 >60 mL/min/1.73m2 Final     Comment:     eGFR calculated using 2021 CKD-EPI equation.   10/12/2020 84 >60 mL/min/[1.73_m2] Final     Comment:     Non  GFR Calc  Starting 12/18/2018, serum creatinine based estimated GFR (eGFR) will be   calculated using the Chronic Kidney Disease Epidemiology Collaboration   (CKD-EPI) equation.       Calcium   Date Value Ref Range Status   01/14/2025 9.4 8.8 - 10.4 mg/dL Final     Comment:     Reference intervals for this test were updated on 7/16/2024 to reflect our healthy population more accurately. There may be differences in the flagging of prior results with similar values performed with this method. Those prior results can be interpreted in the context of the updated reference intervals.   10/12/2020 9.3 8.5 - 10.1 mg/dL Final     Liver Function Studies -   Recent Labs   Lab Test 01/11/24  1125   PROTTOTAL 7.0   ALBUMIN 4.4   BILITOTAL 0.6   ALKPHOS 53   AST 17   ALT 12        Imaging:  No results found for this or any previous visit (from the past 744 hours).     Assessment:  History of estrogen provoked venous thromboembolism   Homozygous FVL   On long term AC with Xarelto at 20mg once daily  S/P Bilateral Mastectomy and reconstruction, prophylactically due to strong family history of BRCA negative breast cancer     Plan:  Zayra remains an appropriate candidate to stay on  anticoagulation for secondary prophylaxis of venous thromboembolism. She was counseled on the similarities and differences between Eliquis and Xarelto. She elects to stay on Xarelto at 10mg once daily dose. Prescription renewed.      Reasonable for her to trial progesterone only contraceptives to help with menstrual and ovulation suppression.   OK to trial low dose topical estrogen while on anticoagulation. Would advise against oral estrogen unless she goes back on therapeutic intensity anticoagulation.     Instructed her to monitor blood pressure at home in controlled environment.     CBC, CMP annually - reviewed recent results and WNL    Patient instructed to contact the clinic should they require any surgical procedures for perioperative planning.     Return to clinic in 1 year, sooner if any concerns.       Donna Mcginnis, MPH, PA-C  [facility]    30 minutes spent by me on the date of the encounter doing chart review, review of outside records, review of test results, interpretation of tests, patient visit, and documentationThe longitudinal plan of care for the diagnosis(es)/condition(s) as documented were addressed during this visit. Due to the added complexity in care, I will continue to support Zayra in the subsequent management and with ongoing continuity of care.

## 2025-01-14 ENCOUNTER — OFFICE VISIT (OUTPATIENT)
Dept: FAMILY MEDICINE | Facility: CLINIC | Age: 50
End: 2025-01-14
Attending: NURSE PRACTITIONER
Payer: COMMERCIAL

## 2025-01-14 VITALS
HEART RATE: 83 BPM | DIASTOLIC BLOOD PRESSURE: 86 MMHG | RESPIRATION RATE: 18 BRPM | OXYGEN SATURATION: 98 % | TEMPERATURE: 97.8 F | BODY MASS INDEX: 28.63 KG/M2 | WEIGHT: 200 LBS | HEIGHT: 70 IN | SYSTOLIC BLOOD PRESSURE: 134 MMHG

## 2025-01-14 DIAGNOSIS — R10.13 ABDOMINAL PAIN, EPIGASTRIC: ICD-10-CM

## 2025-01-14 DIAGNOSIS — C50.011 MALIGNANT NEOPLASM OF NIPPLE OF RIGHT BREAST IN FEMALE, UNSPECIFIED ESTROGEN RECEPTOR STATUS (H): ICD-10-CM

## 2025-01-14 DIAGNOSIS — Z13.0 SCREENING FOR IRON DEFICIENCY ANEMIA: ICD-10-CM

## 2025-01-14 DIAGNOSIS — N94.6 DYSMENORRHEA: ICD-10-CM

## 2025-01-14 DIAGNOSIS — Z13.1 SCREENING FOR DIABETES MELLITUS: ICD-10-CM

## 2025-01-14 DIAGNOSIS — I74.9 EMBOLISM AND THROMBOSIS OF UNSPECIFIED ARTERY (H): ICD-10-CM

## 2025-01-14 DIAGNOSIS — Z13.29 SCREENING FOR THYROID DISORDER: ICD-10-CM

## 2025-01-14 DIAGNOSIS — Z00.00 ROUTINE GENERAL MEDICAL EXAMINATION AT A HEALTH CARE FACILITY: Primary | ICD-10-CM

## 2025-01-14 DIAGNOSIS — Z13.220 LIPID SCREENING: ICD-10-CM

## 2025-01-14 LAB
ALBUMIN SERPL BCG-MCNC: 4.4 G/DL (ref 3.5–5.2)
ALP SERPL-CCNC: 60 U/L (ref 40–150)
ALT SERPL W P-5'-P-CCNC: 10 U/L (ref 0–50)
ANION GAP SERPL CALCULATED.3IONS-SCNC: 10 MMOL/L (ref 7–15)
AST SERPL W P-5'-P-CCNC: 23 U/L (ref 0–45)
BASOPHILS # BLD AUTO: 0 10E3/UL (ref 0–0.2)
BASOPHILS NFR BLD AUTO: 1 %
BILIRUB SERPL-MCNC: 0.6 MG/DL
BUN SERPL-MCNC: 11.3 MG/DL (ref 6–20)
CALCIUM SERPL-MCNC: 9.4 MG/DL (ref 8.8–10.4)
CHLORIDE SERPL-SCNC: 102 MMOL/L (ref 98–107)
CHOLEST SERPL-MCNC: 211 MG/DL
CREAT SERPL-MCNC: 0.75 MG/DL (ref 0.51–0.95)
EGFRCR SERPLBLD CKD-EPI 2021: >90 ML/MIN/1.73M2
EOSINOPHIL # BLD AUTO: 0.1 10E3/UL (ref 0–0.7)
EOSINOPHIL NFR BLD AUTO: 1 %
ERYTHROCYTE [DISTWIDTH] IN BLOOD BY AUTOMATED COUNT: 12.5 % (ref 10–15)
EST. AVERAGE GLUCOSE BLD GHB EST-MCNC: 105 MG/DL
FASTING STATUS PATIENT QL REPORTED: ABNORMAL
FASTING STATUS PATIENT QL REPORTED: ABNORMAL
GLUCOSE SERPL-MCNC: 101 MG/DL (ref 70–99)
HBA1C MFR BLD: 5.3 % (ref 0–5.6)
HCO3 SERPL-SCNC: 27 MMOL/L (ref 22–29)
HCT VFR BLD AUTO: 42.2 % (ref 35–47)
HDLC SERPL-MCNC: 60 MG/DL
HGB BLD-MCNC: 14.1 G/DL (ref 11.7–15.7)
IMM GRANULOCYTES # BLD: 0 10E3/UL
IMM GRANULOCYTES NFR BLD: 0 %
LDLC SERPL CALC-MCNC: 138 MG/DL
LYMPHOCYTES # BLD AUTO: 1.1 10E3/UL (ref 0.8–5.3)
LYMPHOCYTES NFR BLD AUTO: 17 %
MCH RBC QN AUTO: 29.7 PG (ref 26.5–33)
MCHC RBC AUTO-ENTMCNC: 33.4 G/DL (ref 31.5–36.5)
MCV RBC AUTO: 89 FL (ref 78–100)
MONOCYTES # BLD AUTO: 0.3 10E3/UL (ref 0–1.3)
MONOCYTES NFR BLD AUTO: 6 %
NEUTROPHILS # BLD AUTO: 4.7 10E3/UL (ref 1.6–8.3)
NEUTROPHILS NFR BLD AUTO: 76 %
NONHDLC SERPL-MCNC: 151 MG/DL
PLATELET # BLD AUTO: 299 10E3/UL (ref 150–450)
POTASSIUM SERPL-SCNC: 4.2 MMOL/L (ref 3.4–5.3)
PROT SERPL-MCNC: 7.4 G/DL (ref 6.4–8.3)
RBC # BLD AUTO: 4.75 10E6/UL (ref 3.8–5.2)
SODIUM SERPL-SCNC: 139 MMOL/L (ref 135–145)
TRIGL SERPL-MCNC: 65 MG/DL
TSH SERPL DL<=0.005 MIU/L-ACNC: 2.01 UIU/ML (ref 0.3–4.2)
WBC # BLD AUTO: 6.2 10E3/UL (ref 4–11)

## 2025-01-14 PROCEDURE — 80061 LIPID PANEL: CPT | Performed by: NURSE PRACTITIONER

## 2025-01-14 PROCEDURE — 99396 PREV VISIT EST AGE 40-64: CPT | Performed by: NURSE PRACTITIONER

## 2025-01-14 PROCEDURE — 80053 COMPREHEN METABOLIC PANEL: CPT | Performed by: NURSE PRACTITIONER

## 2025-01-14 PROCEDURE — 83036 HEMOGLOBIN GLYCOSYLATED A1C: CPT | Performed by: NURSE PRACTITIONER

## 2025-01-14 PROCEDURE — 85025 COMPLETE CBC W/AUTO DIFF WBC: CPT | Performed by: NURSE PRACTITIONER

## 2025-01-14 PROCEDURE — G2211 COMPLEX E/M VISIT ADD ON: HCPCS | Performed by: NURSE PRACTITIONER

## 2025-01-14 PROCEDURE — 99214 OFFICE O/P EST MOD 30 MIN: CPT | Mod: 25 | Performed by: NURSE PRACTITIONER

## 2025-01-14 PROCEDURE — 36415 COLL VENOUS BLD VENIPUNCTURE: CPT | Performed by: NURSE PRACTITIONER

## 2025-01-14 PROCEDURE — 84443 ASSAY THYROID STIM HORMONE: CPT | Performed by: NURSE PRACTITIONER

## 2025-01-14 SDOH — HEALTH STABILITY: PHYSICAL HEALTH: ON AVERAGE, HOW MANY MINUTES DO YOU ENGAGE IN EXERCISE AT THIS LEVEL?: 20 MIN

## 2025-01-14 SDOH — HEALTH STABILITY: PHYSICAL HEALTH: ON AVERAGE, HOW MANY DAYS PER WEEK DO YOU ENGAGE IN MODERATE TO STRENUOUS EXERCISE (LIKE A BRISK WALK)?: 5 DAYS

## 2025-01-14 ASSESSMENT — SOCIAL DETERMINANTS OF HEALTH (SDOH): HOW OFTEN DO YOU GET TOGETHER WITH FRIENDS OR RELATIVES?: THREE TIMES A WEEK

## 2025-01-14 ASSESSMENT — PAIN SCALES - GENERAL: PAINLEVEL_OUTOF10: NO PAIN (0)

## 2025-01-14 NOTE — PATIENT INSTRUCTIONS
Patient Education   Preventive Care Advice   This is general advice given by our system to help you stay healthy. However, your care team may have specific advice just for you. Please talk to your care team about your preventive care needs.  Nutrition  Eat 5 or more servings of fruits and vegetables each day.  Try wheat bread, brown rice and whole grain pasta (instead of white bread, rice, and pasta).  Get enough calcium and vitamin D. Check the label on foods and aim for 100% of the RDA (recommended daily allowance).  Lifestyle  Exercise at least 150 minutes each week  (30 minutes a day, 5 days a week).  Do muscle strengthening activities 2 days a week. These help control your weight and prevent disease.  No smoking.  Wear sunscreen to prevent skin cancer.  Have a dental exam and cleaning every 6 months.  Yearly exams  See your health care team every year to talk about:  Any changes in your health.  Any medicines your care team has prescribed.  Preventive care, family planning, and ways to prevent chronic diseases.  Shots (vaccines)   HPV shots (up to age 26), if you've never had them before.  Hepatitis B shots (up to age 59), if you've never had them before.  COVID-19 shot: Get this shot when it's due.  Flu shot: Get a flu shot every year.  Tetanus shot: Get a tetanus shot every 10 years.  Pneumococcal, hepatitis A, and RSV shots: Ask your care team if you need these based on your risk.  Shingles shot (for age 50 and up)  General health tests  Diabetes screening:  Starting at age 35, Get screened for diabetes at least every 3 years.  If you are younger than age 35, ask your care team if you should be screened for diabetes.  Cholesterol test: At age 39, start having a cholesterol test every 5 years, or more often if advised.  Bone density scan (DEXA): At age 50, ask your care team if you should have this scan for osteoporosis (brittle bones).  Hepatitis C: Get tested at least once in your life.  STIs (sexually  transmitted infections)  Before age 24: Ask your care team if you should be screened for STIs.  After age 24: Get screened for STIs if you're at risk. You are at risk for STIs (including HIV) if:  You are sexually active with more than one person.  You don't use condoms every time.  You or a partner was diagnosed with a sexually transmitted infection.  If you are at risk for HIV, ask about PrEP medicine to prevent HIV.  Get tested for HIV at least once in your life, whether you are at risk for HIV or not.  Cancer screening tests  Cervical cancer screening: If you have a cervix, begin getting regular cervical cancer screening tests starting at age 21.  Breast cancer scan (mammogram): If you've ever had breasts, begin having regular mammograms starting at age 40. This is a scan to check for breast cancer.  Colon cancer screening: It is important to start screening for colon cancer at age 45.  Have a colonoscopy test every 10 years (or more often if you're at risk) Or, ask your provider about stool tests like a FIT test every year or Cologuard test every 3 years.  To learn more about your testing options, visit:   .  For help making a decision, visit:   https://bit.ly/od23700.  Prostate cancer screening test: If you have a prostate, ask your care team if a prostate cancer screening test (PSA) at age 55 is right for you.  Lung cancer screening: If you are a current or former smoker ages 50 to 80, ask your care team if ongoing lung cancer screenings are right for you.  For informational purposes only. Not to replace the advice of your health care provider. Copyright   2023 [employer]. All rights reserved. Clinically reviewed by the [facility] Transitions Program. [employer] [id number] - REV 01/24.

## 2025-01-14 NOTE — PROGRESS NOTES
S:  Pt is a 48 y/o  who presents today to follow up intermittent right sided pelvic pain.  The pain has been generally occurring every other month, always on the right  and around cycle days 12-19.  She is doing well today otherwise with no new concerns.  She is due for pap screening and wondering if she could do that today.     O: /83   Pulse 76   Wt 91.6 kg (202 lb)   LMP 12/19/2024   BMI 28.98 kg/m      Gen-pleasant, NAD    Pelvic Exam:  Vulva: No external lesions, normal hair distribution, no adenopathy  Vagina: normal, no lesions  Cervix: normal, no lesions, pap collected       Gynecological Ultrasound Report  Pelvic U/S - Transabdominal and Transvaginal  [facility]      Referring Provider: Tatum Bartlett MD  Sonographer:  Sol Bronson RDMS  Indication: Intermittent left pelvic pain  LMP: Patient's last menstrual period was 11/24/2024.  Comparison: TVUS 7/2024     Gynecological Ultrasonography:   Uterus: retroverted. Contour is irregular w/ myomata: 1 Left Subserosal LAUREN 4.0 x 4.1 x 3.5 cm.  Size: 7.9 x 5.1 x 4.4 cm  Endometrium: Thickness Total 6.3 mm     Findings:   Right Ovary: 3.7 x 2.2 x 1.4 cm. Wnl  Left Ovary: 2.8 x 2.1 x 2.4 cm. Corpus luteum  Cul de Sac Free Fluid: Mild  Technique: Transvaginal Imaging performed  Transabdominal Imaging performed     Impression: Left LAUREN likely myoma, unchanged from 7/2024.     Val Lind MD MPH    A:  48 y/o  with right sided pelvic pain, suspect related to ovulation.  Known, stable left sided fibroid.      P:  Reviewed options for progestin only cycle suppression that she could try-POPs, Slynd (likely will have issues getting covered by insurance), daily Agystin up to 5 mg a day.  She will discuss this with her hematologist at her next visit and send a message back if she would like to try anything.   Call/return as needed or for next annual exam.     Tatum Bartlett MD, FACOG

## 2025-01-14 NOTE — PROGRESS NOTES
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  Follows with hematology, on xarelto.    Malignant neoplasm of nipple of right breast in female, unspecified estrogen receptor status (H)  S/p bilateral mastectomy.    Dysmenorrhea  Recently saw OBGYN who recommended mini-pill.  Would be in agreement with this plan.  Not a candidate for estrogen given history.  She plans to further discuss with her hematologist.  Could also consider selective serotonin reuptake inhibitor.    Abdominal pain, epigastric  Started this morning, exam is reassuring and we are obtaining routine labs today.  Continue to monitor.      Screening for iron deficiency anemia  - CBC with platelets and differential; Future  - CBC with platelets and differential    Screening for thyroid disorder  - TSH with free T4 reflex; Future  - TSH with free T4 reflex    Screening for diabetes mellitus  - Hemoglobin A1c; Future  - Comprehensive metabolic panel (BMP + Alb, Alk Phos, ALT, AST, Total. Bili, TP); Future  - Hemoglobin A1c  - Comprehensive metabolic panel (BMP + Alb, Alk Phos, ALT, AST, Total. Bili, TP)    Lipid screening  - Lipid panel reflex to direct LDL Fasting; Future  - Lipid panel reflex to direct LDL Fasting    Patient has been advised of split billing requirements and indicates understanding: Yes    The longitudinal plan of care for the diagnosis(es)/condition(s) as documented were addressed  during this visit. Due to the added complexity in care, I will continue to support Zayra in the subsequent management and with ongoing continuity of care.    Counseling  Appropriate preventive services were addressed with this patient via screening, questionnaire, or discussion as appropriate for fall prevention, nutrition, physical activity, Tobacco-use cessation, social engagement, weight loss and cognition.  Checklist reviewing preventive services available has been given to the patient.  Reviewed patient's diet, addressing concerns and/or questions.           Subjective   Zayra is a 49 year old, presenting for the following:  Physical (HRT)        1/14/2025    10:39 AM   Additional Questions   Roomed by Amanda RYAN          HPI  Has been doing PT for left knee.    Intermediate ovarian pain, still having regular menses. Feels that pain is occurring mostly in luteal phase. Discussed mini pill with OB. Wants to discuss this.     Meeting with hem/onc tomorrow for Factor 5. Continues on xarelto.     Abdominal pain this morning, feels different than ovarian pain, worried about a rupture with being on xarelto. Had a panic attack this morning due to worry about.      Health Care Directive  Patient does not have a Health Care Directive: Discussed advance care planning with patient; information given to patient to review.      1/14/2025   General Health   How would you rate your overall physical health? Good   Feel stress (tense, anxious, or unable to sleep) Not at all         1/14/2025   Nutrition   Three or more servings of calcium each day? Yes   Diet: Regular (no restrictions)    Breakfast skipped   How many servings of fruit and vegetables per day? (!) 2-3   How many sweetened beverages each day? 0-1       Multiple values from one day are sorted in reverse-chronological order         1/14/2025   Exercise   Days per week of moderate/strenous exercise 5 days   Average minutes spent exercising at this level 20 min          1/14/2025   Social Factors   Frequency of gathering with friends or relatives Three times a week   Worry food won't last until get money to buy more No    No   Food not last or not have enough money for food? No    No   Do you have housing? (Housing is defined as stable permanent housing and does not include staying ouside in a car, in a tent, in an abandoned building, in an overnight shelter, or couch-surfing.) Yes    Yes   Are you worried about losing your housing? No    No   Lack of transportation? No    No   Unable to get utilities (heat,electricity)? No    No       Multiple values from one day are sorted in reverse-chronological order         1/14/2025   Fall Risk   Fallen 2 or more times in the past year? No   Trouble with walking or balance? No          1/14/2025   Dental   Dentist two times every year? Yes         1/14/2025   TB Screening   Were you born outside of the [place]? No         Today's PHQ-2 Score:       1/14/2025    10:31 AM   PHQ-2 ( 1999 [employer])   Q1: Little interest or pleasure in doing things 0   Q2: Feeling down, depressed or hopeless 0   PHQ-2 Score 0    Q1: Little interest or pleasure in doing things Not at all   Q2: Feeling down, depressed or hopeless Not at all   PHQ-2 Score 0       Patient-reported           1/14/2025   Substance Use   Alcohol more than 3/day or more than 7/wk No   Do you use any other substances recreationally? No     Social History     Tobacco Use    Smoking status: Never    Smokeless tobacco: Never   Vaping Use    Vaping status: Never Used   Substance Use Topics    Alcohol use: Yes     Alcohol/week: 1.0 standard drink of alcohol     Types: 1 Standard drinks or equivalent per week     Comment: rare occ    Drug use: No           9/19/2022   LAST FHS-7 RESULTS   1st degree relative breast or ovarian cancer Yes   Any relative bilateral breast cancer No   Any male have breast cancer No   Any ONE woman have BOTH breast AND ovarian cancer No   Any woman with breast cancer before  "50yrs No   2 or more relatives with breast AND/OR ovarian cancer Yes   2 or more relatives with breast AND/OR bowel cancer Yes        No longer indicated due to mastectomies.        1/14/2025   STI Screening   New sexual partner(s) since last STI/HIV test? No     History of abnormal Pap smear: No - age 30- 64 PAP with HPV every 5 years recommended        Latest Ref Rng & Units 1/9/2025     3:58 PM 10/12/2020     8:22 AM 4/19/2017    11:14 AM   PAP / HPV   PAP (Historical)   NIL  NIL    HPV 16 DNA Negative Negative  Negative     HPV 18 DNA Negative Negative  Negative     Other HR HPV Negative Negative  Negative       ASCVD Risk   The 10-year ASCVD risk score (Margarita LABOY, et al., 2019) is: 1.6%    Values used to calculate the score:      Age: 49 years      Sex: Female      Is Non- : No      Diabetic: No      Tobacco smoker: No      Systolic Blood Pressure: 134 mmHg      Is BP treated: No      HDL Cholesterol: 52 mg/dL      Total Cholesterol: 210 mg/dL            1/14/2025   Contraception/Family Planning   Questions about contraception or family planning No        Reviewed and updated as needed this visit by Provider                             Objective    Exam  /86   Pulse 83   Temp 97.8  F (36.6  C) (Temporal)   Resp 18   Ht 1.782 m (5' 10.16\")   Wt 90.7 kg (200 lb)   LMP 12/19/2024 (Exact Date)   SpO2 98%   BMI 28.57 kg/m     Estimated body mass index is 28.57 kg/m  as calculated from the following:    Height as of this encounter: 1.782 m (5' 10.16\").    Weight as of this encounter: 90.7 kg (200 lb).    Physical Exam  GENERAL: alert and no distress  EYES: Eyes grossly normal to inspection, PERRL and conjunctivae and sclerae normal  HENT: ear canals and TM's normal, nose and mouth without ulcers or lesions  NECK: no adenopathy, no asymmetry, masses, or scars  RESP: lungs clear to auscultation - no rales, rhonchi or wheezes  BREAST: s/p mastectomy bilateral with " reconstruction.  CV: regular rate and rhythm, normal S1 S2, no S3 or S4, no murmur, click or rub, no peripheral edema  ABDOMEN: soft, nontender, no hepatosplenomegaly, no masses and bowel sounds normal  MS: no gross musculoskeletal defects noted, no edema  SKIN: no suspicious lesions or rashes  NEURO: Normal strength and tone, mentation intact and speech normal  PSYCH: mentation appears normal, affect normal/bright        Signed Electronically by: Allie Cotter DNP

## 2025-01-15 ENCOUNTER — OFFICE VISIT (OUTPATIENT)
Dept: HEMATOLOGY | Facility: CLINIC | Age: 50
End: 2025-01-15
Attending: PHYSICIAN ASSISTANT
Payer: COMMERCIAL

## 2025-01-15 VITALS
WEIGHT: 200.8 LBS | TEMPERATURE: 97.9 F | BODY MASS INDEX: 28.75 KG/M2 | HEART RATE: 88 BPM | HEIGHT: 70 IN | OXYGEN SATURATION: 99 %

## 2025-01-15 DIAGNOSIS — D68.51 HOMOZYGOUS FACTOR V LEIDEN MUTATION: Primary | ICD-10-CM

## 2025-01-15 DIAGNOSIS — Z86.718 HISTORY OF VENOUS THROMBOEMBOLISM: ICD-10-CM

## 2025-01-15 DIAGNOSIS — Z79.01 CHRONIC ANTICOAGULATION: ICD-10-CM

## 2025-01-15 DIAGNOSIS — Z71.89 ENCOUNTER FOR ANTICOAGULATION DISCUSSION AND COUNSELING: ICD-10-CM

## 2025-01-15 LAB
BKR AP ASSOCIATED HPV REPORT: NORMAL
BKR LAB AP GYN ADEQUACY: NORMAL
BKR LAB AP GYN INTERPRETATION: NORMAL
BKR LAB AP LMP: NORMAL
BKR LAB AP PREVIOUS ABNORMAL: NORMAL
PATH REPORT.COMMENTS IMP SPEC: NORMAL
PATH REPORT.COMMENTS IMP SPEC: NORMAL
PATH REPORT.RELEVANT HX SPEC: NORMAL

## 2025-01-15 PROCEDURE — 99213 OFFICE O/P EST LOW 20 MIN: CPT | Performed by: PHYSICIAN ASSISTANT

## 2025-01-15 NOTE — PATIENT INSTRUCTIONS
[facility]  [facility]  [facility], [address], [place] [address]  Main: [phone], Fax: [phone]    Zayra,   It was a pleasure seeing you today.  Thank you for allowing us to be involved in your care.  Please let us know if there is anything else we can do for you, so that we can be sure you are leaving completely satisfied with your care experience. Below is the plan that we discussed.     Stay on Xarelto 10mg once daily. Consider copay card if cost higher than $10/mo   OK to trial progesterone only pill for menstrual suppression and ovulation suppression to see if that helps with symptoms.   If needed, may consider vaginal estrogen as needed. If you did require oral estrogen, we should increase your Xarelto dose to therapeutic.   Call if you have any surgeries planned or if you have any other questions or concerns.       We would like a provider on our team to see you at least annually for optimal care and to allow us to continue to prescribe for you.        Return to clinic in  in one year.    If you have questions or concerns, please don't hesitate to send a [employer] Message for non urgent matters or contact my nurse clinician, Nancy. Her number is [phone]. If they are unavailable and you have immediate concerns, please call [phone] and ask for a nurse.     Donna Mcginnis, MPH, PA-C  [facility]

## 2025-08-11 ENCOUNTER — OFFICE VISIT (OUTPATIENT)
Dept: FAMILY MEDICINE | Facility: CLINIC | Age: 50
End: 2025-08-11
Payer: COMMERCIAL

## 2025-08-11 VITALS
WEIGHT: 203.9 LBS | HEART RATE: 84 BPM | TEMPERATURE: 98.8 F | SYSTOLIC BLOOD PRESSURE: 139 MMHG | BODY MASS INDEX: 28.55 KG/M2 | OXYGEN SATURATION: 98 % | DIASTOLIC BLOOD PRESSURE: 78 MMHG | HEIGHT: 71 IN | RESPIRATION RATE: 16 BRPM

## 2025-08-11 DIAGNOSIS — N95.1 PERIMENOPAUSE: ICD-10-CM

## 2025-08-11 DIAGNOSIS — G44.82 PRE-ORGASMIC HEADACHE: ICD-10-CM

## 2025-08-11 DIAGNOSIS — R41.840 CONCENTRATION DEFICIT: ICD-10-CM

## 2025-08-11 DIAGNOSIS — H91.93 DECREASED HEARING OF BOTH EARS: ICD-10-CM

## 2025-08-11 DIAGNOSIS — R53.82 CHRONIC FATIGUE: Primary | ICD-10-CM

## 2025-08-11 LAB
ERYTHROCYTE [DISTWIDTH] IN BLOOD BY AUTOMATED COUNT: 12.7 % (ref 10–15)
FERRITIN SERPL-MCNC: 33 NG/ML (ref 11–328)
HCT VFR BLD AUTO: 44.1 % (ref 35–47)
HGB BLD-MCNC: 14.7 G/DL (ref 11.7–15.7)
IRON BINDING CAPACITY (ROCHE): 311 UG/DL (ref 240–430)
IRON SATN MFR SERPL: 31 % (ref 15–46)
IRON SERPL-MCNC: 96 UG/DL (ref 37–145)
MCH RBC QN AUTO: 30 PG (ref 26.5–33)
MCHC RBC AUTO-ENTMCNC: 33.3 G/DL (ref 31.5–36.5)
MCV RBC AUTO: 90 FL (ref 78–100)
PLATELET # BLD AUTO: 311 10E3/UL (ref 150–450)
RBC # BLD AUTO: 4.9 10E6/UL (ref 3.8–5.2)
TSH SERPL DL<=0.005 MIU/L-ACNC: 3.06 UIU/ML (ref 0.3–4.2)
VIT D+METAB SERPL-MCNC: 75 NG/ML (ref 20–50)
WBC # BLD AUTO: 6.4 10E3/UL (ref 4–11)

## 2025-08-11 PROCEDURE — 83550 IRON BINDING TEST: CPT | Performed by: NURSE PRACTITIONER

## 2025-08-11 PROCEDURE — 84443 ASSAY THYROID STIM HORMONE: CPT | Performed by: NURSE PRACTITIONER

## 2025-08-11 PROCEDURE — 82728 ASSAY OF FERRITIN: CPT | Performed by: NURSE PRACTITIONER

## 2025-08-11 PROCEDURE — 3078F DIAST BP <80 MM HG: CPT | Performed by: NURSE PRACTITIONER

## 2025-08-11 PROCEDURE — G2211 COMPLEX E/M VISIT ADD ON: HCPCS | Performed by: NURSE PRACTITIONER

## 2025-08-11 PROCEDURE — 99214 OFFICE O/P EST MOD 30 MIN: CPT | Performed by: NURSE PRACTITIONER

## 2025-08-11 PROCEDURE — 85027 COMPLETE CBC AUTOMATED: CPT | Performed by: NURSE PRACTITIONER

## 2025-08-11 PROCEDURE — 36415 COLL VENOUS BLD VENIPUNCTURE: CPT | Performed by: NURSE PRACTITIONER

## 2025-08-11 PROCEDURE — 82306 VITAMIN D 25 HYDROXY: CPT | Performed by: NURSE PRACTITIONER

## 2025-08-11 PROCEDURE — 3075F SYST BP GE 130 - 139MM HG: CPT | Performed by: NURSE PRACTITIONER

## 2025-08-11 PROCEDURE — 1126F AMNT PAIN NOTED NONE PRSNT: CPT | Performed by: NURSE PRACTITIONER

## 2025-08-11 PROCEDURE — 83540 ASSAY OF IRON: CPT | Performed by: NURSE PRACTITIONER

## 2025-08-11 ASSESSMENT — PAIN SCALES - GENERAL: PAINLEVEL_OUTOF10: NO PAIN (0)

## 2025-08-12 ENCOUNTER — PATIENT OUTREACH (OUTPATIENT)
Dept: CARE COORDINATION | Facility: CLINIC | Age: 50
End: 2025-08-12
Payer: COMMERCIAL

## 2025-08-14 ENCOUNTER — PATIENT OUTREACH (OUTPATIENT)
Dept: CARE COORDINATION | Facility: CLINIC | Age: 50
End: 2025-08-14
Payer: COMMERCIAL

## (undated) DEVICE — DRSG GAUZE 4X4" 3033

## (undated) DEVICE — SUCTION MANIFOLD NEPTUNE 2 SYS 1 PORT 702-025-000

## (undated) DEVICE — DRSG KERLIX 4 1/2"X4YDS ROLL 6715

## (undated) DEVICE — GLOVE BIOGEL PI MICRO INDICATOR UNDERGLOVE SZ 6.5 48965

## (undated) DEVICE — KIT ENDO FIRST STEP DISINFECTANT 200ML W/POUCH EP-4

## (undated) DEVICE — BLADE KNIFE SURG 10 371110

## (undated) DEVICE — ESU ELEC BLADE 2.75" COATED/INSULATED E1455

## (undated) DEVICE — ENDO FORCEP SPIKED SERRATED SHAFT JUMBO 239CM G56998

## (undated) DEVICE — TUBING SUCTION 12"X1/4" N612

## (undated) DEVICE — SNARE CAPIVATOR ROUND COLD SNR BX10 M00561101

## (undated) DEVICE — STPL REMOVER SKIN DISP 150462

## (undated) DEVICE — NDL 19GA 1.5"

## (undated) DEVICE — NDL SCLEROTHERAPY 25GA CARR-LOCK  00711811

## (undated) DEVICE — SYR EAR BULB 3OZ 0035830

## (undated) DEVICE — DRAPE SHEET REV FOLD 3/4 9349

## (undated) DEVICE — PAD CHUX UNDERPAD 23X24" 7136

## (undated) DEVICE — GLOVE BIOGEL PI MICRO SZ 6.0 48560

## (undated) DEVICE — DRAIN JACKSON PRATT 15FR ROUND SU130-1323

## (undated) DEVICE — SU VICRYL 2-0 CT-1 27" J339H

## (undated) DEVICE — SU MONOCRYL 4-0 PS-2 18" UND Y496G

## (undated) DEVICE — ESU PENCIL W/SMOKE EVAC NEPTUNE STRYKER 0703-046-000

## (undated) DEVICE — BNDG ELASTIC 6" DBL LENGTH UNSTERILE 6611-16

## (undated) DEVICE — KIT ENDO TURNOVER/PROCEDURE CARRY-ON 101822

## (undated) DEVICE — DRSG STERI STRIP 1/2X4" R1547

## (undated) DEVICE — SOL WATER IRRIG 500ML BOTTLE 2F7113

## (undated) DEVICE — LINEN TOWEL PACK X5 5464

## (undated) DEVICE — SPONGE LAP 18X18" X8435

## (undated) DEVICE — SU VICRYL 3-0 PS-1 18" UND J683

## (undated) DEVICE — BLADE KNIFE SURG 15 371115

## (undated) DEVICE — SYR 10ML FINGER CONTROL W/O NDL 309695

## (undated) DEVICE — NDL 22GA 1.5"

## (undated) DEVICE — ENDO TRAP POLYP E-TRAP 00711099

## (undated) DEVICE — SU VICRYL 3-0 SH 27" J316H

## (undated) DEVICE — DRAPE BREAST/CHEST 29420

## (undated) DEVICE — PACK MINOR SBA15MIFSE

## (undated) DEVICE — SU VICRYL 4-0 PS-2 18" UND J496H

## (undated) DEVICE — ASSURANCE CLIP

## (undated) DEVICE — SU VICRYL 3-0 TIE 12X18" J904T

## (undated) DEVICE — PREP CHLORAPREP 26ML TINTED ORANGE  260815

## (undated) DEVICE — SPECIMEN CONTAINER 3OZ W/FORMALIN 59901

## (undated) DEVICE — SU SILK 2-0 FSL 18" 677G

## (undated) DEVICE — NDL SPINAL 22GA 3.5" QUINCKE 405181

## (undated) DEVICE — GOWN IMPERVIOUS 2XL BLUE

## (undated) DEVICE — SYR ORISE GEL 10ML M00519201

## (undated) DEVICE — ENDO SNARE EXACTO COLD 9MM LOOP 2.4MMX230CM 00711115

## (undated) DEVICE — SOL WATER IRRIG 1000ML BOTTLE 2F7114

## (undated) DEVICE — DRAIN JACKSON PRATT RESERVOIR 100ML SU130-1305

## (undated) DEVICE — DRAPE IOBAN INCISE 23X17" 6650EZ

## (undated) DEVICE — SOL NACL 0.9% IRRIG 1000ML BOTTLE 2F7124

## (undated) DEVICE — SLEEVE PROTECTIVE BREAST BIOPSY  GMSLV001-10

## (undated) RX ORDER — APREPITANT 40 MG/1
CAPSULE ORAL
Status: DISPENSED
Start: 2023-08-16

## (undated) RX ORDER — FENTANYL CITRATE 50 UG/ML
INJECTION, SOLUTION INTRAMUSCULAR; INTRAVENOUS
Status: DISPENSED
Start: 2023-08-16

## (undated) RX ORDER — PROPOFOL 10 MG/ML
INJECTION, EMULSION INTRAVENOUS
Status: DISPENSED
Start: 2023-08-16

## (undated) RX ORDER — FENTANYL CITRATE 0.05 MG/ML
INJECTION, SOLUTION INTRAMUSCULAR; INTRAVENOUS
Status: DISPENSED
Start: 2023-08-16

## (undated) RX ORDER — BUPIVACAINE HYDROCHLORIDE AND EPINEPHRINE 5; 5 MG/ML; UG/ML
INJECTION, SOLUTION EPIDURAL; INTRACAUDAL; PERINEURAL
Status: DISPENSED
Start: 2023-08-16

## (undated) RX ORDER — HYDROMORPHONE HCL IN WATER/PF 6 MG/30 ML
PATIENT CONTROLLED ANALGESIA SYRINGE INTRAVENOUS
Status: DISPENSED
Start: 2023-08-16

## (undated) RX ORDER — ONDANSETRON 2 MG/ML
INJECTION INTRAMUSCULAR; INTRAVENOUS
Status: DISPENSED
Start: 2023-08-16

## (undated) RX ORDER — CEFAZOLIN SODIUM 1 G/3ML
INJECTION, POWDER, FOR SOLUTION INTRAMUSCULAR; INTRAVENOUS
Status: DISPENSED
Start: 2023-08-16